# Patient Record
Sex: FEMALE | Race: WHITE | NOT HISPANIC OR LATINO | Employment: UNEMPLOYED | URBAN - METROPOLITAN AREA
[De-identification: names, ages, dates, MRNs, and addresses within clinical notes are randomized per-mention and may not be internally consistent; named-entity substitution may affect disease eponyms.]

---

## 2023-05-31 LAB
ALANINE AMINOTRANSFERASE (SGPT) (U/L) IN SER/PLAS: 24 U/L (ref 7–45)
ALBUMIN (G/DL) IN SER/PLAS: 3.9 G/DL (ref 3.4–5)
ALKALINE PHOSPHATASE (U/L) IN SER/PLAS: 99 U/L (ref 33–136)
ANION GAP IN SER/PLAS: 12 MMOL/L (ref 10–20)
ASPARTATE AMINOTRANSFERASE (SGOT) (U/L) IN SER/PLAS: 30 U/L (ref 9–39)
BILIRUBIN TOTAL (MG/DL) IN SER/PLAS: 0.8 MG/DL (ref 0–1.2)
CALCIUM (MG/DL) IN SER/PLAS: 9.5 MG/DL (ref 8.6–10.3)
CARBON DIOXIDE, TOTAL (MMOL/L) IN SER/PLAS: 30 MMOL/L (ref 21–32)
CHLORIDE (MMOL/L) IN SER/PLAS: 99 MMOL/L (ref 98–107)
CREATININE (MG/DL) IN SER/PLAS: 0.71 MG/DL (ref 0.5–1.05)
GFR FEMALE: >90 ML/MIN/1.73M2
GLUCOSE (MG/DL) IN SER/PLAS: 96 MG/DL (ref 74–99)
POTASSIUM (MMOL/L) IN SER/PLAS: 4.4 MMOL/L (ref 3.5–5.3)
PROTEIN TOTAL: 7.4 G/DL (ref 6.4–8.2)
SODIUM (MMOL/L) IN SER/PLAS: 137 MMOL/L (ref 136–145)
THYROTROPIN (MIU/L) IN SER/PLAS BY DETECTION LIMIT <= 0.05 MIU/L: 3.25 MIU/L (ref 0.44–3.98)
THYROXINE (T4) FREE (NG/DL) IN SER/PLAS: 1.28 NG/DL (ref 0.61–1.12)
TRIIODOTHYRONINE (T3) FREE (PG/ML) IN SER/PLAS: 2.8 PG/ML (ref 2.3–4.2)
UREA NITROGEN (MG/DL) IN SER/PLAS: 16 MG/DL (ref 6–23)

## 2023-06-24 DIAGNOSIS — G89.18 POSTOPERATIVE PAIN: Primary | ICD-10-CM

## 2023-06-24 RX ORDER — OXYCODONE HYDROCHLORIDE 15 MG/1
15 TABLET ORAL EVERY 6 HOURS PRN
Qty: 60 TABLET | Refills: 0 | Status: SHIPPED | OUTPATIENT
Start: 2023-06-24 | End: 2023-07-09

## 2023-07-02 ENCOUNTER — NURSING HOME VISIT (OUTPATIENT)
Dept: POST ACUTE CARE | Facility: EXTERNAL LOCATION | Age: 63
End: 2023-07-02
Payer: MEDICARE

## 2023-07-02 VITALS — DIASTOLIC BLOOD PRESSURE: 67 MMHG | SYSTOLIC BLOOD PRESSURE: 102 MMHG | HEART RATE: 77 BPM

## 2023-07-02 DIAGNOSIS — I73.9 PAD (PERIPHERAL ARTERY DISEASE) (CMS-HCC): Primary | ICD-10-CM

## 2023-07-02 DIAGNOSIS — I25.119 CHEST PAIN DUE TO CAD (CMS-HCC): ICD-10-CM

## 2023-07-02 DIAGNOSIS — Z95.828 S/P VASCULAR BYPASS: ICD-10-CM

## 2023-07-02 DIAGNOSIS — I87.1 SUBCLAVIAN VEIN STENOSIS: ICD-10-CM

## 2023-07-02 DIAGNOSIS — G90.512 COMPLEX REGIONAL PAIN SYNDROME TYPE 1 OF LEFT UPPER EXTREMITY: ICD-10-CM

## 2023-07-02 PROCEDURE — 99306 1ST NF CARE HIGH MDM 50: CPT | Performed by: INTERNAL MEDICINE

## 2023-07-02 ASSESSMENT — ENCOUNTER SYMPTOMS
WEAKNESS: 1
CHEST TIGHTNESS: 0
ACTIVITY CHANGE: 1
COUGH: 0
TREMORS: 0
DIFFICULTY URINATING: 0
GASTROINTESTINAL NEGATIVE: 1
ABDOMINAL DISTENTION: 0
ARTHRALGIAS: 1
WOUND: 1
DYSPHORIC MOOD: 0
FATIGUE: 1
SLEEP DISTURBANCE: 0
VOMITING: 0
SHORTNESS OF BREATH: 1
ABDOMINAL PAIN: 0
NERVOUS/ANXIOUS: 0
APPETITE CHANGE: 0
EYES NEGATIVE: 1
BACK PAIN: 0
WHEEZING: 0

## 2023-07-02 NOTE — LETTER
Patient: Daphney Chen  : 1960    Encounter Date: 2023    Subjective  Patient ID: Daphney Chen is a 63 y.o. female who is acute skilled care and presents for initial visit for skilled nursing.    63-year-old female patient has been admitted after being found to have a subclavian stenosis and aortic disease, patient was having vascular rest pain, after back-and-forth her evaluation they decided to do axillary femoral bypass surgery, surgery was done at [facility], it was axillary bifemoral bypass surgery, patient was admitted for the first time and overnight patient developed chest pain, patient was transferred back to the hospital, slightly hypotensive so everyone jumped into hypotension give vasopressors was admitted to ICU nothing was quite obvious hemoglobin was 10.8 CT scan of chest showed some vascular congestion, echocardiography was done, chest pain subsided, BP readings improved.  After having back-and-forth hospitalization patient is back here in the facility for acute skilled nursing and rehabilitation.  Before revealed occlusive disease in the distal abdominal aorta and bilateral iliac arteries.  Disease was substantial for the inflow of the blood into the lower extremity that axillary bifemoral bypass conduit was placed.  Patient is tired and fatigued, overnight has been uneventful.  Somehow patient has been given steroids, patient has history of compartment syndrome, complex regional pain syndrome, patient has atrial fibrillation, it is not very much clear to me but patient is on Eliquis and clopidogrel so there must be reason to give both.  Vascular disease also is one of the indication to give both.  So now patient will require skilled nursing and rehabilitation here at the facility for a duration of time.  All the hospitalization data and information were reviewed.         Review of Systems   Constitutional:  Positive for activity change and fatigue. Negative for appetite change.    HENT: Negative.  Negative for congestion and drooling.    Eyes: Negative.  Negative for visual disturbance.   Respiratory:  Positive for shortness of breath. Negative for cough, chest tightness and wheezing.    Cardiovascular:  Positive for leg swelling.   Gastrointestinal: Negative.  Negative for abdominal distention, abdominal pain and vomiting.   Genitourinary:  Negative for difficulty urinating.   Musculoskeletal:  Positive for arthralgias and gait problem. Negative for back pain.   Skin:  Positive for wound.   Neurological:  Positive for weakness. Negative for tremors.   Psychiatric/Behavioral:  Negative for dysphoric mood and sleep disturbance. The patient is not nervous/anxious.        Objective  /67   Pulse 77     Physical Exam  Constitutional:       Appearance: Normal appearance. She is obese.      Comments: Looks tired   HENT:      Head: Normocephalic.      Nose: Nose normal.   Eyes:      Conjunctiva/sclera: Conjunctivae normal.   Cardiovascular:      Rate and Rhythm: Normal rate and regular rhythm.      Heart sounds: Normal heart sounds.   Pulmonary:      Effort: Pulmonary effort is normal.      Breath sounds: Normal breath sounds.   Abdominal:      Palpations: Abdomen is soft.   Musculoskeletal:         General: Normal range of motion.      Cervical back: Normal range of motion and neck supple.   Skin:     General: Skin is warm and dry.      Findings: Wound present.   Neurological:      General: No focal deficit present.      Mental Status: She is oriented to person, place, and time. Mental status is at baseline.   Psychiatric:         Mood and Affect: Mood is depressed.         Judgment: Judgment normal.         Assessment/Plan  Problem List Items Addressed This Visit       Complex regional pain syndrome type 1 of left upper extremity     Other Visit Diagnoses       PAD (peripheral artery disease) (CMS/[facility])    -  Primary    Subclavian vein stenosis        S/P vascular bypass        Chest pain  due to CAD (CMS/[facility])            Patient is listed to be on albuterol, Eliquis, atorvastatin, clopidogrel, gabapentin 800 mg 3 times a day, Lasix, levothyroxine, Incruse, metoprolol 25 mg once a day, oxycodone, prednisone in a tapering regimen.  It is not very much clear to me why prednisone is given but we will continue and taper off.  Physical therapy, Occupational Therapy evaluation will be done sure and definitely tomorrow.  She is a heavyset female patient, she does not have any chest pains, the wound on the chest and the wound on the lower abdomen seems to be healing well.  It was a complicated vascular bypass surgery.  She is tired and fatigued, she remains quite dysfunctional.  Serial sessions of skilled nursing and rehabilitation are warranted.  Medications are reviewed, discussed with nursing staff, laboratories will be done as per our routine.  Patient is expected to be here in the facility for short duration of time.  No major concerns unless some new development happens or new complications arise.  Expect patient to be anemic, any new symptoms will be attended quickly by nursing staff and physician will be notified.  We hope that patient will do well here for duration of time for skilled nursing and rehabilitation, periodic follow-up and assessment will be done.     Goals    None           Electronically Signed By: Chito Patel MD   7/2/23  9:31 PM

## 2023-07-03 NOTE — PROGRESS NOTES
Subjective   Patient ID: Daphney Chen is a 63 y.o. female who is acute skilled care and presents for initial visit for skilled nursing.    63-year-old female patient has been admitted after being found to have a subclavian stenosis and aortic disease, patient was having vascular rest pain, after back-and-forth her evaluation they decided to do axillary femoral bypass surgery, surgery was done at [facility], it was axillary bifemoral bypass surgery, patient was admitted for the first time and overnight patient developed chest pain, patient was transferred back to the hospital, slightly hypotensive so everyone jumped into hypotension give vasopressors was admitted to ICU nothing was quite obvious hemoglobin was 10.8 CT scan of chest showed some vascular congestion, echocardiography was done, chest pain subsided, BP readings improved.  After having back-and-forth hospitalization patient is back here in the facility for acute skilled nursing and rehabilitation.  Before revealed occlusive disease in the distal abdominal aorta and bilateral iliac arteries.  Disease was substantial for the inflow of the blood into the lower extremity that axillary bifemoral bypass conduit was placed.  Patient is tired and fatigued, overnight has been uneventful.  Somehow patient has been given steroids, patient has history of compartment syndrome, complex regional pain syndrome, patient has atrial fibrillation, it is not very much clear to me but patient is on Eliquis and clopidogrel so there must be reason to give both.  Vascular disease also is one of the indication to give both.  So now patient will require skilled nursing and rehabilitation here at the facility for a duration of time.  All the hospitalization data and information were reviewed.         Review of Systems   Constitutional:  Positive for activity change and fatigue. Negative for appetite change.   HENT: Negative.  Negative for congestion and drooling.    Eyes:  Negative.  Negative for visual disturbance.   Respiratory:  Positive for shortness of breath. Negative for cough, chest tightness and wheezing.    Cardiovascular:  Positive for leg swelling.   Gastrointestinal: Negative.  Negative for abdominal distention, abdominal pain and vomiting.   Genitourinary:  Negative for difficulty urinating.   Musculoskeletal:  Positive for arthralgias and gait problem. Negative for back pain.   Skin:  Positive for wound.   Neurological:  Positive for weakness. Negative for tremors.   Psychiatric/Behavioral:  Negative for dysphoric mood and sleep disturbance. The patient is not nervous/anxious.        Objective   /67   Pulse 77     Physical Exam  Constitutional:       Appearance: Normal appearance. She is obese.      Comments: Looks tired   HENT:      Head: Normocephalic.      Nose: Nose normal.   Eyes:      Conjunctiva/sclera: Conjunctivae normal.   Cardiovascular:      Rate and Rhythm: Normal rate and regular rhythm.      Heart sounds: Normal heart sounds.   Pulmonary:      Effort: Pulmonary effort is normal.      Breath sounds: Normal breath sounds.   Abdominal:      Palpations: Abdomen is soft.   Musculoskeletal:         General: Normal range of motion.      Cervical back: Normal range of motion and neck supple.   Skin:     General: Skin is warm and dry.      Findings: Wound present.   Neurological:      General: No focal deficit present.      Mental Status: She is oriented to person, place, and time. Mental status is at baseline.   Psychiatric:         Mood and Affect: Mood is depressed.         Judgment: Judgment normal.         Assessment/Plan   Problem List Items Addressed This Visit       Complex regional pain syndrome type 1 of left upper extremity     Other Visit Diagnoses       PAD (peripheral artery disease) (CMS/[facility])    -  Primary    Subclavian vein stenosis        S/P vascular bypass        Chest pain due to CAD (CMS/[facility])            Patient is listed to be on  albuterol, Eliquis, atorvastatin, clopidogrel, gabapentin 800 mg 3 times a day, Lasix, levothyroxine, Incruse, metoprolol 25 mg once a day, oxycodone, prednisone in a tapering regimen.  It is not very much clear to me why prednisone is given but we will continue and taper off.  Physical therapy, Occupational Therapy evaluation will be done sure and definitely tomorrow.  She is a heavyset female patient, she does not have any chest pains, the wound on the chest and the wound on the lower abdomen seems to be healing well.  It was a complicated vascular bypass surgery.  She is tired and fatigued, she remains quite dysfunctional.  Serial sessions of skilled nursing and rehabilitation are warranted.  Medications are reviewed, discussed with nursing staff, laboratories will be done as per our routine.  Patient is expected to be here in the facility for short duration of time.  No major concerns unless some new development happens or new complications arise.  Expect patient to be anemic, any new symptoms will be attended quickly by nursing staff and physician will be notified.  We hope that patient will do well here for duration of time for skilled nursing and rehabilitation, periodic follow-up and assessment will be done.     Goals    None

## 2023-07-05 ENCOUNTER — NURSING HOME VISIT (OUTPATIENT)
Dept: POST ACUTE CARE | Facility: EXTERNAL LOCATION | Age: 63
End: 2023-07-05
Payer: MEDICARE

## 2023-07-05 DIAGNOSIS — I87.1 SUBCLAVIAN VEIN STENOSIS: ICD-10-CM

## 2023-07-05 DIAGNOSIS — G90.512 COMPLEX REGIONAL PAIN SYNDROME TYPE 1 OF LEFT UPPER EXTREMITY: ICD-10-CM

## 2023-07-05 DIAGNOSIS — N93.9 VAGINA BLEEDING: ICD-10-CM

## 2023-07-05 DIAGNOSIS — Z95.828 S/P VASCULAR BYPASS: ICD-10-CM

## 2023-07-05 DIAGNOSIS — I73.9 PAD (PERIPHERAL ARTERY DISEASE) (CMS-HCC): Primary | ICD-10-CM

## 2023-07-05 PROCEDURE — 99308 SBSQ NF CARE LOW MDM 20: CPT | Performed by: INTERNAL MEDICINE

## 2023-07-05 NOTE — LETTER
Patient: Daphney Chen  : 1960    Encounter Date: 2023    Subjective  Patient ID: Daphney Chen is a 63 y.o. female who is acute skilled care being seen and evaluated for multiple medical problems.    Patient's blood sugars has been rising, she is on prednisone although it has been tapered off.  Patient's metformin has been started and it will be 1000 mg twice a day.  Patient has bleeding from vagina, yesterday I have a message that patient has a bleeding per rectum but specifically it is coming from the introitus.  Then they said that she has been having some blood in the sputum so I reviewed the CT scan and there was interstitial edema was seen pleural effusions were seen and there was bulla with the fluid consistent with bullitis.  Patient has a axillary bifemoral bypass surgery, wound is healing well, patient remains tired and fatigued, patient remains on nasal oxygen, patient's laboratories has been showing consistent anemia.  Hemoglobin was 10.2.  Still quite weak and tired and required 1-2 person assist, appetite remains poor, did not have any further fever chills or any chest pain.         Review of Systems   Constitutional:  Positive for activity change, appetite change and fatigue.   HENT:  Negative for congestion and nosebleeds.    Eyes:  Negative for visual disturbance.   Respiratory:  Positive for cough and shortness of breath. Negative for wheezing.    Cardiovascular:  Negative for chest pain and palpitations.   Gastrointestinal:  Negative for abdominal distention, blood in stool, nausea and vomiting.   Genitourinary:  Positive for vaginal bleeding.   Musculoskeletal:  Positive for arthralgias and back pain. Negative for joint swelling.   Skin:  Positive for wound.   Neurological:  Negative for dizziness.   Hematological:  Does not bruise/bleed easily.   Psychiatric/Behavioral:  The patient is nervous/anxious.        Objective  /66   Pulse 82   Wt 105 kg (232 lb)   BMI 36.34  kg/m²     Physical Exam  Constitutional:       Appearance: Normal appearance. She is obese.      Comments: Looks tired   HENT:      Head: Normocephalic.      Nose: Nose normal.   Eyes:      Conjunctiva/sclera: Conjunctivae normal.   Cardiovascular:      Rate and Rhythm: Normal rate and regular rhythm.      Heart sounds: Normal heart sounds.   Pulmonary:      Effort: Pulmonary effort is normal.      Breath sounds: Normal breath sounds.   Abdominal:      Palpations: Abdomen is soft.   Musculoskeletal:         General: Normal range of motion.      Cervical back: Normal range of motion and neck supple.   Skin:     General: Skin is warm and dry.      Findings: Wound present.   Neurological:      General: No focal deficit present.      Mental Status: She is oriented to person, place, and time. Mental status is at baseline.   Assessment/Plan  Problem List Items Addressed This Visit       Complex regional pain syndrome type 1 of left upper extremity     Other Visit Diagnoses       PAD (peripheral artery disease) (CMS/[facility])    -  Primary    Subclavian vein stenosis        S/P vascular bypass        Vagina bleeding            Patient is heavyset, looks tired and fatigued, just has been here in the facility for 6 days, required a lot of care and assist, as the prednisone has been tapered off her blood sugar should come down, it is a glucocorticoid induced hyperglycemia with metformin therapy to be maintained.  Ultrasound of pelvis and transvaginal ultrasound can be done, patient is on clopidogrel and apixaban so reason for bleeding could be double anticoagulation therapy, hemoptysis also is concerning but CT did not show any other pathological findings which can explain hemoptysis, it will take some time and effort for this patient to recover, patient is to be motivated, wounds are healing well, physical therapy progress was reviewed, pain control is reasonable, will follow the ultrasound report.  Nursing staff did not have any  questions or concerns.     Goals    None           Electronically Signed By: Chito Patel MD   7/6/23  6:23 PM

## 2023-07-06 VITALS
HEART RATE: 82 BPM | BODY MASS INDEX: 36.34 KG/M2 | DIASTOLIC BLOOD PRESSURE: 66 MMHG | WEIGHT: 232 LBS | SYSTOLIC BLOOD PRESSURE: 105 MMHG

## 2023-07-06 ASSESSMENT — ENCOUNTER SYMPTOMS
APPETITE CHANGE: 1
PALPITATIONS: 0
ARTHRALGIAS: 1
BLOOD IN STOOL: 0
WHEEZING: 0
BRUISES/BLEEDS EASILY: 0
VOMITING: 0
NAUSEA: 0
COUGH: 1
ACTIVITY CHANGE: 1
SHORTNESS OF BREATH: 1
DIZZINESS: 0
WOUND: 1
BACK PAIN: 1
FATIGUE: 1
NERVOUS/ANXIOUS: 1
ABDOMINAL DISTENTION: 0
JOINT SWELLING: 0

## 2023-07-06 NOTE — PROGRESS NOTES
Subjective   Patient ID: Daphney Chen is a 63 y.o. female who is acute skilled care being seen and evaluated for multiple medical problems.    Patient's blood sugars has been rising, she is on prednisone although it has been tapered off.  Patient's metformin has been started and it will be 1000 mg twice a day.  Patient has bleeding from vagina, yesterday I have a message that patient has a bleeding per rectum but specifically it is coming from the introitus.  Then they said that she has been having some blood in the sputum so I reviewed the CT scan and there was interstitial edema was seen pleural effusions were seen and there was bulla with the fluid consistent with bullitis.  Patient has a axillary bifemoral bypass surgery, wound is healing well, patient remains tired and fatigued, patient remains on nasal oxygen, patient's laboratories has been showing consistent anemia.  Hemoglobin was 10.2.  Still quite weak and tired and required 1-2 person assist, appetite remains poor, did not have any further fever chills or any chest pain.         Review of Systems   Constitutional:  Positive for activity change, appetite change and fatigue.   HENT:  Negative for congestion and nosebleeds.    Eyes:  Negative for visual disturbance.   Respiratory:  Positive for cough and shortness of breath. Negative for wheezing.    Cardiovascular:  Negative for chest pain and palpitations.   Gastrointestinal:  Negative for abdominal distention, blood in stool, nausea and vomiting.   Genitourinary:  Positive for vaginal bleeding.   Musculoskeletal:  Positive for arthralgias and back pain. Negative for joint swelling.   Skin:  Positive for wound.   Neurological:  Negative for dizziness.   Hematological:  Does not bruise/bleed easily.   Psychiatric/Behavioral:  The patient is nervous/anxious.        Objective   /66   Pulse 82   Wt 105 kg (232 lb)   BMI 36.34 kg/m²     Physical Exam  Constitutional:       Appearance: Normal  appearance. She is obese.      Comments: Looks tired   HENT:      Head: Normocephalic.      Nose: Nose normal.   Eyes:      Conjunctiva/sclera: Conjunctivae normal.   Cardiovascular:      Rate and Rhythm: Normal rate and regular rhythm.      Heart sounds: Normal heart sounds.   Pulmonary:      Effort: Pulmonary effort is normal.      Breath sounds: Normal breath sounds.   Abdominal:      Palpations: Abdomen is soft.   Musculoskeletal:         General: Normal range of motion.      Cervical back: Normal range of motion and neck supple.   Skin:     General: Skin is warm and dry.      Findings: Wound present.   Neurological:      General: No focal deficit present.      Mental Status: She is oriented to person, place, and time. Mental status is at baseline.   Assessment/Plan   Problem List Items Addressed This Visit       Complex regional pain syndrome type 1 of left upper extremity     Other Visit Diagnoses       PAD (peripheral artery disease) (CMS/[facility])    -  Primary    Subclavian vein stenosis        S/P vascular bypass        Vagina bleeding            Patient is heavyset, looks tired and fatigued, just has been here in the facility for 6 days, required a lot of care and assist, as the prednisone has been tapered off her blood sugar should come down, it is a glucocorticoid induced hyperglycemia with metformin therapy to be maintained.  Ultrasound of pelvis and transvaginal ultrasound can be done, patient is on clopidogrel and apixaban so reason for bleeding could be double anticoagulation therapy, hemoptysis also is concerning but CT did not show any other pathological findings which can explain hemoptysis, it will take some time and effort for this patient to recover, patient is to be motivated, wounds are healing well, physical therapy progress was reviewed, pain control is reasonable, will follow the ultrasound report.  Nursing staff did not have any questions or concerns.     Goals    None

## 2023-07-07 ENCOUNTER — NURSING HOME VISIT (OUTPATIENT)
Dept: POST ACUTE CARE | Facility: EXTERNAL LOCATION | Age: 63
End: 2023-07-07
Payer: MEDICARE

## 2023-07-07 VITALS
HEIGHT: 68 IN | RESPIRATION RATE: 18 BRPM | WEIGHT: 232 LBS | DIASTOLIC BLOOD PRESSURE: 67 MMHG | SYSTOLIC BLOOD PRESSURE: 98 MMHG | HEART RATE: 86 BPM | TEMPERATURE: 97.5 F | BODY MASS INDEX: 35.16 KG/M2 | OXYGEN SATURATION: 95 %

## 2023-07-07 DIAGNOSIS — I10 PRIMARY HYPERTENSION: ICD-10-CM

## 2023-07-07 DIAGNOSIS — Z74.09 IMPAIRED FUNCTIONAL MOBILITY, BALANCE, GAIT, AND ENDURANCE: ICD-10-CM

## 2023-07-07 DIAGNOSIS — I51.89 COMBINED SYSTOLIC AND DIASTOLIC CARDIAC DYSFUNCTION: ICD-10-CM

## 2023-07-07 DIAGNOSIS — I73.9 PVD (PERIPHERAL VASCULAR DISEASE) (CMS-HCC): Primary | ICD-10-CM

## 2023-07-07 DIAGNOSIS — N93.9 VAGINAL BLEEDING: ICD-10-CM

## 2023-07-07 PROCEDURE — 99310 SBSQ NF CARE HIGH MDM 45: CPT | Performed by: PHYSICIAN ASSISTANT

## 2023-07-07 NOTE — LETTER
"Patient: Daphney Chen  : 1960    Encounter Date: 2023  Name: Daphney Chen  YOB: 1960    Chief complaint: Peripheral vascular disease with axillo-bifemoral bypass.    HPI: This is a 63 year old  female who has a medical history significant for COPD, hyperlipidemia, gerd, diabetes, PVD, thrombosed infrarenal aorta with axillo-bifemoral bypass, obesity, MDD, systolic and diastolic heart failure, anemia, and hypothyroidism. Patient was initially admitted to SNF on 2023 following axillo-bifemoral bypass for thrombosed infrarenal aorta and severe bilateral rest pain. Her post operative course was significant for hypotension requiring vasopressor support. She return to the hospital the next day with complaints of shortness of breath and chest pressure. Patient's BNP is 659 and she was hypotensive. Troponin was 108,126.  She was place on norepinephrine and admitted to ICU. Echocardiogram show ejection fraction of 60 -65%. Chest x-ray was negative for acute pathology. She was treated for fluid overload with Lasix and improved. Patient was returned to [facility] for rehab.     Seen and examined in her room. Denies fever, sob, chest pain or nausea. Has had complaint of intermittent vaginal bleeding. No bleeding reported today.    Review of systems:   ROS negative except were noted in HPI.    Code Status: full code    BP 98/67   Pulse 86   Temp 36.4 °C (97.5 °F)   Resp 18   Ht 1.727 m (5' 8\")   Wt 105 kg (232 lb)   SpO2 95%   BMI 35.28 kg/m²      Physical Exam  Constitutional:       General: She is not in acute distress.     Appearance: She is obese.   HENT:      Head: Normocephalic.      Nose: Nose normal. No congestion.      Mouth/Throat:      Mouth: Mucous membranes are moist.   Eyes:      Extraocular Movements: Extraocular movements intact.      Pupils: Pupils are equal, round, and reactive to light.   Cardiovascular:      Rate and Rhythm: Normal rate and " regular rhythm.      Pulses: Normal pulses.      Heart sounds: Normal heart sounds.   Pulmonary:      Effort: Pulmonary effort is normal.      Breath sounds: Normal breath sounds. No wheezing or rales.   Abdominal:      General: Bowel sounds are normal.      Tenderness: There is no abdominal tenderness. There is no guarding.   Genitourinary:     Comments: Voiding.  Musculoskeletal:         General: Normal range of motion.      Cervical back: Normal range of motion. No rigidity.   Lymphadenopathy:      Cervical: No cervical adenopathy.   Skin:     General: Skin is warm and dry.      Capillary Refill: Capillary refill takes less than 2 seconds.   Neurological:      General: No focal deficit present.      Mental Status: She is alert and oriented to person, place, and time.   Psychiatric:         Mood and Affect: Mood normal.         Behavior: Behavior normal.        Medications reviewed during visit at facility.  Tylenol 650 mg po q 4 hour prn  MOM 30 ml po daily prn constipation   Levothyroxine 100 mcg po daily  Naloxone 4 mg one spray both nostrils daily  Plavix 75 mg po daily  Apixaban 5 mg po bid  Oxycodone 5 mg po q 4 hours prn  Senna 8.6/ 50 mg po daily  Lasix 20 mg po daily  Albuterol 108 mcg two puffs q 4 hours prn  Atorvastatin 80 mg po daily   Gabapentin 800 mg po tid  Incruse 62.5 one puff daily  Nitroglycerin 0.4 mg sl q 5 minutes x 3 dose chest pain  Metoprolol succinate 25 mg 1/2 tablet po daily  Metformin 1000 mg po bid  Humalog sliding scale insulin coverage  Labs reviewed at facility:   Laboratory Service Report [id number]   Patient Name   IVETTE GUILLAUME  Patient ID   [id number]  Age   63 Y  Gender   F  Order #      Ordering HENRI Hatfield  Patient Telephone #   [phone]     1960  IVETTE ELLIS   Client Order #   [id number]   Collection Date and Time   2023 05:35   Print Date and Time   2023 16:27  Account Information   [facility] NR   [id number]  [place] [address]     Report Notes                  Test Results   Reference Perform  Unit  Value Site*             CBC and Differential  REPORTED 07/05/2023 10:17  White Blood Cell Count   7.29 k/uL 3.70-11.00    RBC L 3.36 m/uL 3.90-5.20    Hemoglobin L 10.5 g/dL 11.5-15.5    Hematocrit L 35.4 % 36.0-46.0    MCV H 105.4 fL 80.0-100.0    MCH   31.3 pg 26.0-34.0    MCHC L 29.7 g/dL 30.5-36.0    RDW-CV H 15.9 % 11.5-15.0    Platelet Count   259 k/uL 150-400    MPV   10.2 fL 9.0-12.7    Neut%   51.5 %    Abs Neut   3.75 k/uL 1.45-7.50    Lymph%   26.5 %    Abs Lymph   1.93 k/uL 1.00-4.00    Mono%   16.2 %    Abs Mono H 1.18 k/uL <0.87    Eosin%   3.8 %    Abs Eosin   0.28 k/uL <0.46    Baso%   1.5 %    Abs Baso H 0.11 k/uL <0.11    Immature Gran %%   0.5 %    Abs Immature Gran   0.04 k/uL <0.10    Absolute nRBC   0.0 /100 WBC    Absolute nRBC   <0.01 k/uL <0.01    Diff Type   Auto               Comp Metabolic Panel  REPORTED 07/05/2023 10:27  Protein, Total   7.0 g/dL 6.3-8.0    Albumin   4.0 g/dL 3.9-4.9    Calcium, Total   9.2 mg/dL 8.5-10.2    Bilirubin, Total   0.5 mg/dL 0.2-1.3    Alkaline Phosphatase H 145 U/L     AST H 49 U/L 13-35    ALT H 53 U/L 7-38    Glucose H 213 mg/dL 74-99  BUN   18 mg/dL 7-21    Creatinine   0.84 mg/dL 0.58-0.96    Sodium   138 mmol/L 136-144    Potassium   5.0 mmol/L 3.7-5.1    Chloride   99 mmol/L     CO2   27 mmol/L 22-30    Anion Gap   12 mmol/L 9-18    Estimated Glomerular Filtration Rate   77 mL/min/1.73 meters squared >=60    Assessment/Plan   Problem List Items Addressed This Visit       PVD (peripheral vascular disease) (CMS/HCC) - Primary     S/P Axillo-bifemoral bypass. Schedule follow up with LIANA Roland 7/24/23 at 3:40 pm [facility]. Continue with Apixaban 5 mg po bid. Plavix 75 mg po daily         Primary hypertension     Metoprolol succinate 25 mg 1/2 tablet po daily. Review BP readings.         Vaginal bleeding     Transabdominal  and endovaginal ultrasound study showing no discrete fibroids.  R ovary not visualized. L ovary reported as normal.  Further study with CT scan scheduled for 7/10/2023.         Combined systolic and diastolic cardiac dysfunction     Lasix 20 mg po daily. Echocardiogram showing ejection fraction 60 - 65%. Schedule follow up appointment with  Dr. Wellington 7/12/23. Due to diagnosis of COPD and CHF, patient will need oxygen for discharge due to pulse oximetry dropping with exertion.         Impaired functional mobility, balance, gait, and endurance     PT and OT to assess and treat.            Time:  I spent 45 minutes or greater with the patient. Greater than 50% of this time was spent in counseling and or coordination of care. The time includes prep time of reviewing vital signs, report from direct nursing staff and or therapists, hospital documentation, reviewing labs, radiographs, diagnostic tests and or consultations, time directly spent with the patient interviewing, examining, and education regarding diagnosis, treatments, and medications, as well as documentation in the electronic medical record, and reviewing the plan of care and any new orders with the patient, nursing staff and other staff directly related to the patients care.      Jose Portillo PA-C       Electronically Signed By: Jose Portillo PA-C   7/9/23  4:55 PM

## 2023-07-07 NOTE — PROGRESS NOTES
"7/7/2023  Name: Daphney Chen  YOB: 1960    Chief complaint: Peripheral vascular disease with axillo-bifemoral bypass.    HPI: This is a 63 year old  female who has a medical history significant for COPD, hyperlipidemia, gerd, diabetes, PVD, thrombosed infrarenal aorta with axillo-bifemoral bypass, obesity, MDD, systolic and diastolic heart failure, anemia, and hypothyroidism. Patient was initially admitted to SNF on 6/23/2023 following axillo-bifemoral bypass for thrombosed infrarenal aorta and severe bilateral rest pain. Her post operative course was significant for hypotension requiring vasopressor support. She return to the hospital the next day with complaints of shortness of breath and chest pressure. Patient's BNP is 659 and she was hypotensive. Troponin was 108,126.  She was place on norepinephrine and admitted to ICU. Echocardiogram show ejection fraction of 60 -65%. Chest x-ray was negative for acute pathology. She was treated for fluid overload with Lasix and improved. Patient was returned to [facility] for rehab.     Seen and examined in her room. Denies fever, sob, chest pain or nausea. Has had complaint of intermittent vaginal bleeding. No bleeding reported today.    Review of systems:   ROS negative except were noted in HPI.    Code Status: full code    BP 98/67   Pulse 86   Temp 36.4 °C (97.5 °F)   Resp 18   Ht 1.727 m (5' 8\")   Wt 105 kg (232 lb)   SpO2 95%   BMI 35.28 kg/m²      Physical Exam  Constitutional:       General: She is not in acute distress.     Appearance: She is obese.   HENT:      Head: Normocephalic.      Nose: Nose normal. No congestion.      Mouth/Throat:      Mouth: Mucous membranes are moist.   Eyes:      Extraocular Movements: Extraocular movements intact.      Pupils: Pupils are equal, round, and reactive to light.   Cardiovascular:      Rate and Rhythm: Normal rate and regular rhythm.      Pulses: Normal pulses.      Heart sounds: Normal " heart sounds.   Pulmonary:      Effort: Pulmonary effort is normal.      Breath sounds: Normal breath sounds. No wheezing or rales.   Abdominal:      General: Bowel sounds are normal.      Tenderness: There is no abdominal tenderness. There is no guarding.   Genitourinary:     Comments: Voiding.  Musculoskeletal:         General: Normal range of motion.      Cervical back: Normal range of motion. No rigidity.   Lymphadenopathy:      Cervical: No cervical adenopathy.   Skin:     General: Skin is warm and dry.      Capillary Refill: Capillary refill takes less than 2 seconds.   Neurological:      General: No focal deficit present.      Mental Status: She is alert and oriented to person, place, and time.   Psychiatric:         Mood and Affect: Mood normal.         Behavior: Behavior normal.        Medications reviewed during visit at facility.  Tylenol 650 mg po q 4 hour prn  MOM 30 ml po daily prn constipation   Levothyroxine 100 mcg po daily  Naloxone 4 mg one spray both nostrils daily  Plavix 75 mg po daily  Apixaban 5 mg po bid  Oxycodone 5 mg po q 4 hours prn  Senna 8.6/ 50 mg po daily  Lasix 20 mg po daily  Albuterol 108 mcg two puffs q 4 hours prn  Atorvastatin 80 mg po daily   Gabapentin 800 mg po tid  Incruse 62.5 one puff daily  Nitroglycerin 0.4 mg sl q 5 minutes x 3 dose chest pain  Metoprolol succinate 25 mg 1/2 tablet po daily  Metformin 1000 mg po bid  Humalog sliding scale insulin coverage  Labs reviewed at facility:   Laboratory Service Report [id number]   Patient Name   IVETTE GUILLAUME  Patient ID   [id number]  Age   63 Y  Gender   F  Order #      Ordering HENRI Hatfield  Patient Telephone #   [phone]     1960  AKTAYLOR   SHASHI GUILLAUMEIE   Client Order #   [id number]   Collection Date and Time   2023 05:35   Print Date and Time   2023 16:27  Account Information   [facility] NR   [id number] [place] [address]     Report Notes                   Test Results   Reference Perform  Unit  Value Site*             CBC and Differential  REPORTED 07/05/2023 10:17  White Blood Cell Count   7.29 k/uL 3.70-11.00    RBC L 3.36 m/uL 3.90-5.20    Hemoglobin L 10.5 g/dL 11.5-15.5    Hematocrit L 35.4 % 36.0-46.0    MCV H 105.4 fL 80.0-100.0    MCH   31.3 pg 26.0-34.0    MCHC L 29.7 g/dL 30.5-36.0    RDW-CV H 15.9 % 11.5-15.0    Platelet Count   259 k/uL 150-400    MPV   10.2 fL 9.0-12.7    Neut%   51.5 %    Abs Neut   3.75 k/uL 1.45-7.50    Lymph%   26.5 %    Abs Lymph   1.93 k/uL 1.00-4.00    Mono%   16.2 %    Abs Mono H 1.18 k/uL <0.87    Eosin%   3.8 %    Abs Eosin   0.28 k/uL <0.46    Baso%   1.5 %    Abs Baso H 0.11 k/uL <0.11    Immature Gran %%   0.5 %    Abs Immature Gran   0.04 k/uL <0.10    Absolute nRBC   0.0 /100 WBC    Absolute nRBC   <0.01 k/uL <0.01    Diff Type   Auto               Comp Metabolic Panel  REPORTED 07/05/2023 10:27  Protein, Total   7.0 g/dL 6.3-8.0    Albumin   4.0 g/dL 3.9-4.9    Calcium, Total   9.2 mg/dL 8.5-10.2    Bilirubin, Total   0.5 mg/dL 0.2-1.3    Alkaline Phosphatase H 145 U/L     AST H 49 U/L 13-35    ALT H 53 U/L 7-38    Glucose H 213 mg/dL 74-99  BUN   18 mg/dL 7-21    Creatinine   0.84 mg/dL 0.58-0.96    Sodium   138 mmol/L 136-144    Potassium   5.0 mmol/L 3.7-5.1    Chloride   99 mmol/L     CO2   27 mmol/L 22-30    Anion Gap   12 mmol/L 9-18    Estimated Glomerular Filtration Rate   77 mL/min/1.73 meters squared >=60    Assessment/Plan    Problem List Items Addressed This Visit       PVD (peripheral vascular disease) (CMS/HCC) - Primary     S/P Axillo-bifemoral bypass. Schedule follow up with LIANA Roland 7/24/23 at 3:40 pm [facility]. Continue with Apixaban 5 mg po bid. Plavix 75 mg po daily         Primary hypertension     Metoprolol succinate 25 mg 1/2 tablet po daily. Review BP readings.         Vaginal bleeding     Transabdominal and endovaginal ultrasound study showing no discrete fibroids.  R  ovary not visualized. L ovary reported as normal.  Further study with CT scan scheduled for 7/10/2023.         Combined systolic and diastolic cardiac dysfunction     Lasix 20 mg po daily. Echocardiogram showing ejection fraction 60 - 65%. Schedule follow up appointment with  Dr. Wellington 7/12/23. Due to diagnosis of COPD and CHF, patient will need oxygen for discharge due to pulse oximetry dropping with exertion.         Impaired functional mobility, balance, gait, and endurance     PT and OT to assess and treat.            Time:  I spent 45 minutes or greater with the patient. Greater than 50% of this time was spent in counseling and or coordination of care. The time includes prep time of reviewing vital signs, report from direct nursing staff and or therapists, hospital documentation, reviewing labs, radiographs, diagnostic tests and or consultations, time directly spent with the patient interviewing, examining, and education regarding diagnosis, treatments, and medications, as well as documentation in the electronic medical record, and reviewing the plan of care and any new orders with the patient, nursing staff and other staff directly related to the patients care.      Jose Portillo PA-C

## 2023-07-09 PROBLEM — I73.9 PVD (PERIPHERAL VASCULAR DISEASE) (CMS-HCC): Status: ACTIVE | Noted: 2023-07-09

## 2023-07-09 PROBLEM — E11.9 TYPE 2 DIABETES MELLITUS, WITH LONG-TERM CURRENT USE OF INSULIN (MULTI): Status: ACTIVE | Noted: 2023-07-09

## 2023-07-09 PROBLEM — Z79.4 TYPE 2 DIABETES MELLITUS, WITH LONG-TERM CURRENT USE OF INSULIN (MULTI): Status: ACTIVE | Noted: 2023-07-09

## 2023-07-09 PROBLEM — I10 PRIMARY HYPERTENSION: Status: ACTIVE | Noted: 2023-07-09

## 2023-07-09 PROBLEM — N93.9 VAGINAL BLEEDING: Status: ACTIVE | Noted: 2023-07-09

## 2023-07-09 PROBLEM — I51.89 COMBINED SYSTOLIC AND DIASTOLIC CARDIAC DYSFUNCTION: Status: ACTIVE | Noted: 2023-07-09

## 2023-07-09 PROBLEM — Z74.09 IMPAIRED FUNCTIONAL MOBILITY, BALANCE, GAIT, AND ENDURANCE: Status: ACTIVE | Noted: 2023-07-09

## 2023-07-09 NOTE — ASSESSMENT & PLAN NOTE
Transabdominal and endovaginal ultrasound study showing no discrete fibroids.  R ovary not visualized. L ovary reported as normal.  Further study with CT scan scheduled for 7/10/2023.

## 2023-07-09 NOTE — ASSESSMENT & PLAN NOTE
Lasix 20 mg po daily. Echocardiogram showing ejection fraction 60 - 65%. Schedule follow up appointment with  Dr. Wellington 7/12/23. Due to diagnosis of COPD and CHF, patient will need oxygen for discharge due to pulse oximetry dropping with exertion.

## 2023-07-09 NOTE — ASSESSMENT & PLAN NOTE
S/P Axillo-bifemoral bypass. Schedule follow up with LIANA Roland 7/24/23 at 3:40 pm [facility]. Continue with Apixaban 5 mg po bid. Plavix 75 mg po daily

## 2023-07-12 ENCOUNTER — NURSING HOME VISIT (OUTPATIENT)
Dept: POST ACUTE CARE | Facility: EXTERNAL LOCATION | Age: 63
End: 2023-07-12
Payer: MEDICARE

## 2023-07-12 DIAGNOSIS — R04.2 HEMOPTYSIS: ICD-10-CM

## 2023-07-12 DIAGNOSIS — I73.9 PVD (PERIPHERAL VASCULAR DISEASE) (CMS-HCC): Primary | ICD-10-CM

## 2023-07-12 DIAGNOSIS — I87.1 SUBCLAVIAN VEIN STENOSIS: ICD-10-CM

## 2023-07-12 DIAGNOSIS — I51.89 COMBINED SYSTOLIC AND DIASTOLIC CARDIAC DYSFUNCTION: ICD-10-CM

## 2023-07-12 DIAGNOSIS — Z95.828 S/P VASCULAR BYPASS: ICD-10-CM

## 2023-07-12 DIAGNOSIS — G90.512 COMPLEX REGIONAL PAIN SYNDROME TYPE 1 OF LEFT UPPER EXTREMITY: ICD-10-CM

## 2023-07-12 DIAGNOSIS — I10 PRIMARY HYPERTENSION: ICD-10-CM

## 2023-07-12 PROCEDURE — 99308 SBSQ NF CARE LOW MDM 20: CPT | Performed by: INTERNAL MEDICINE

## 2023-07-12 NOTE — LETTER
Patient: Daphney Chen  : 1960    Encounter Date: 2023    Subjective  Patient ID: Daphney Chen is a 63 y.o. female who is acute skilled care being seen and evaluated for multiple medical problems.    Patient was seen by me again because after I saw this patient last time I have a chance to talk with a vascular physician and it was decided that the patient has a axillary femoral bypass surgery so some sort of anticoagulation is required so we have changed to Xarelto 2.5 mg twice a day and patient has a coronary PCI done that means patient will require clopidogrel.  As I am seeing this patient patient is going to follow-up with cardiology today, patient still feels weak still weak and tired, still patient has some nosebleeds and also little bit of blood coming with the cough, the ultrasound revealed a slight prominence of endometrial stripe, they could not say exactly what to make out of it.  Patient is remaining on nasal oxygen, wounds are all appear to be healing well.  There is little bit of bleeding happening from multiple aspects or orifices are concerning CT did not show any malignancy or any abnormality otherwise.         Review of Systems   Constitutional:  Positive for fatigue. Negative for activity change and appetite change.   HENT:  Positive for nosebleeds. Negative for congestion.    Eyes: Negative.  Negative for visual disturbance.   Respiratory:  Positive for shortness of breath. Negative for apnea and cough.    Cardiovascular:  Positive for leg swelling. Negative for chest pain.   Gastrointestinal: Negative.  Negative for abdominal distention and blood in stool.   Musculoskeletal:  Positive for arthralgias and back pain.   Skin: Negative.    Neurological: Negative.  Negative for dizziness and numbness.   Psychiatric/Behavioral:  Positive for sleep disturbance. The patient is nervous/anxious.        Objective  /78   Pulse 77     Physical Exam  Constitutional:       Appearance:  Normal appearance. She is obese.      Comments: Looks tired   HENT:      Head: Normocephalic.      Nose: Nose normal.   Eyes:      Conjunctiva/sclera: Conjunctivae normal.   Cardiovascular:      Rate and Rhythm: Normal rate and regular rhythm.      Heart sounds: Normal heart sounds.   Pulmonary:      Effort: Pulmonary effort is normal.      Breath sounds: Normal breath sounds.   Abdominal:      Palpations: Abdomen is soft.   Musculoskeletal:         General: Normal range of motion.      Cervical back: Normal range of motion and neck supple.   Skin:     General: Skin is warm and dry.      Findings: Wound present.   Neurological:      General: No focal deficit present.      Mental Status: She is oriented to person, place, and time.  Assessment/Plan  Problem List Items Addressed This Visit       Complex regional pain syndrome type 1 of left upper extremity    PVD (peripheral vascular disease) (CMS/HCC) - Primary    Primary hypertension    Combined systolic and diastolic cardiac dysfunction     Other Visit Diagnoses       Subclavian vein stenosis        S/P vascular bypass        Hemoptysis            Discussed with the vascular, vascular follow-up next week, clopidogrel and Xarelto both are required, metoprolol and Lasix are required, gabapentin dose will be reduced, fatigue and tiredness remains, mild anemia persist, did not have any further chest pain, epistaxis and hemoptysis are intermittent and not severe or intractable.  Will monitor patient's condition, we will continue to monitor patient's hemoglobin and clinical state.  Nasal oxygen can be maintained, wounds are healing well as mentioned, discussed with nursing staff, there was no family member at bedside, periodic assessment and follow-up will be carried out.     Goals    None           Electronically Signed By: Chito Patel MD   7/16/23  2:19 PM

## 2023-07-13 ENCOUNTER — NURSING HOME VISIT (OUTPATIENT)
Dept: POST ACUTE CARE | Facility: EXTERNAL LOCATION | Age: 63
End: 2023-07-13
Payer: MEDICARE

## 2023-07-13 VITALS
BODY MASS INDEX: 35.31 KG/M2 | HEIGHT: 68 IN | RESPIRATION RATE: 18 BRPM | HEART RATE: 70 BPM | WEIGHT: 233 LBS | TEMPERATURE: 97.4 F | OXYGEN SATURATION: 93 % | DIASTOLIC BLOOD PRESSURE: 60 MMHG | SYSTOLIC BLOOD PRESSURE: 96 MMHG

## 2023-07-13 DIAGNOSIS — I73.9 PVD (PERIPHERAL VASCULAR DISEASE) (CMS-HCC): Primary | ICD-10-CM

## 2023-07-13 DIAGNOSIS — Z74.09 IMPAIRED FUNCTIONAL MOBILITY, BALANCE, GAIT, AND ENDURANCE: ICD-10-CM

## 2023-07-13 DIAGNOSIS — N18.2 TYPE 2 DIABETES MELLITUS WITH STAGE 2 CHRONIC KIDNEY DISEASE, WITH LONG-TERM CURRENT USE OF INSULIN (MULTI): ICD-10-CM

## 2023-07-13 DIAGNOSIS — Z79.4 TYPE 2 DIABETES MELLITUS WITH STAGE 2 CHRONIC KIDNEY DISEASE, WITH LONG-TERM CURRENT USE OF INSULIN (MULTI): ICD-10-CM

## 2023-07-13 DIAGNOSIS — E11.22 TYPE 2 DIABETES MELLITUS WITH STAGE 2 CHRONIC KIDNEY DISEASE, WITH LONG-TERM CURRENT USE OF INSULIN (MULTI): ICD-10-CM

## 2023-07-13 DIAGNOSIS — I51.89 COMBINED SYSTOLIC AND DIASTOLIC CARDIAC DYSFUNCTION: ICD-10-CM

## 2023-07-13 PROCEDURE — 99309 SBSQ NF CARE MODERATE MDM 30: CPT | Performed by: PHYSICIAN ASSISTANT

## 2023-07-13 NOTE — LETTER
"Patient: Daphney Chen  : 1960    Encounter Date: 2023  Name: Daphney Chen  YOB: 1960    Chief complaint: Impaired mobility. Recent  axillo-bifemoral bypass.    HPI: Patient seen and examined in her room. She is resting comfortably in bed. Patient is able to walk > 35 feet with walker and is making steady progress. She denies fever sob, chest pain, palpitation, dizziness, or pain in legs with exertion. Patient will have follow up with vascular service next week.   Extremity Weakness  The current episode started 1 to 4 weeks ago. The problem occurs daily. The problem has been gradually improving. Pertinent negatives include no anorexia, arthralgias, chest pain, chills, fever, joint swelling or urinary symptoms. The symptoms are aggravated by standing and walking. She has tried acetaminophen, walking and oral narcotics for the symptoms. The treatment provided moderate relief.     Review of systems:   ROS negative except were noted in HPI.    Code Status: full code    BP 96/60   Pulse 70   Temp 36.3 °C (97.4 °F)   Resp 18   Ht 1.727 m (5' 8\")   Wt 106 kg (233 lb)   SpO2 93%   BMI 35.43 kg/m²      Physical Exam  Constitutional:       General: She is not in acute distress.     Appearance: She is obese.   HENT:      Head: Normocephalic.      Nose: Nose normal. No congestion.      Mouth/Throat:      Mouth: Mucous membranes are moist.   Eyes:      Extraocular Movements: Extraocular movements intact.      Pupils: Pupils are equal, round, and reactive to light.   Cardiovascular:      Rate and Rhythm: Normal rate and regular rhythm.      Pulses: Normal pulses.      Heart sounds: Normal heart sounds.   Pulmonary:      Effort: Pulmonary effort is normal.      Breath sounds: Normal breath sounds. No wheezing or rales.   Abdominal:      General: Bowel sounds are normal.      Tenderness: There is no abdominal tenderness. There is no guarding.   Genitourinary:     Comments: " Voiding.  Musculoskeletal:         General: Normal range of motion.      Cervical back: Normal range of motion. No rigidity.   Lymphadenopathy:      Cervical: No cervical adenopathy.   Skin:     General: Skin is warm and dry.      Capillary Refill: Capillary refill takes less than 2 seconds.      R groin wound: dehiscence.  Neurological:      General: No focal deficit present.      Mental Status: She is alert and oriented to person, place, and time.   Psychiatric:         Mood and Affect: Mood normal.         Behavior: Behavior normal.     Medications reviewed during visit at facility.  Tylenol 650 mg po q 4 hour prn  MOM 30 ml po daily prn constipation   Levothyroxine 100 mcg po daily  Naloxone 4 mg one spray both nostrils daily  Plavix 75 mg po daily  Apixaban 5 mg po bid  Oxycodone 5 mg po q 4 hours prn  Senna 8.6/ 50 mg po daily  Lasix 20 mg po daily  Albuterol 108 mcg two puffs q 4 hours prn  Atorvastatin 80 mg po daily   Gabapentin 800 mg po tid  Incruse 62.5 one puff daily  Nitroglycerin 0.4 mg sl q 5 minutes x 3 dose chest pain  Metoprolol succinate 25 mg 1/2 tablet po daily  Metformin 1000 mg po bid  Humalog sliding scale insulin coverage  Labs reviewed at facility:     Laboratory Service Report [id number]   Patient Name   IVETTE GUILLAUME  Patient ID   [id number]  Age   63 Y  Gender   F  Order #      Ordering HENRI Hatfield  Patient Telephone #   [phone]     1960  AKTAYLOR CONTRERASPELLIVETTE   Client Order #   [id number]   Collection Date and Time   2023 06:45   Print Date and Time   07/15/2023 18:54  Account Information   [facility] NR   [id number] [place] [address]     Report Notes                        Test Results   Reference Perform  Unit  Value Site*             CBC and Differential  REPORTED 2023 09:29  White Blood Cell Count   6.22 k/uL 3.70-11.00    RBC L 3.38 m/uL 3.90-5.20    Hemoglobin L 10.3 g/dL 11.5-15.5    Hematocrit L 34.8 %  36.0-46.0    MCV H 103.0 fL 80.0-100.0    MCH   30.5 pg 26.0-34.0    MCHC L 29.6 g/dL 30.5-36.0    RDW-CV H 15.4 % 11.5-15.0    Platelet Count   177 k/uL 150-400    MPV   10.5 fL 9.0-12.7    Neut%   50.5 %    Abs Neut   3.14 k/uL 1.45-7.50    Lymph%   28.1 %    Abs Lymph   1.75 k/uL 1.00-4.00    Mono%   15.0 %    Abs Mono H 0.93 k/uL <0.87    Eosin%   5.3 %    Abs Eosin   0.33 k/uL <0.46    Baso%   0.8 %    Abs Baso   0.05 k/uL <0.11    Immature Gran %%   0.3 %    Abs Immature Gran   <0.03 k/uL <0.10    Absolute nRBC   0.0 /100 WBC    Absolute nRBC   <0.01 k/uL <0.01    Diff Type   Auto               Comp Metabolic Panel  REPORTED 07/12/2023 09:52  Protein, Total   6.6 g/dL 6.3-8.0    Albumin L 3.7 g/dL 3.9-4.9    Calcium, Total   9.4 mg/dL 8.5-10.2    Bilirubin, Total   0.6 mg/dL 0.2-1.3    Alkaline Phosphatase   121 U/L     AST   25 U/L 13-35    ALT   23 U/L 7-38    Glucose H 141 mg/dL 74-99  Comp Metabolic Panel  REPORTED 07/12/2023 09:52  Protein, Total   6.6 g/dL 6.3-8.0    Albumin L 3.7 g/dL 3.9-4.9    Calcium, Total   9.4 mg/dL 8.5-10.2    Bilirubin, Total   0.6 mg/dL 0.2-1.3    Alkaline Phosphatase   121 U/L     AST   25 U/L 13-35    ALT   23 U/L 7-38    Glucose H 141 mg/dL 74-99  BUN   12 mg/dL 7-21    Creatinine   0.88 mg/dL 0.58-0.96    Sodium   143 mmol/L 136-144    Potassium   4.1 mmol/L 3.7-5.1    Chloride   99 mmol/L     CO2 H 33 mmol/L 22-30    Anion Gap   11 mmol/L 9-18    Estimated Glomerular Filtration Rate   73 mL/min/1.73 meters squared >=60    Assessment/Plan   Problem List Items Addressed This Visit       PVD (peripheral vascular disease) (CMS/[facility]) - Primary     S/P Axillo-bifemoral bypass. Schedule follow up with LIANA Roland 7/20/23 at 3:40 pm [facility] Vascular Surgery. Continue with Apixaban 5 mg po bid. Plavix 75 mg po daily . ZOE/PVR testing 8/8/23 at 4:30pm [facility]          Type 2 diabetes mellitus, with long-term current use of insulin (CMS/[facility])     Review blood sugars. Blood  sugar today 311. Continue with Metformin 1000 mg po bid. Humalog sliding scale insulin           Combined systolic and diastolic cardiac dysfunction     Review labs. Continue with Lasix 20 mg po daily. Elevate legs when recumbent.         Impaired functional mobility, balance, gait, and endurance     Now walking > 35 feet with walker. Order home health care for discharge.            Time:    Jose Portillo PA-C       Electronically Signed By: Jose Portillo PA-C   7/15/23  7:01 PM

## 2023-07-14 NOTE — PROGRESS NOTES
"7/13/2023  Name: Daphney Chne  YOB: 1960    Chief complaint: Impaired mobility. Recent  axillo-bifemoral bypass.    HPI: Patient seen and examined in her room. She is resting comfortably in bed. Patient is able to walk > 35 feet with walker and is making steady progress. She denies fever sob, chest pain, palpitation, dizziness, or pain in legs with exertion. Patient will have follow up with vascular service next week.   Extremity Weakness  The current episode started 1 to 4 weeks ago. The problem occurs daily. The problem has been gradually improving. Pertinent negatives include no anorexia, arthralgias, chest pain, chills, fever, joint swelling or urinary symptoms. The symptoms are aggravated by standing and walking. She has tried acetaminophen, walking and oral narcotics for the symptoms. The treatment provided moderate relief.     Review of systems:   ROS negative except were noted in HPI.    Code Status: full code    BP 96/60   Pulse 70   Temp 36.3 °C (97.4 °F)   Resp 18   Ht 1.727 m (5' 8\")   Wt 106 kg (233 lb)   SpO2 93%   BMI 35.43 kg/m²      Physical Exam  Constitutional:       General: She is not in acute distress.     Appearance: She is obese.   HENT:      Head: Normocephalic.      Nose: Nose normal. No congestion.      Mouth/Throat:      Mouth: Mucous membranes are moist.   Eyes:      Extraocular Movements: Extraocular movements intact.      Pupils: Pupils are equal, round, and reactive to light.   Cardiovascular:      Rate and Rhythm: Normal rate and regular rhythm.      Pulses: Normal pulses.      Heart sounds: Normal heart sounds.   Pulmonary:      Effort: Pulmonary effort is normal.      Breath sounds: Normal breath sounds. No wheezing or rales.   Abdominal:      General: Bowel sounds are normal.      Tenderness: There is no abdominal tenderness. There is no guarding.   Genitourinary:     Comments: Voiding.  Musculoskeletal:         General: Normal range of motion.      " Cervical back: Normal range of motion. No rigidity.   Lymphadenopathy:      Cervical: No cervical adenopathy.   Skin:     General: Skin is warm and dry.      Capillary Refill: Capillary refill takes less than 2 seconds.      R groin wound: dehiscence.  Neurological:      General: No focal deficit present.      Mental Status: She is alert and oriented to person, place, and time.   Psychiatric:         Mood and Affect: Mood normal.         Behavior: Behavior normal.     Medications reviewed during visit at facility.  Tylenol 650 mg po q 4 hour prn  MOM 30 ml po daily prn constipation   Levothyroxine 100 mcg po daily  Naloxone 4 mg one spray both nostrils daily  Plavix 75 mg po daily  Apixaban 5 mg po bid  Oxycodone 5 mg po q 4 hours prn  Senna 8.6/ 50 mg po daily  Lasix 20 mg po daily  Albuterol 108 mcg two puffs q 4 hours prn  Atorvastatin 80 mg po daily   Gabapentin 800 mg po tid  Incruse 62.5 one puff daily  Nitroglycerin 0.4 mg sl q 5 minutes x 3 dose chest pain  Metoprolol succinate 25 mg 1/2 tablet po daily  Metformin 1000 mg po bid  Humalog sliding scale insulin coverage  Labs reviewed at facility:     Laboratory Service Report [id number]   Patient Name   IVETTE GUILLAUME  Patient ID   [id number]  Age   63 Y  Gender   F  Order #      Ordering HENRI Hatfield  Patient Telephone #   [phone]     1960  AKTAYLOR   IVETTE GUILLAUME   Client Order #   [id number]   Collection Date and Time   2023 06:45   Print Date and Time   07/15/2023 18:54  Account Information   [facility] [place]   [address] [place] [address]     Report Notes                        Test Results   Reference Perform  Unit  Value Site*             CBC and Differential  REPORTED 2023 09:29  White Blood Cell Count   6.22 k/uL 3.70-11.00    RBC L 3.38 m/uL 3.90-5.20    Hemoglobin L 10.3 g/dL 11.5-15.5    Hematocrit L 34.8 % 36.0-46.0    MCV H 103.0 fL 80.0-100.0    MCH   30.5 pg 26.0-34.0     MCHC L 29.6 g/dL 30.5-36.0    RDW-CV H 15.4 % 11.5-15.0    Platelet Count   177 k/uL 150-400    MPV   10.5 fL 9.0-12.7    Neut%   50.5 %    Abs Neut   3.14 k/uL 1.45-7.50    Lymph%   28.1 %    Abs Lymph   1.75 k/uL 1.00-4.00    Mono%   15.0 %    Abs Mono H 0.93 k/uL <0.87    Eosin%   5.3 %    Abs Eosin   0.33 k/uL <0.46    Baso%   0.8 %    Abs Baso   0.05 k/uL <0.11    Immature Gran %%   0.3 %    Abs Immature Gran   <0.03 k/uL <0.10    Absolute nRBC   0.0 /100 WBC    Absolute nRBC   <0.01 k/uL <0.01    Diff Type   Auto               Comp Metabolic Panel  REPORTED 07/12/2023 09:52  Protein, Total   6.6 g/dL 6.3-8.0    Albumin L 3.7 g/dL 3.9-4.9    Calcium, Total   9.4 mg/dL 8.5-10.2    Bilirubin, Total   0.6 mg/dL 0.2-1.3    Alkaline Phosphatase   121 U/L     AST   25 U/L 13-35    ALT   23 U/L 7-38    Glucose H 141 mg/dL 74-99  Comp Metabolic Panel  REPORTED 07/12/2023 09:52  Protein, Total   6.6 g/dL 6.3-8.0    Albumin L 3.7 g/dL 3.9-4.9    Calcium, Total   9.4 mg/dL 8.5-10.2    Bilirubin, Total   0.6 mg/dL 0.2-1.3    Alkaline Phosphatase   121 U/L     AST   25 U/L 13-35    ALT   23 U/L 7-38    Glucose H 141 mg/dL 74-99  BUN   12 mg/dL 7-21    Creatinine   0.88 mg/dL 0.58-0.96    Sodium   143 mmol/L 136-144    Potassium   4.1 mmol/L 3.7-5.1    Chloride   99 mmol/L     CO2 H 33 mmol/L 22-30    Anion Gap   11 mmol/L 9-18    Estimated Glomerular Filtration Rate   73 mL/min/1.73 meters squared >=60    Assessment/Plan    Problem List Items Addressed This Visit       PVD (peripheral vascular disease) (CMS/[facility]) - Primary     S/P Axillo-bifemoral bypass. Schedule follow up with LIANA Roland 7/20/23 at 3:40 pm [facility]. Continue with Apixaban 5 mg po bid. Plavix 75 mg po daily . ZOE/PVR testing 8/8/23 at 4:30pm [facility]          Type 2 diabetes mellitus, with long-term current use of insulin (CMS/[facility])     Review blood sugars. Blood sugar today 311. Continue with Metformin 1000 mg po bid. Humalog  sliding scale insulin           Combined systolic and diastolic cardiac dysfunction     Review labs. Continue with Lasix 20 mg po daily. Elevate legs when recumbent.         Impaired functional mobility, balance, gait, and endurance     Now walking > 35 feet with walker. Order home health care for discharge.            Time:    Jose Portillo PA-C

## 2023-07-15 ASSESSMENT — ENCOUNTER SYMPTOMS
ARTHRALGIAS: 0
EXTREMITY WEAKNESS: 1
ANOREXIA: 0
CHILLS: 0
JOINT SWELLING: 0
FEVER: 0

## 2023-07-15 NOTE — ASSESSMENT & PLAN NOTE
Review blood sugars. Blood sugar today 311. Continue with Metformin 1000 mg po bid. Humalog sliding scale insulin

## 2023-07-15 NOTE — ASSESSMENT & PLAN NOTE
S/P Axillo-bifemoral bypass. Schedule follow up with LIANA Roland 7/20/23 at 3:40 pm [facility]ascular Surgery. Continue with Apixaban 5 mg po bid. Plavix 75 mg po daily . ZOE/PVR testing 8/8/23 at 4:30pm [facility]

## 2023-07-16 VITALS — DIASTOLIC BLOOD PRESSURE: 78 MMHG | SYSTOLIC BLOOD PRESSURE: 110 MMHG | HEART RATE: 77 BPM

## 2023-07-16 ASSESSMENT — ENCOUNTER SYMPTOMS
ARTHRALGIAS: 1
ABDOMINAL DISTENTION: 0
EYES NEGATIVE: 1
APNEA: 0
ACTIVITY CHANGE: 0
BACK PAIN: 1
SHORTNESS OF BREATH: 1
COUGH: 0
APPETITE CHANGE: 0
SLEEP DISTURBANCE: 1
NUMBNESS: 0
DIZZINESS: 0
BLOOD IN STOOL: 0
NERVOUS/ANXIOUS: 1
GASTROINTESTINAL NEGATIVE: 1
FATIGUE: 1
NEUROLOGICAL NEGATIVE: 1

## 2023-07-16 NOTE — PROGRESS NOTES
Subjective   Patient ID: Daphney Chen is a 63 y.o. female who is acute skilled care being seen and evaluated for multiple medical problems.    Patient was seen by me again because after I saw this patient last time I have a chance to talk with a vascular physician and it was decided that the patient has a axillary femoral bypass surgery so some sort of anticoagulation is required so we have changed to Xarelto 2.5 mg twice a day and patient has a coronary PCI done that means patient will require clopidogrel.  As I am seeing this patient patient is going to follow-up with cardiology today, patient still feels weak still weak and tired, still patient has some nosebleeds and also little bit of blood coming with the cough, the ultrasound revealed a slight prominence of endometrial stripe, they could not say exactly what to make out of it.  Patient is remaining on nasal oxygen, wounds are all appear to be healing well.  There is little bit of bleeding happening from multiple aspects or orifices are concerning CT did not show any malignancy or any abnormality otherwise.         Review of Systems   Constitutional:  Positive for fatigue. Negative for activity change and appetite change.   HENT:  Positive for nosebleeds. Negative for congestion.    Eyes: Negative.  Negative for visual disturbance.   Respiratory:  Positive for shortness of breath. Negative for apnea and cough.    Cardiovascular:  Positive for leg swelling. Negative for chest pain.   Gastrointestinal: Negative.  Negative for abdominal distention and blood in stool.   Musculoskeletal:  Positive for arthralgias and back pain.   Skin: Negative.    Neurological: Negative.  Negative for dizziness and numbness.   Psychiatric/Behavioral:  Positive for sleep disturbance. The patient is nervous/anxious.        Objective   /78   Pulse 77     Physical Exam  Constitutional:       Appearance: Normal appearance. She is obese.      Comments: Looks tired   HENT:       Head: Normocephalic.      Nose: Nose normal.   Eyes:      Conjunctiva/sclera: Conjunctivae normal.   Cardiovascular:      Rate and Rhythm: Normal rate and regular rhythm.      Heart sounds: Normal heart sounds.   Pulmonary:      Effort: Pulmonary effort is normal.      Breath sounds: Normal breath sounds.   Abdominal:      Palpations: Abdomen is soft.   Musculoskeletal:         General: Normal range of motion.      Cervical back: Normal range of motion and neck supple.   Skin:     General: Skin is warm and dry.      Findings: Wound present.   Neurological:      General: No focal deficit present.      Mental Status: She is oriented to person, place, and time.  Assessment/Plan   Problem List Items Addressed This Visit       Complex regional pain syndrome type 1 of left upper extremity    PVD (peripheral vascular disease) (CMS/HCC) - Primary    Primary hypertension    Combined systolic and diastolic cardiac dysfunction     Other Visit Diagnoses       Subclavian vein stenosis        S/P vascular bypass        Hemoptysis            Discussed with the vascular, vascular follow-up next week, clopidogrel and Xarelto both are required, metoprolol and Lasix are required, gabapentin dose will be reduced, fatigue and tiredness remains, mild anemia persist, did not have any further chest pain, epistaxis and hemoptysis are intermittent and not severe or intractable.  Will monitor patient's condition, we will continue to monitor patient's hemoglobin and clinical state.  Nasal oxygen can be maintained, wounds are healing well as mentioned, discussed with nursing staff, there was no family member at bedside, periodic assessment and follow-up will be carried out.     Goals    None

## 2023-08-22 PROBLEM — M79.602 CHRONIC PAIN OF LEFT UPPER EXTREMITY: Status: ACTIVE | Noted: 2020-10-07

## 2023-08-22 PROBLEM — E66.811 OBESITY, CLASS I, BMI 30-34.9: Status: ACTIVE | Noted: 2022-05-19

## 2023-08-22 PROBLEM — E66.9 CLASS 2 OBESITY WITH BODY MASS INDEX (BMI) OF 36.0 TO 36.9 IN ADULT: Status: ACTIVE | Noted: 2023-08-22

## 2023-08-22 PROBLEM — M79.606 LEG PAIN: Status: ACTIVE | Noted: 2023-08-22

## 2023-08-22 PROBLEM — F17.200 NICOTINE USE DISORDER: Status: ACTIVE | Noted: 2020-10-08

## 2023-08-22 PROBLEM — D65 DISSEMINATED INTRAVASCULAR COAGULATION (MULTI): Status: ACTIVE | Noted: 2018-07-30

## 2023-08-22 PROBLEM — M54.50 CHRONIC LOW BACK PAIN: Status: ACTIVE | Noted: 2020-10-07

## 2023-08-22 PROBLEM — I25.10 CAD (CORONARY ARTERY DISEASE): Status: ACTIVE | Noted: 2023-08-22

## 2023-08-22 PROBLEM — M79.642 PAIN OF LEFT HAND: Status: ACTIVE | Noted: 2023-08-22

## 2023-08-22 PROBLEM — G89.18 POSTOPERATIVE PAIN AFTER SPINAL SURGERY: Status: ACTIVE | Noted: 2022-05-21

## 2023-08-22 PROBLEM — G89.29 CHRONIC PAIN OF LEFT UPPER EXTREMITY: Status: ACTIVE | Noted: 2020-10-07

## 2023-08-22 PROBLEM — T79.6XXA: Status: ACTIVE | Noted: 2018-07-30

## 2023-08-22 PROBLEM — E66.812 CLASS 2 OBESITY WITH BODY MASS INDEX (BMI) OF 36.0 TO 36.9 IN ADULT: Status: ACTIVE | Noted: 2023-08-22

## 2023-08-22 PROBLEM — E78.2 MIXED DIABETIC HYPERLIPIDEMIA ASSOCIATED WITH TYPE 2 DIABETES MELLITUS (MULTI): Status: ACTIVE | Noted: 2023-08-22

## 2023-08-22 PROBLEM — R63.5 WEIGHT GAIN: Status: ACTIVE | Noted: 2023-08-22

## 2023-08-22 PROBLEM — K21.9 GASTROESOPHAGEAL REFLUX DISEASE WITHOUT ESOPHAGITIS: Status: ACTIVE | Noted: 2022-05-09

## 2023-08-22 PROBLEM — M19.90 OSTEOARTHROSIS: Status: ACTIVE | Noted: 2019-11-27

## 2023-08-22 PROBLEM — I71.21 ASCENDING AORTIC ANEURYSM (CMS-HCC): Status: ACTIVE | Noted: 2023-08-22

## 2023-08-22 PROBLEM — R60.1: Status: ACTIVE | Noted: 2023-08-22

## 2023-08-22 PROBLEM — R93.89 ABNORMAL COMPUTED TOMOGRAPHY ANGIOGRAPHY (CTA): Status: ACTIVE | Noted: 2023-08-22

## 2023-08-22 PROBLEM — E66.9 OBESITY, CLASS I, BMI 30-34.9: Status: ACTIVE | Noted: 2022-05-19

## 2023-08-22 PROBLEM — N30.90 CYSTITIS: Status: ACTIVE | Noted: 2020-10-07

## 2023-08-22 PROBLEM — E11.59 HYPERTENSION ASSOCIATED WITH TYPE 2 DIABETES MELLITUS (MULTI): Status: ACTIVE | Noted: 2023-08-22

## 2023-08-22 PROBLEM — M50.30 DEGENERATION OF CERVICAL INTERVERTEBRAL DISC: Status: ACTIVE | Noted: 2019-11-27

## 2023-08-22 PROBLEM — I77.1 SUBCLAVIAN ARTERY STENOSIS, LEFT (CMS-HCC): Status: ACTIVE | Noted: 2023-08-22

## 2023-08-22 PROBLEM — G89.29 CHRONIC LOW BACK PAIN: Status: ACTIVE | Noted: 2020-10-07

## 2023-08-22 PROBLEM — I71.43 INFRARENAL ABDOMINAL AORTIC ANEURYSM (AAA) WITHOUT RUPTURE (CMS-HCC): Status: ACTIVE | Noted: 2023-08-22

## 2023-08-22 PROBLEM — I73.9 PAD (PERIPHERAL ARTERY DISEASE) (CMS-HCC): Status: ACTIVE | Noted: 2023-08-22

## 2023-08-22 PROBLEM — E11.69 MIXED DIABETIC HYPERLIPIDEMIA ASSOCIATED WITH TYPE 2 DIABETES MELLITUS (MULTI): Status: ACTIVE | Noted: 2023-08-22

## 2023-08-22 PROBLEM — R60.0 BILATERAL LEG EDEMA: Status: ACTIVE | Noted: 2023-08-22

## 2023-08-22 PROBLEM — M48.061 SPINAL STENOSIS OF LUMBAR REGION: Status: ACTIVE | Noted: 2018-07-30

## 2023-08-22 PROBLEM — I15.2 HYPERTENSION ASSOCIATED WITH TYPE 2 DIABETES MELLITUS (MULTI): Status: ACTIVE | Noted: 2023-08-22

## 2023-08-22 PROBLEM — M96.0 PSEUDARTHROSIS FOLLOWING SPINAL FUSION: Status: ACTIVE | Noted: 2022-06-03

## 2023-08-22 PROBLEM — T79.A12A: Status: ACTIVE | Noted: 2018-03-15

## 2023-08-22 RX ORDER — ACETAMINOPHEN 325 MG/1
650 TABLET ORAL
COMMUNITY
Start: 2022-06-03 | End: 2023-12-05 | Stop reason: WASHOUT

## 2023-08-22 RX ORDER — MIRTAZAPINE 15 MG/1
15 TABLET, FILM COATED ORAL NIGHTLY
COMMUNITY
Start: 2020-05-12 | End: 2023-10-03 | Stop reason: ALTCHOICE

## 2023-08-22 RX ORDER — SERTRALINE HYDROCHLORIDE 50 MG/1
TABLET, FILM COATED ORAL
COMMUNITY
Start: 2019-01-09 | End: 2023-10-03 | Stop reason: ALTCHOICE

## 2023-08-22 RX ORDER — METHOCARBAMOL 750 MG/1
750 TABLET, FILM COATED ORAL 4 TIMES DAILY
COMMUNITY
Start: 2017-07-17 | End: 2023-10-03 | Stop reason: ALTCHOICE

## 2023-08-22 RX ORDER — TRAZODONE HYDROCHLORIDE 150 MG/1
TABLET ORAL
COMMUNITY
Start: 2019-01-09 | End: 2023-10-03 | Stop reason: ALTCHOICE

## 2023-08-22 RX ORDER — BUPROPION HYDROCHLORIDE 300 MG/1
300 TABLET ORAL 3 TIMES DAILY
COMMUNITY
Start: 2020-12-30 | End: 2023-10-03 | Stop reason: ALTCHOICE

## 2023-08-22 RX ORDER — OXYCODONE HYDROCHLORIDE 15 MG/1
15 TABLET ORAL EVERY 6 HOURS PRN
COMMUNITY
Start: 2017-08-09

## 2023-08-22 RX ORDER — GABAPENTIN 600 MG/1
600 TABLET ORAL EVERY 8 HOURS PRN
COMMUNITY
Start: 2017-10-05 | End: 2023-10-03 | Stop reason: ALTCHOICE

## 2023-08-22 RX ORDER — GABAPENTIN 300 MG/1
CAPSULE ORAL
COMMUNITY
Start: 2016-02-04 | End: 2023-10-03 | Stop reason: ALTCHOICE

## 2023-08-22 RX ORDER — SENNOSIDES 8.6 MG/1
8.6 TABLET ORAL
COMMUNITY
Start: 2022-06-02 | End: 2023-11-07 | Stop reason: ALTCHOICE

## 2023-08-22 RX ORDER — ATORVASTATIN CALCIUM 80 MG/1
80 TABLET, FILM COATED ORAL NIGHTLY
COMMUNITY
End: 2024-05-07 | Stop reason: SDUPTHER

## 2023-08-22 RX ORDER — OXYCODONE HYDROCHLORIDE 10 MG/1
TABLET ORAL
COMMUNITY
Start: 2016-02-22 | End: 2023-10-03 | Stop reason: ALTCHOICE

## 2023-08-22 RX ORDER — HYDROXYZINE PAMOATE 50 MG/1
50 CAPSULE ORAL EVERY 4 HOURS PRN
COMMUNITY
Start: 2017-07-17 | End: 2023-11-07 | Stop reason: ALTCHOICE

## 2023-08-22 RX ORDER — GABAPENTIN 400 MG/1
400 CAPSULE ORAL 3 TIMES DAILY
COMMUNITY

## 2023-08-22 RX ORDER — OMEPRAZOLE 40 MG/1
CAPSULE, DELAYED RELEASE ORAL
COMMUNITY
Start: 2021-02-02 | End: 2023-10-03 | Stop reason: ALTCHOICE

## 2023-08-22 RX ORDER — NITROGLYCERIN 0.4 MG/1
0.4 TABLET SUBLINGUAL EVERY 5 MIN PRN
COMMUNITY
Start: 2023-02-03

## 2023-08-22 RX ORDER — NAPROXEN SODIUM 220 MG/1
1 TABLET, FILM COATED ORAL DAILY
COMMUNITY
Start: 2023-02-03

## 2023-08-22 RX ORDER — LANCETS 33 GAUGE
EACH MISCELLANEOUS DAILY
COMMUNITY
Start: 2023-02-07

## 2023-08-22 RX ORDER — HYDROXYZINE HYDROCHLORIDE 50 MG/1
50 TABLET, FILM COATED ORAL NIGHTLY
COMMUNITY
Start: 2021-06-15 | End: 2023-10-03 | Stop reason: ALTCHOICE

## 2023-08-22 RX ORDER — LEVOTHYROXINE SODIUM 100 UG/1
100 TABLET ORAL DAILY
COMMUNITY
Start: 2023-07-16 | End: 2023-12-05 | Stop reason: SDUPTHER

## 2023-08-22 RX ORDER — BLOOD-GLUCOSE METER
EACH MISCELLANEOUS DAILY
COMMUNITY
Start: 2023-08-05

## 2023-08-22 RX ORDER — DOCUSATE SODIUM 100 MG/1
1 CAPSULE, LIQUID FILLED ORAL 2 TIMES DAILY
COMMUNITY
Start: 2022-06-02 | End: 2023-11-07 | Stop reason: ALTCHOICE

## 2023-08-22 RX ORDER — TORSEMIDE 20 MG/1
40 TABLET ORAL DAILY
COMMUNITY
Start: 2023-08-11 | End: 2023-11-17

## 2023-08-22 RX ORDER — GABAPENTIN 800 MG/1
800 TABLET ORAL 3 TIMES DAILY
COMMUNITY
End: 2023-10-03 | Stop reason: ALTCHOICE

## 2023-08-22 RX ORDER — IBUPROFEN 800 MG/1
.5-1 TABLET ORAL EVERY 8 HOURS PRN
COMMUNITY
Start: 2022-11-18 | End: 2023-10-03 | Stop reason: ALTCHOICE

## 2023-08-22 RX ORDER — DULOXETIN HYDROCHLORIDE 20 MG/1
20 CAPSULE, DELAYED RELEASE ORAL DAILY
COMMUNITY
Start: 2017-07-17 | End: 2023-10-03 | Stop reason: ALTCHOICE

## 2023-08-22 RX ORDER — OMEPRAZOLE 20 MG/1
20 CAPSULE, DELAYED RELEASE ORAL DAILY
COMMUNITY
End: 2023-11-07 | Stop reason: ALTCHOICE

## 2023-08-22 RX ORDER — NALOXONE HYDROCHLORIDE 4 MG/.1ML
4 SPRAY NASAL AS NEEDED
COMMUNITY
Start: 2020-03-17

## 2023-08-22 RX ORDER — VENLAFAXINE HYDROCHLORIDE 150 MG/1
TABLET, EXTENDED RELEASE ORAL
COMMUNITY
Start: 2022-01-17 | End: 2023-10-03 | Stop reason: ALTCHOICE

## 2023-08-22 RX ORDER — PETROLATUM,WHITE 41 %
1 OINTMENT (GRAM) TOPICAL AS NEEDED
COMMUNITY
Start: 2018-11-20 | End: 2023-10-03 | Stop reason: ALTCHOICE

## 2023-08-22 RX ORDER — DULOXETIN HYDROCHLORIDE 30 MG/1
CAPSULE, DELAYED RELEASE ORAL
COMMUNITY
Start: 2016-02-04 | End: 2023-11-07 | Stop reason: ALTCHOICE

## 2023-08-22 RX ORDER — LITHIUM CARBONATE 300 MG
TABLET ORAL
COMMUNITY
Start: 2020-11-30 | End: 2023-10-03 | Stop reason: ALTCHOICE

## 2023-08-22 RX ORDER — METHYLPREDNISOLONE 4 MG/1
TABLET ORAL
COMMUNITY
Start: 2022-07-08 | End: 2023-10-03 | Stop reason: ALTCHOICE

## 2023-08-22 RX ORDER — PREGABALIN 75 MG/1
1 CAPSULE ORAL 2 TIMES DAILY
COMMUNITY
Start: 2018-03-01 | End: 2023-10-03 | Stop reason: ALTCHOICE

## 2023-08-22 RX ORDER — ACETAMINOPHEN, DIPHENHYDRAMINE HCL, PHENYLEPHRINE HCL 325; 25; 5 MG/1; MG/1; MG/1
1 TABLET ORAL NIGHTLY
COMMUNITY
Start: 2019-11-25

## 2023-08-22 RX ORDER — FLUTICASONE FUROATE AND VILANTEROL 100; 25 UG/1; UG/1
1 POWDER RESPIRATORY (INHALATION) DAILY
COMMUNITY
End: 2023-10-03 | Stop reason: ALTCHOICE

## 2023-08-22 RX ORDER — OXYCODONE HYDROCHLORIDE 5 MG/1
5-10 TABLET ORAL
COMMUNITY
Start: 2022-06-02 | End: 2023-10-03 | Stop reason: ALTCHOICE

## 2023-08-22 RX ORDER — METHOCARBAMOL 500 MG/1
1 TABLET, FILM COATED ORAL 3 TIMES DAILY
COMMUNITY
Start: 2022-06-02 | End: 2023-10-03 | Stop reason: ALTCHOICE

## 2023-08-22 RX ORDER — LOSARTAN POTASSIUM 50 MG/1
50 TABLET ORAL DAILY
COMMUNITY
Start: 2023-08-11 | End: 2024-02-20 | Stop reason: ALTCHOICE

## 2023-08-22 RX ORDER — BACLOFEN 10 MG/1
1 TABLET ORAL 3 TIMES DAILY
COMMUNITY
Start: 2017-12-07 | End: 2023-10-03 | Stop reason: ALTCHOICE

## 2023-08-22 RX ORDER — METFORMIN HYDROCHLORIDE 1000 MG/1
1000 TABLET ORAL
COMMUNITY
Start: 2023-08-04 | End: 2023-12-05 | Stop reason: SDUPTHER

## 2023-08-22 RX ORDER — ALBUTEROL SULFATE 90 UG/1
2 AEROSOL, METERED RESPIRATORY (INHALATION) EVERY 4 HOURS
COMMUNITY
Start: 2022-06-02

## 2023-08-22 RX ORDER — TOPIRAMATE 25 MG/1
TABLET ORAL
COMMUNITY
Start: 2020-04-09 | End: 2024-02-20 | Stop reason: WASHOUT

## 2023-08-22 RX ORDER — METOPROLOL SUCCINATE 25 MG/1
25 TABLET, EXTENDED RELEASE ORAL DAILY
COMMUNITY

## 2023-08-22 RX ORDER — BUPROPION HYDROCHLORIDE 100 MG/1
TABLET, EXTENDED RELEASE ORAL
COMMUNITY
Start: 2019-01-09 | End: 2023-10-03 | Stop reason: ALTCHOICE

## 2023-08-22 RX ORDER — IBUPROFEN 600 MG/1
600 TABLET ORAL EVERY 8 HOURS PRN
COMMUNITY
Start: 2017-12-07 | End: 2023-10-03 | Stop reason: ALTCHOICE

## 2023-08-22 RX ORDER — TIOTROPIUM BROMIDE INHALATION SPRAY 3.12 UG/1
2 SPRAY, METERED RESPIRATORY (INHALATION) DAILY
COMMUNITY
Start: 2023-02-06

## 2023-08-22 RX ORDER — INSULIN LISPRO 100 [IU]/ML
INJECTION, SOLUTION INTRAVENOUS; SUBCUTANEOUS 3 TIMES DAILY
COMMUNITY
Start: 2023-07-21 | End: 2023-11-07 | Stop reason: ALTCHOICE

## 2023-08-22 RX ORDER — SERTRALINE HYDROCHLORIDE 100 MG/1
100 TABLET, FILM COATED ORAL
COMMUNITY
Start: 2019-11-22 | End: 2023-10-03 | Stop reason: ALTCHOICE

## 2023-08-22 RX ORDER — CHLORHEXIDINE GLUCONATE ORAL RINSE 1.2 MG/ML
SOLUTION DENTAL
COMMUNITY
Start: 2023-06-09 | End: 2023-10-03 | Stop reason: ALTCHOICE

## 2023-08-22 RX ORDER — OXYCODONE HYDROCHLORIDE 15 MG/1
15 TABLET ORAL EVERY 4 HOURS PRN
COMMUNITY
Start: 2023-08-14 | End: 2023-09-13

## 2023-08-22 RX ORDER — FUROSEMIDE 40 MG/1
20 TABLET ORAL DAILY
COMMUNITY
Start: 2023-03-02 | End: 2023-10-03 | Stop reason: ALTCHOICE

## 2023-08-22 RX ORDER — CLOPIDOGREL BISULFATE 75 MG/1
75 TABLET ORAL DAILY
COMMUNITY
End: 2023-10-03 | Stop reason: ALTCHOICE

## 2023-08-22 RX ORDER — IBUPROFEN 400 MG/1
400 TABLET ORAL EVERY 8 HOURS
COMMUNITY
Start: 2017-10-03 | End: 2023-10-03 | Stop reason: ALTCHOICE

## 2023-10-03 ENCOUNTER — OFFICE VISIT (OUTPATIENT)
Dept: CARDIOLOGY | Facility: CLINIC | Age: 63
End: 2023-10-03
Payer: MEDICARE

## 2023-10-03 VITALS
HEIGHT: 67 IN | DIASTOLIC BLOOD PRESSURE: 78 MMHG | WEIGHT: 231 LBS | SYSTOLIC BLOOD PRESSURE: 108 MMHG | BODY MASS INDEX: 36.26 KG/M2 | HEART RATE: 76 BPM

## 2023-10-03 DIAGNOSIS — I10 PRIMARY HYPERTENSION: ICD-10-CM

## 2023-10-03 DIAGNOSIS — E11.69 MIXED DIABETIC HYPERLIPIDEMIA ASSOCIATED WITH TYPE 2 DIABETES MELLITUS (MULTI): ICD-10-CM

## 2023-10-03 DIAGNOSIS — I73.9 PVD (PERIPHERAL VASCULAR DISEASE) (CMS-HCC): ICD-10-CM

## 2023-10-03 DIAGNOSIS — E11.59 HYPERTENSION ASSOCIATED WITH TYPE 2 DIABETES MELLITUS (MULTI): ICD-10-CM

## 2023-10-03 DIAGNOSIS — I25.10 CORONARY ARTERY DISEASE INVOLVING NATIVE CORONARY ARTERY OF NATIVE HEART WITHOUT ANGINA PECTORIS: ICD-10-CM

## 2023-10-03 DIAGNOSIS — I15.2 HYPERTENSION ASSOCIATED WITH TYPE 2 DIABETES MELLITUS (MULTI): ICD-10-CM

## 2023-10-03 DIAGNOSIS — I51.89 COMBINED SYSTOLIC AND DIASTOLIC CARDIAC DYSFUNCTION: ICD-10-CM

## 2023-10-03 DIAGNOSIS — R60.0 LOCALIZED EDEMA: ICD-10-CM

## 2023-10-03 DIAGNOSIS — E66.9 OBESITY, CLASS I, BMI 30-34.9: ICD-10-CM

## 2023-10-03 DIAGNOSIS — E78.2 MIXED DIABETIC HYPERLIPIDEMIA ASSOCIATED WITH TYPE 2 DIABETES MELLITUS (MULTI): ICD-10-CM

## 2023-10-03 PROCEDURE — 3066F NEPHROPATHY DOC TX: CPT | Performed by: INTERNAL MEDICINE

## 2023-10-03 PROCEDURE — 3008F BODY MASS INDEX DOCD: CPT | Performed by: INTERNAL MEDICINE

## 2023-10-03 PROCEDURE — 1036F TOBACCO NON-USER: CPT | Performed by: INTERNAL MEDICINE

## 2023-10-03 PROCEDURE — 3078F DIAST BP <80 MM HG: CPT | Performed by: INTERNAL MEDICINE

## 2023-10-03 PROCEDURE — 99214 OFFICE O/P EST MOD 30 MIN: CPT | Performed by: INTERNAL MEDICINE

## 2023-10-03 PROCEDURE — 1159F MED LIST DOCD IN RCRD: CPT | Performed by: INTERNAL MEDICINE

## 2023-10-03 PROCEDURE — 3074F SYST BP LT 130 MM HG: CPT | Performed by: INTERNAL MEDICINE

## 2023-10-03 PROCEDURE — 3044F HG A1C LEVEL LT 7.0%: CPT | Performed by: INTERNAL MEDICINE

## 2023-10-03 PROCEDURE — 4010F ACE/ARB THERAPY RXD/TAKEN: CPT | Performed by: INTERNAL MEDICINE

## 2023-10-03 RX ORDER — POTASSIUM CHLORIDE 20 MEQ/1
20 TABLET, EXTENDED RELEASE ORAL DAILY
Qty: 30 TABLET | Refills: 11 | Status: SHIPPED | OUTPATIENT
Start: 2023-10-03 | End: 2023-10-06 | Stop reason: SDUPTHER

## 2023-10-03 RX ORDER — METOLAZONE 2.5 MG/1
2.5 TABLET ORAL DAILY
Qty: 12 TABLET | Refills: 11 | Status: SHIPPED | OUTPATIENT
Start: 2023-10-03 | End: 2023-10-06 | Stop reason: SDUPTHER

## 2023-10-03 NOTE — PROGRESS NOTES
CARDIOLOGY OFFICE VISIT      CHIEF COMPLAINT  Chief Complaint   Patient presents with    Follow-up     3 month       HISTORY OF PRESENT ILLNESS  63-year-old lady with severe peripheral vascular disease including stenosis of both the left carotid arteries and subclavian vein for which she had by past surgery with a right axillary to bifemoral bypass with a ringed PTFE in June 2023, secondary to infrarenal aortic occlusion causing critical limb ischemia.  She did well postoperatively but developed recurrent lower extremity swelling.  Her Xarelto was reduced to low-dose secondary to hemoptysis and GI bleed.  She also has a history of CAD with cardiac catheterization showed occluded left circumflex filling with collaterals, mid RCA with 100% subtotal occlusion treated with drug-eluting stent.  She has not had any recurrent chest pain since then.  She denies orthopnea proximal dyspnea.  Unfortunately she continues to smoke.  Lipids from January 2023 show significantly elevated cholesterol of 250 with an LDL of 147 triglycerides 178    Past Medical History  Patient Active Problem List   Diagnosis    Complex regional pain syndrome type 1 of left upper extremity    PVD (peripheral vascular disease) (CMS/HCC)    Primary hypertension    Type 2 diabetes mellitus, with long-term current use of insulin (CMS/HCC)    Vaginal bleeding    Combined systolic and diastolic cardiac dysfunction    Impaired functional mobility, balance, gait, and endurance    Cystitis    Gastroesophageal reflux disease without esophagitis    Hypothyroidism    Major depressive disorder, single episode, moderate (CMS/HCC)    Degeneration of lumbar intervertebral disc    Disseminated intravascular coagulation (CMS/HCC)    Nicotine use disorder    Obesity, Class I, BMI 30-34.9    Osteoarthrosis    Other voice and resonance disorders    Postoperative pain after spinal surgery    Posttraumatic muscle contracture following injury (CMS/HCC)    Pseudarthrosis  following spinal fusion    Contusion of lower back    Lumbar radiculopathy    Sprain of sacroiliac ligament    Spinal stenosis of lumbar region    Sprain, low back    Tobacco use disorder    Traumatic compartment syndrome of left upper extremity (CMS/HCC)    Chronic low back pain    Chronic pain of left upper extremity    Pain of left hand    Degeneration of cervical intervertebral disc    Abnormal computed tomography angiography (CTA)    Ascending aortic aneurysm (CMS/HCC)    Asymptomatic stenosis of left carotid artery    Bilateral leg edema    CAD (coronary artery disease)    Class 2 obesity with body mass index (BMI) of 36.0 to 36.9 in adult    Hypertension associated with type 2 diabetes mellitus (CMS/HCC)    Infrarenal abdominal aortic aneurysm (AAA) without rupture (CMS/HCC)    Leg pain    Mixed diabetic hyperlipidemia associated with type 2 diabetes mellitus (CMS/HCC)    Moderate generalized edema    PAD (peripheral artery disease) (CMS/HCC)    Subclavian artery stenosis, left (CMS/HCC)    Weight gain        Social History  Social History     Tobacco Use    Smoking status: Former     Types: Cigarettes     Quit date: 2020     Years since quitting: 3.7    Smokeless tobacco: Never   Substance Use Topics    Alcohol use: Not Currently    Drug use: Not on file       Family History     Family History   Problem Relation Name Age of Onset    Diabetes Mother      Hypertension Mother      Heart attack Mother      Heart attack Daughter          Allergies:  Allergies   Allergen Reactions    Morphine Itching and Rash     Feels like bugs are crawling on her        Outpatient Medications:  Current Outpatient Medications   Medication Instructions    acetaminophen (TYLENOL) 650 mg    albuterol 90 mcg/actuation inhaler 2 puffs, inhalation, Every 4 hours    aspirin 81 mg chewable tablet 1 tablet, oral, Daily    atorvastatin (LIPITOR) 80 mg, oral, Nightly    docusate sodium (Colace) 100 mg capsule 1 capsule, oral, 2 times daily     DULoxetine (Cymbalta) 30 mg DR capsule     furosemide (LASIX) 20 mg, oral, Daily    gabapentin (NEURONTIN) 400 mg, oral, 3 times daily    HumaLOG KwikPen Insulin 100 unit/mL injection Inject under the skin 3 times a day. Per sliding scale- blood sugar 150 or less 0 units, 151-200 2 units, 201-250 4 units, 251-300 6 units, 301-350 8 units, 351-400 10 units.    hydrOXYzine pamoate (VISTARIL) 50 mg, oral, Every 4 hours PRN, TAKE WHILE MEDICATING WITH OXYCODONE 15 MG    levothyroxine (SYNTHROID, LEVOXYL) 100 mcg, oral, Daily    losartan (COZAAR) 50 mg, oral, Daily    melatonin 10 mg tablet 1 tablet, oral, Nightly    metFORMIN (GLUCOPHAGE) 1,000 mg, oral, 2 times daily with meals    metoprolol succinate XL (TOPROL-XL) 25 mg, oral, Daily    naloxone (NARCAN) 4 mg, nasal, As needed    nitroglycerin (NITROSTAT) 0.4 mg, sublingual, Every 5 min PRN    omeprazole (PRILOSEC) 20 mg, oral, Daily    OneTouch Delica Plus Lancet 33 gauge misc Daily, AS DIRECTED    OneTouch Verio test strips strip Daily    oxyCODONE (ROXICODONE) 15 mg, oral, Every 6 hours PRN    rivaroxaban (XARELTO) 2.5 mg, oral, 2 times daily    sennosides (SENOKOT) 8.6 mg    Spiriva Respimat 2.5 mcg/actuation inhaler 2 puffs, inhalation, Daily    topiramate (Topamax) 25 mg tablet     torsemide (DEMADEX) 40 mg, oral, Daily          REVIEW OF SYSTEMS  Review of Systems   All other systems reviewed and are negative.         VITALS  Vitals:    10/03/23 1520   BP: 108/78   Pulse: 76       PHYSICAL EXAM  Constitutional:       Appearance: Healthy appearance. Not in distress.   Neck:      Vascular: No JVR. JVD normal.   Pulmonary:      Effort: Pulmonary effort is normal.      Breath sounds: Normal breath sounds. No wheezing. No rhonchi. No rales.   Chest:      Chest wall: Not tender to palpatation.   Cardiovascular:      PMI at left midclavicular line. Normal rate. Regular rhythm. Normal S1. Normal S2.       Murmurs: There is a grade 2/6 high frequency systolic murmur  "at the URSB.      No gallop.  No click. No rub.      Comments: Right Carotid bruit  Pulses:     Intact distal pulses.   Edema:     Peripheral edema present.     Ankle: bilateral 2+ edema of the ankle.  Abdominal:      General: Bowel sounds are normal.      Palpations: Abdomen is soft.      Tenderness: There is no abdominal tenderness.   Musculoskeletal: Normal range of motion.         General: No tenderness. Skin:     General: Skin is warm and dry.   Neurological:      General: No focal deficit present.      Mental Status: Alert and oriented to person, place and time.            Cardiac Studies    No echocardiogram results found for the past 12 months     No results found for this or any previous visit (from the past 4464 hour(s)).     Cath    Lab Results   Component Value Date    CHOL 250 (H) 01/26/2023     Lab Results   Component Value Date    HDL 67.7 01/26/2023     No results found for: \"LDLCALC\"  Lab Results   Component Value Date    TRIG 178 (H) 01/26/2023     No components found for: \"CHOLHDL\"        ASSESSMENT AND PLAN  Severe peripheral vascular disease: S/p recent axillary-bifemoral bypass as noted above in the history of AAA with infrarenal aortic occlusion.  She is on low-dose Xarelto.  As noted above secondary to GI bleed and hemoptysis.  She has noticed recurrent lower extremity swelling and she has repeat ultrasound pending of the lower extremities.  In the meantime she is a little confused about her medications, she is instructed to continue with torsemide 20 mg twice a day and we will add metolazone 2.5 mg to be taking on Mondays Wednesdays and Fridays.  We will Tamra Gallardo 20 mEq for potassium supplementation.  We will repeat her metabolic profile prior to her next visit as scheduled.  2.  CAD with recent drug-eluting stent as noted above in February 2023 with no recurrent chest pain, continue lifelong aspirin.  3.  Hypertension: Blood pressures well controlled current medications.  4.  Dyslipidemia: " Lipid profile still suboptimal suboptimal, consider Repatha when she returns for follow-up.    [unfilled]

## 2023-10-04 ENCOUNTER — TELEPHONE (OUTPATIENT)
Dept: CARDIOLOGY | Facility: CLINIC | Age: 63
End: 2023-10-04
Payer: MEDICARE

## 2023-10-04 ENCOUNTER — DOCUMENTATION (OUTPATIENT)
Dept: CARDIOLOGY | Facility: CLINIC | Age: 63
End: 2023-10-04

## 2023-10-04 ENCOUNTER — HOSPITAL ENCOUNTER (OUTPATIENT)
Dept: RADIOLOGY | Facility: HOSPITAL | Age: 63
Discharge: HOME | End: 2023-10-04
Payer: MEDICARE

## 2023-10-04 DIAGNOSIS — E11.59 HYPERTENSION ASSOCIATED WITH TYPE 2 DIABETES MELLITUS (MULTI): ICD-10-CM

## 2023-10-04 DIAGNOSIS — I73.9 PERIPHERAL VASCULAR DISEASE, UNSPECIFIED (CMS-HCC): ICD-10-CM

## 2023-10-04 DIAGNOSIS — R60.0 LOCALIZED EDEMA: ICD-10-CM

## 2023-10-04 DIAGNOSIS — I71.21 ANEURYSM OF THE ASCENDING AORTA, WITHOUT RUPTURE (CMS-HCC): ICD-10-CM

## 2023-10-04 DIAGNOSIS — I65.22 OCCLUSION AND STENOSIS OF LEFT CAROTID ARTERY: ICD-10-CM

## 2023-10-04 DIAGNOSIS — I15.2 HYPERTENSION ASSOCIATED WITH TYPE 2 DIABETES MELLITUS (MULTI): ICD-10-CM

## 2023-10-04 PROCEDURE — 93922 UPR/L XTREMITY ART 2 LEVELS: CPT

## 2023-10-04 RX ORDER — POTASSIUM CHLORIDE 20 MEQ/1
20 TABLET, EXTENDED RELEASE ORAL DAILY
Qty: 30 TABLET | Refills: 11 | Status: CANCELLED | OUTPATIENT
Start: 2023-10-04 | End: 2024-10-03

## 2023-10-04 NOTE — TELEPHONE ENCOUNTER
Incoming Call  Outgoing Call  Other []Show Permanent Comments  My Quick Buttons                Date/Time Type Contact Phone/Fax           10/04/2023 02:12 PM EDT by Bruna Fuller LPN  Incoming Daphney Chen (Self) [phone] (Mobile)     Remove   pt. left vm to notify Dr. Wellington from [facility] yesterday, that she IS taking Torsemide 20mg BID currently

## 2023-10-05 NOTE — TELEPHONE ENCOUNTER
Patient stopped into office asking for clarification of Torsemide. Went over medication with patient .  She is to take two a day. She was also given script for potassium 20 meq one a day.

## 2023-10-06 ENCOUNTER — APPOINTMENT (OUTPATIENT)
Dept: CARDIOLOGY | Facility: CLINIC | Age: 63
End: 2023-10-06
Payer: MEDICARE

## 2023-10-06 DIAGNOSIS — R60.0 LOCALIZED EDEMA: ICD-10-CM

## 2023-10-08 DIAGNOSIS — R60.0 LOCALIZED EDEMA: ICD-10-CM

## 2023-10-08 RX ORDER — METOLAZONE 2.5 MG/1
2.5 TABLET ORAL DAILY
Qty: 12 TABLET | Refills: 11 | Status: SHIPPED | OUTPATIENT
Start: 2023-10-08 | End: 2023-10-09 | Stop reason: SDUPTHER

## 2023-10-08 RX ORDER — POTASSIUM CHLORIDE 20 MEQ/1
20 TABLET, EXTENDED RELEASE ORAL DAILY
Qty: 30 TABLET | Refills: 11 | Status: SHIPPED | OUTPATIENT
Start: 2023-10-08 | End: 2023-10-12

## 2023-10-09 RX ORDER — METOLAZONE 2.5 MG/1
2.5 TABLET ORAL 3 TIMES WEEKLY
Qty: 12 TABLET | Refills: 3 | Status: SHIPPED | OUTPATIENT
Start: 2023-10-09 | End: 2023-10-12 | Stop reason: SDUPTHER

## 2023-10-12 ENCOUNTER — TRANSCRIBE ORDERS (OUTPATIENT)
Dept: VASCULAR SURGERY | Facility: CLINIC | Age: 63
End: 2023-10-12

## 2023-10-12 ENCOUNTER — OFFICE VISIT (OUTPATIENT)
Dept: VASCULAR SURGERY | Facility: CLINIC | Age: 63
End: 2023-10-12
Payer: MEDICARE

## 2023-10-12 VITALS
OXYGEN SATURATION: 93 % | DIASTOLIC BLOOD PRESSURE: 74 MMHG | TEMPERATURE: 97.1 F | HEART RATE: 73 BPM | RESPIRATION RATE: 16 BRPM | SYSTOLIC BLOOD PRESSURE: 95 MMHG

## 2023-10-12 DIAGNOSIS — I73.9 PAD (PERIPHERAL ARTERY DISEASE) (CMS-HCC): ICD-10-CM

## 2023-10-12 DIAGNOSIS — I73.9 PAD (PERIPHERAL ARTERY DISEASE) (CMS-HCC): Primary | ICD-10-CM

## 2023-10-12 DIAGNOSIS — Z09 FOLLOW-UP EXAM: ICD-10-CM

## 2023-10-12 PROCEDURE — 3074F SYST BP LT 130 MM HG: CPT | Performed by: SURGERY

## 2023-10-12 PROCEDURE — 1159F MED LIST DOCD IN RCRD: CPT | Performed by: SURGERY

## 2023-10-12 PROCEDURE — 3008F BODY MASS INDEX DOCD: CPT | Performed by: SURGERY

## 2023-10-12 PROCEDURE — 4010F ACE/ARB THERAPY RXD/TAKEN: CPT | Performed by: SURGERY

## 2023-10-12 PROCEDURE — 99214 OFFICE O/P EST MOD 30 MIN: CPT | Performed by: SURGERY

## 2023-10-12 PROCEDURE — 3044F HG A1C LEVEL LT 7.0%: CPT | Performed by: SURGERY

## 2023-10-12 PROCEDURE — 3078F DIAST BP <80 MM HG: CPT | Performed by: SURGERY

## 2023-10-12 PROCEDURE — 1036F TOBACCO NON-USER: CPT | Performed by: SURGERY

## 2023-10-12 PROCEDURE — 3066F NEPHROPATHY DOC TX: CPT | Performed by: SURGERY

## 2023-10-12 RX ORDER — POTASSIUM CHLORIDE 20 MEQ/1
TABLET, EXTENDED RELEASE ORAL
Qty: 90 TABLET | Refills: 3 | Status: SHIPPED | OUTPATIENT
Start: 2023-10-12

## 2023-10-12 RX ORDER — METOLAZONE 2.5 MG/1
TABLET ORAL
Qty: 38 TABLET | Refills: 3 | Status: SHIPPED | OUTPATIENT
Start: 2023-10-12 | End: 2023-11-07 | Stop reason: SDUPTHER

## 2023-10-12 NOTE — PROGRESS NOTES
Vascular Surgery Clinic Note    CC: PAD    HPI:  Daphney Chen is 63 y.o. female with history of aortic occlusion sp R axillary to bifemoral bypass in 6/2023. She continues to have neuropathic pain and swelling in the feet. ZOE are stable, near normal. She has severe COPD and CAD and is unable to lie flat and elevate her legs.     Medical History:   has no past medical history on file.    Meds:   Current Outpatient Medications on File Prior to Visit   Medication Sig Dispense Refill    acetaminophen (Tylenol) 325 mg tablet 2 tablets (650 mg).      albuterol 90 mcg/actuation inhaler Inhale 2 puffs every 4 hours.      aspirin 81 mg chewable tablet Chew 1 tablet (81 mg) once daily.      atorvastatin (Lipitor) 80 mg tablet Take 1 tablet (80 mg) by mouth once daily at bedtime.      docusate sodium (Colace) 100 mg capsule Take 1 capsule (100 mg) by mouth twice a day.      gabapentin (Neurontin) 400 mg capsule Take 1 capsule (400 mg) by mouth 3 times a day.      levothyroxine (Synthroid, Levoxyl) 100 mcg tablet Take 1 tablet (100 mcg) by mouth once daily.      losartan (Cozaar) 50 mg tablet Take 1 tablet (50 mg) by mouth once daily.      melatonin 10 mg tablet Take 1 tablet (10 mg) by mouth once daily at bedtime.      metFORMIN (Glucophage) 1,000 mg tablet Take 1 tablet (1,000 mg) by mouth 2 times a day with meals.      metOLazone (Zaroxolyn) 2.5 mg tablet TAKE 1 TABLET BY MOUTH EVERY DAY 1/2 HOUR BEFORE TORSEMIDE ON MONDAY, WEDNESDAY AND FRIDAY 38 tablet 3    metoprolol succinate XL (Toprol-XL) 25 mg 24 hr tablet Take 1 tablet (25 mg) by mouth once daily.      nitroglycerin (Nitrostat) 0.4 mg SL tablet Place 1 tablet (0.4 mg) under the tongue every 5 minutes if needed for chest pain.      omeprazole (PriLOSEC) 20 mg DR capsule Take 1 capsule (20 mg) by mouth once daily.      OneTouch Delica Plus Lancet 33 gauge misc once daily. AS DIRECTED      OneTouch Verio test strips strip once daily.      oxyCODONE (Roxicodone) 15  mg immediate release tablet Take 1 tablet (15 mg) by mouth every 6 hours if needed.      potassium chloride CR 20 mEq ER tablet TAKE 1 TABLET(20 MEQ) BY MOUTH EVERY DAY. DO NOT CRUSH OR CHEW 90 tablet 3    rivaroxaban (Xarelto) 2.5 mg tablet Take 1 tablet (2.5 mg) by mouth 2 times a day.      Spiriva Respimat 2.5 mcg/actuation inhaler Inhale 2 puffs once daily.      topiramate (Topamax) 25 mg tablet       torsemide (Demadex) 20 mg tablet Take 2 tablets (40 mg) by mouth once daily.      DULoxetine (Cymbalta) 30 mg DR capsule       HumaLOG KwikPen Insulin 100 unit/mL injection Inject under the skin 3 times a day. Per sliding scale- blood sugar 150 or less 0 units, 151-200 2 units, 201-250 4 units, 251-300 6 units, 301-350 8 units, 351-400 10 units.      hydrOXYzine pamoate (Vistaril) 50 mg capsule Take 1 capsule (50 mg) by mouth every 4 hours if needed. TAKE WHILE MEDICATING WITH OXYCODONE 15 MG      naloxone (Narcan) 4 mg/0.1 mL nasal spray Administer 1 spray (4 mg) into affected nostril(s) if needed for opioid reversal.      sennosides (Senokot) 8.6 mg tablet 1 tablet (8.6 mg).      [DISCONTINUED] metOLazone (Zaroxolyn) 2.5 mg tablet Take 1 tablet (2.5 mg) by mouth once daily. Take one tablet 1/2 hour before  torsemide on Monday Wednesday and Friday 12 tablet 11    [DISCONTINUED] metOLazone (Zaroxolyn) 2.5 mg tablet Take 1 tablet (2.5 mg) by mouth 3 (three) times a week. Monday, Wednesday and Friday only 12 tablet 3    [DISCONTINUED] metOLazone (Zaroxolyn) 2.5 mg tablet Take 1 tablet (2.5 mg) by mouth once daily. Take one tablet 1/2 hour before  torsemide on Monday Wednesday and Friday 12 tablet 11    [DISCONTINUED] potassium chloride CR (Klor-Con M20) 20 mEq ER tablet Take 1 tablet (20 mEq) by mouth once daily. Do not crush or chew. 30 tablet 11    [DISCONTINUED] potassium chloride CR (Klor-Con M20) 20 mEq ER tablet Take 1 tablet (20 mEq) by mouth once daily. Do not crush or chew. 30 tablet 11     No current  facility-administered medications on file prior to visit.        Allergies:   Allergies   Allergen Reactions    Morphine Itching and Rash     Feels like bugs are crawling on her       SH:    Social Determinants of Health     Tobacco Use: Medium Risk (10/12/2023)    Patient History     Smoking Tobacco Use: Former     Smokeless Tobacco Use: Never     Passive Exposure: Not on file   Alcohol Use: Not on file   Financial Resource Strain: Not on file   Food Insecurity: Not on file   Transportation Needs: Not on file   Physical Activity: Not on file   Stress: Not on file   Social Connections: Not on file   Intimate Partner Violence: Not on file   Depression: Not at risk (2/13/2023)    PHQ-2     PHQ-2 Score: 1   Housing Stability: Not on file   Utilities: Not on file   Digital Equity: Not on file        FH:  Family History   Problem Relation Name Age of Onset    Diabetes Mother      Hypertension Mother      Heart attack Mother      Heart attack Daughter          ROS:  All systems were reviewed and are negative except as per HPI.    Objective:  Vitals:  Vitals:    10/12/23 1346   BP: 95/74   Pulse: 73   Resp: 16   Temp: 36.2 °C (97.1 °F)   SpO2: 93%        Exam:  In NAD, well appearing  Abd Soft, ND/NT  Vascular examination:  Aubile loud, doppler signals PT/DP bilaterally  Femoral pulses difficult to palpate 2/2 obesity    Assessment & Plan:  Daphney Chen is 63 y.o. female s/p R axillary-bifemoral bypass 6/2023. Doing well. Chronic lower leg pains. RTC 6 mo with ZOE and graft surveillance duplex.      No LOS data to display         Tom Rosas M.D.

## 2023-10-13 ENCOUNTER — TRANSCRIBE ORDERS (OUTPATIENT)
Dept: VASCULAR SURGERY | Facility: CLINIC | Age: 63
End: 2023-10-13
Payer: MEDICARE

## 2023-10-13 DIAGNOSIS — I73.9 PAD (PERIPHERAL ARTERY DISEASE) (CMS-HCC): ICD-10-CM

## 2023-10-13 DIAGNOSIS — Z09 FOLLOW-UP EXAM: ICD-10-CM

## 2023-10-30 PROCEDURE — 93922 UPR/L XTREMITY ART 2 LEVELS: CPT | Performed by: SURGERY

## 2023-11-01 ENCOUNTER — LAB (OUTPATIENT)
Dept: LAB | Facility: LAB | Age: 63
End: 2023-11-01
Payer: MEDICARE

## 2023-11-01 DIAGNOSIS — R60.0 LOCALIZED EDEMA: ICD-10-CM

## 2023-11-01 LAB
ANION GAP SERPL CALC-SCNC: 11 MMOL/L (ref 10–20)
BUN SERPL-MCNC: 20 MG/DL (ref 6–23)
CALCIUM SERPL-MCNC: 8.9 MG/DL (ref 8.6–10.3)
CHLORIDE SERPL-SCNC: 94 MMOL/L (ref 98–107)
CO2 SERPL-SCNC: 31 MMOL/L (ref 21–32)
CREAT SERPL-MCNC: 0.95 MG/DL (ref 0.5–1.05)
GFR SERPL CREATININE-BSD FRML MDRD: 67 ML/MIN/1.73M*2
GLUCOSE SERPL-MCNC: 277 MG/DL (ref 74–99)
POTASSIUM SERPL-SCNC: 4.3 MMOL/L (ref 3.5–5.3)
SODIUM SERPL-SCNC: 132 MMOL/L (ref 136–145)

## 2023-11-01 PROCEDURE — 36415 COLL VENOUS BLD VENIPUNCTURE: CPT

## 2023-11-01 PROCEDURE — 80048 BASIC METABOLIC PNL TOTAL CA: CPT

## 2023-11-07 ENCOUNTER — OFFICE VISIT (OUTPATIENT)
Dept: CARDIOLOGY | Facility: CLINIC | Age: 63
End: 2023-11-07
Payer: MEDICARE

## 2023-11-07 VITALS
HEART RATE: 64 BPM | BODY MASS INDEX: 35.28 KG/M2 | WEIGHT: 232.8 LBS | SYSTOLIC BLOOD PRESSURE: 132 MMHG | DIASTOLIC BLOOD PRESSURE: 84 MMHG | HEIGHT: 68 IN

## 2023-11-07 DIAGNOSIS — I25.10 CORONARY ARTERY DISEASE INVOLVING NATIVE CORONARY ARTERY OF NATIVE HEART WITHOUT ANGINA PECTORIS: Primary | ICD-10-CM

## 2023-11-07 DIAGNOSIS — R60.0 LOCALIZED EDEMA: ICD-10-CM

## 2023-11-07 PROCEDURE — 3079F DIAST BP 80-89 MM HG: CPT | Performed by: INTERNAL MEDICINE

## 2023-11-07 PROCEDURE — 3008F BODY MASS INDEX DOCD: CPT | Performed by: INTERNAL MEDICINE

## 2023-11-07 PROCEDURE — 1159F MED LIST DOCD IN RCRD: CPT | Performed by: INTERNAL MEDICINE

## 2023-11-07 PROCEDURE — 3066F NEPHROPATHY DOC TX: CPT | Performed by: INTERNAL MEDICINE

## 2023-11-07 PROCEDURE — 3075F SYST BP GE 130 - 139MM HG: CPT | Performed by: INTERNAL MEDICINE

## 2023-11-07 PROCEDURE — 4010F ACE/ARB THERAPY RXD/TAKEN: CPT | Performed by: INTERNAL MEDICINE

## 2023-11-07 PROCEDURE — 1036F TOBACCO NON-USER: CPT | Performed by: INTERNAL MEDICINE

## 2023-11-07 PROCEDURE — 3044F HG A1C LEVEL LT 7.0%: CPT | Performed by: INTERNAL MEDICINE

## 2023-11-07 PROCEDURE — 99214 OFFICE O/P EST MOD 30 MIN: CPT | Performed by: INTERNAL MEDICINE

## 2023-11-07 RX ORDER — METOLAZONE 2.5 MG/1
2.5 TABLET ORAL 2 TIMES WEEKLY
Qty: 24 TABLET | Refills: 1 | Status: SHIPPED | OUTPATIENT
Start: 2023-11-09 | End: 2023-12-05 | Stop reason: WASHOUT

## 2023-11-07 ASSESSMENT — ENCOUNTER SYMPTOMS
RESPIRATORY NEGATIVE: 1
NEUROLOGICAL NEGATIVE: 1
CONSTITUTIONAL NEGATIVE: 1

## 2023-11-07 NOTE — PROGRESS NOTES
CARDIOLOGY OFFICE VISIT      CHIEF COMPLAINT  Chief Complaint   Patient presents with    Follow-up     6 month        HISTORY OF PRESENT ILLNESS  Daphney Chen is a 63 y.o. year old female patient with severe peripheral vascular disease s/p bilateral femoropopliteal bypass.  She complains of persistent lower extremity swelling for which she was recently started on metolazone 2.5 mg 3 times a day and she now returns for follow-up.  She also has CAD with cardiac catheterization that showed occluded left circumflex filling with collaterals, 100% mid RCA occlusion treated with a drug-eluting stent in February 2023.  She is s/p right axillary to bifemoral bypass with a ringed PTFE in June 2023.  LV function is normal on recent echocardiogram  Recent metabolic profile so sodium is down to 132 from 140 with normal potassium of 4.3    ASSESSMENT AND PLAN  1.  CAD with multivessel CAD CAD s/p RCA stent as noted above, will continue current dual antiplatelet therapy.  2.  PAD s/p right axillary to bifemoral bypass as noted above.  She is on low-dose Xarelto which she has been continue.  3.  Lower extremity swelling: She still has some residual lower extremity swelling, but in view of relative hyponatremia, we will reduce her metolazone to 2.5 mg 2 days a week on Mondays and Thursdays.  We will continue with current dose of torsemide.  Problem List Items Addressed This Visit          Cardiac and Vasculature    CAD (coronary artery disease) - Primary    Relevant Orders    Basic metabolic panel     Other Visit Diagnoses       Localized edema        Relevant Medications    metOLazone (Zaroxolyn) 2.5 mg tablet (Start on 11/9/2023)            Past Medical History  History reviewed. No pertinent past medical history.    Social History  Social History     Tobacco Use    Smoking status: Former     Types: Cigarettes     Quit date: 2020     Years since quitting: 3.8    Smokeless tobacco: Never   Substance Use Topics    Alcohol use:  Not Currently    Drug use: Never       Family History     Family History   Problem Relation Name Age of Onset    Diabetes Mother      Hypertension Mother      Heart attack Mother      Heart attack Daughter          Allergies:  Allergies   Allergen Reactions    Morphine Itching and Rash     Feels like bugs are crawling on her        Outpatient Medications:  Current Outpatient Medications   Medication Instructions    acetaminophen (TYLENOL) 650 mg    albuterol 90 mcg/actuation inhaler 2 puffs, inhalation, Every 4 hours    aspirin 81 mg chewable tablet 1 tablet, oral, Daily    atorvastatin (LIPITOR) 80 mg, oral, Nightly    gabapentin (NEURONTIN) 400 mg, oral, 3 times daily    levothyroxine (SYNTHROID, LEVOXYL) 100 mcg, oral, Daily    losartan (COZAAR) 50 mg, oral, Daily    melatonin 10 mg tablet 1 tablet, oral, Nightly    metFORMIN (GLUCOPHAGE) 1,000 mg, oral, 2 times daily with meals    metOLazone (Zaroxolyn) 2.5 mg tablet TAKE 1 TABLET BY MOUTH EVERY DAY 1/2 HOUR BEFORE TORSEMIDE ON MONDAY, WEDNESDAY AND FRIDAY    metoprolol succinate XL (TOPROL-XL) 25 mg, oral, Daily    naloxone (NARCAN) 4 mg, nasal, As needed    nitroglycerin (NITROSTAT) 0.4 mg, sublingual, Every 5 min PRN    OneTouch Delica Plus Lancet 33 gauge misc Daily, AS DIRECTED    OneTouch Verio test strips strip Daily    oxyCODONE (ROXICODONE) 15 mg, oral, Every 6 hours PRN    potassium chloride CR 20 mEq ER tablet TAKE 1 TABLET(20 MEQ) BY MOUTH EVERY DAY. DO NOT CRUSH OR CHEW    rivaroxaban (XARELTO) 2.5 mg, oral, 2 times daily    Spiriva Respimat 2.5 mcg/actuation inhaler 2 puffs, inhalation, Daily    topiramate (Topamax) 25 mg tablet     torsemide (DEMADEX) 40 mg, oral, Daily        Recent Lab Results:    CBC:   Lab Results   Component Value Date    WBC 9.6 06/30/2023    RBC 3.34 (L) 06/30/2023    HGB 10.4 (L) 06/30/2023    HCT 34.5 (L) 06/30/2023     06/30/2023        CMP:    Lab Results   Component Value Date     (L) 11/01/2023    K 4.3  11/01/2023    CL 94 (L) 11/01/2023    CO2 31 11/01/2023    BUN 20 11/01/2023    CREATININE 0.95 11/01/2023    GLUCOSE 277 (H) 11/01/2023    CALCIUM 8.9 11/01/2023       Magnesium:    Lab Results   Component Value Date    MG 2.27 06/29/2023       Lipid Profile:    Lab Results   Component Value Date    TRIG 178 (H) 01/26/2023    HDL 67.7 01/26/2023       TSH:    Lab Results   Component Value Date    TSH 8.89 (H) 06/25/2023       BNP:   Lab Results   Component Value Date     (H) 06/24/2023        PT/INR:    Lab Results   Component Value Date    PROTIME 13.1 (H) 06/24/2023    INR 1.2 (H) 06/24/2023       HgBA1c:    Lab Results   Component Value Date    HGBA1C 6.7 (A) 06/15/2023       BMP:  Lab Results   Component Value Date     (L) 11/01/2023    NA CANCELED 07/01/2023     06/30/2023     06/29/2023    K 4.3 11/01/2023    K CANCELED 07/01/2023    K 4.3 06/30/2023    K 4.3 06/29/2023    CL 94 (L) 11/01/2023    CL CANCELED 07/01/2023    CL 98 06/30/2023    CL 97 (L) 06/29/2023    CO2 31 11/01/2023    CO2 CANCELED 07/01/2023    CO2 35 (H) 06/30/2023    CO2 36 (H) 06/29/2023    BUN 20 11/01/2023    BUN CANCELED 07/01/2023    BUN 24 (H) 06/30/2023    BUN 22 06/29/2023    CREATININE 0.95 11/01/2023    CREATININE CANCELED 07/01/2023    CREATININE 1.05 06/30/2023    CREATININE 0.92 06/29/2023       CBC:  Lab Results   Component Value Date    WBC 9.6 06/30/2023    WBC 9.8 06/29/2023    WBC 9.5 06/28/2023    RBC 3.34 (L) 06/30/2023    RBC 3.37 (L) 06/29/2023    RBC 3.11 (L) 06/28/2023    HGB 10.4 (L) 06/30/2023    HGB 10.4 (L) 06/29/2023    HGB 9.6 (L) 06/28/2023    HCT 34.5 (L) 06/30/2023    HCT 34.7 (L) 06/29/2023    HCT 32.4 (L) 06/28/2023     (H) 06/30/2023     (H) 06/29/2023     (H) 06/28/2023    MCHC 30.1 (L) 06/30/2023    MCHC 30.0 (L) 06/29/2023    MCHC 29.6 (L) 06/28/2023    RDW 16.1 (H) 06/30/2023    RDW 16.4 (H) 06/29/2023    RDW 16.5 (H) 06/28/2023     06/30/2023      06/29/2023     06/28/2023       Cardiac Enzymes:    Lab Results   Component Value Date    TROPHS 245 (H) 06/25/2023    TROPHS 126 (H) 06/24/2023    TROPHS 108 (HH) 06/24/2023       Hepatic Function Panel:    Lab Results   Component Value Date    ALKPHOS 96 06/28/2023    ALT 77 (H) 06/28/2023    AST 58 (H) 06/28/2023    PROT 6.2 (L) 06/28/2023    BILITOT 0.6 06/28/2023    BILIDIR 0.9 (H) 06/15/2023       Recent Cardiovascular Testing:    Echo-  Stress-  Cath-    REVIEW OF SYSTEMS  Review of Systems   Constitutional: Negative.   Cardiovascular:  Positive for leg swelling.   Respiratory: Negative.     Neurological: Negative.    All other systems reviewed and are negative.      VITALS  Vitals:    11/07/23 1513   BP: 132/84   Pulse: 64     Wt Readings from Last 4 Encounters:   11/07/23 106 kg (232 lb 12.8 oz)   10/03/23 105 kg (231 lb)   07/20/23 105 kg (232 lb)   07/13/23 105 kg (232 lb)       PHYSICAL EXAM  Constitutional:       Appearance: Healthy appearance. Not in distress.   Eyes:      Conjunctiva/sclera: Conjunctivae normal.      Pupils: Pupils are equal, round, and reactive to light.   Neck:      Vascular: No JVR. JVD normal.   Pulmonary:      Effort: Pulmonary effort is normal.      Breath sounds: Normal breath sounds. No wheezing. No rhonchi. No rales.   Chest:      Chest wall: Not tender to palpatation.   Cardiovascular:      PMI at left midclavicular line. Normal rate. Regular rhythm. Normal S1. Normal S2.       Murmurs:  2/6 S E Murmur LSB      No gallop.  No click. No rub.   Pulses:     Intact distal pulses.   Edema:     Peripheral edema absent.      Comments: 2+ bilateral lower extremity edema  Right greater than left  Abdominal:      Tenderness: There is no abdominal tenderness.   Musculoskeletal: Normal range of motion.         General: No tenderness.      Cervical back: Normal range of motion. Skin:     General: Skin is warm and dry.   Neurological:      General: No focal deficit present.       Mental Status: Alert and oriented to person, place and time.             [unfilled]

## 2023-11-08 DIAGNOSIS — E11.59 HYPERTENSION ASSOCIATED WITH TYPE 2 DIABETES MELLITUS (MULTI): ICD-10-CM

## 2023-11-08 DIAGNOSIS — I15.2 HYPERTENSION ASSOCIATED WITH TYPE 2 DIABETES MELLITUS (MULTI): ICD-10-CM

## 2023-11-17 RX ORDER — TORSEMIDE 20 MG/1
40 TABLET ORAL DAILY
Qty: 180 TABLET | Refills: 3 | Status: SHIPPED | OUTPATIENT
Start: 2023-11-17 | End: 2024-02-20 | Stop reason: ALTCHOICE

## 2023-11-30 ENCOUNTER — LAB (OUTPATIENT)
Dept: LAB | Facility: LAB | Age: 63
End: 2023-11-30
Payer: MEDICARE

## 2023-11-30 DIAGNOSIS — E11.9 TYPE 2 DIABETES MELLITUS WITHOUT COMPLICATIONS (MULTI): ICD-10-CM

## 2023-11-30 DIAGNOSIS — E11.59 TYPE 2 DIABETES MELLITUS WITH OTHER CIRCULATORY COMPLICATIONS (MULTI): ICD-10-CM

## 2023-11-30 DIAGNOSIS — E55.0 RICKETS, ACTIVE: ICD-10-CM

## 2023-11-30 DIAGNOSIS — I71.43 INFRARENAL ABDOMINAL AORTIC ANEURYSM, WITHOUT RUPTURE (CMS-HCC): ICD-10-CM

## 2023-11-30 DIAGNOSIS — R60.0 LOCALIZED EDEMA: Primary | ICD-10-CM

## 2023-11-30 DIAGNOSIS — I25.10 CORONARY ARTERY DISEASE INVOLVING NATIVE CORONARY ARTERY OF NATIVE HEART WITHOUT ANGINA PECTORIS: ICD-10-CM

## 2023-11-30 LAB
25(OH)D3 SERPL-MCNC: 8 NG/ML (ref 30–100)
ALBUMIN SERPL BCP-MCNC: 3.8 G/DL (ref 3.4–5)
ALP SERPL-CCNC: 136 U/L (ref 33–136)
ALT SERPL W P-5'-P-CCNC: 22 U/L (ref 7–45)
ANION GAP SERPL CALC-SCNC: 12 MMOL/L (ref 10–20)
APPEARANCE UR: ABNORMAL
AST SERPL W P-5'-P-CCNC: 29 U/L (ref 9–39)
BACTERIA #/AREA URNS AUTO: ABNORMAL /HPF
BASOPHILS # BLD AUTO: 0.12 X10*3/UL (ref 0–0.1)
BASOPHILS NFR BLD AUTO: 1.7 %
BILIRUB SERPL-MCNC: 0.7 MG/DL (ref 0–1.2)
BILIRUB UR STRIP.AUTO-MCNC: NEGATIVE MG/DL
BUN SERPL-MCNC: 14 MG/DL (ref 6–23)
CALCIUM SERPL-MCNC: 9 MG/DL (ref 8.6–10.3)
CHLORIDE SERPL-SCNC: 100 MMOL/L (ref 98–107)
CHOLEST SERPL-MCNC: 116 MG/DL (ref 0–199)
CHOLESTEROL/HDL RATIO: 2
CO2 SERPL-SCNC: 30 MMOL/L (ref 21–32)
COLOR UR: YELLOW
CREAT SERPL-MCNC: 0.91 MG/DL (ref 0.5–1.05)
EOSINOPHIL # BLD AUTO: 0.27 X10*3/UL (ref 0–0.7)
EOSINOPHIL NFR BLD AUTO: 3.7 %
ERYTHROCYTE [DISTWIDTH] IN BLOOD BY AUTOMATED COUNT: 18.7 % (ref 11.5–14.5)
EST. AVERAGE GLUCOSE BLD GHB EST-MCNC: 177 MG/DL
GFR SERPL CREATININE-BSD FRML MDRD: 70 ML/MIN/1.73M*2
GLUCOSE SERPL-MCNC: 129 MG/DL (ref 74–99)
GLUCOSE UR STRIP.AUTO-MCNC: NEGATIVE MG/DL
HBA1C MFR BLD: 7.8 %
HCT VFR BLD AUTO: 41.9 % (ref 36–46)
HDLC SERPL-MCNC: 58.6 MG/DL
HGB BLD-MCNC: 12.6 G/DL (ref 12–16)
IMM GRANULOCYTES # BLD AUTO: 0.01 X10*3/UL (ref 0–0.7)
IMM GRANULOCYTES NFR BLD AUTO: 0.1 % (ref 0–0.9)
KETONES UR STRIP.AUTO-MCNC: NEGATIVE MG/DL
LDLC SERPL CALC-MCNC: 40 MG/DL
LEUKOCYTE ESTERASE UR QL STRIP.AUTO: ABNORMAL
LYMPHOCYTES # BLD AUTO: 2.17 X10*3/UL (ref 1.2–4.8)
LYMPHOCYTES NFR BLD AUTO: 30 %
MCH RBC QN AUTO: 27.2 PG (ref 26–34)
MCHC RBC AUTO-ENTMCNC: 30.1 G/DL (ref 32–36)
MCV RBC AUTO: 91 FL (ref 80–100)
MONOCYTES # BLD AUTO: 0.99 X10*3/UL (ref 0.1–1)
MONOCYTES NFR BLD AUTO: 13.7 %
NEUTROPHILS # BLD AUTO: 3.68 X10*3/UL (ref 1.2–7.7)
NEUTROPHILS NFR BLD AUTO: 50.8 %
NITRITE UR QL STRIP.AUTO: POSITIVE
NON HDL CHOLESTEROL: 57 MG/DL (ref 0–149)
NRBC BLD-RTO: 0 /100 WBCS (ref 0–0)
PH UR STRIP.AUTO: 5 [PH]
PLATELET # BLD AUTO: 263 X10*3/UL (ref 150–450)
POTASSIUM SERPL-SCNC: 4.5 MMOL/L (ref 3.5–5.3)
PROT SERPL-MCNC: 7.2 G/DL (ref 6.4–8.2)
PROT UR STRIP.AUTO-MCNC: ABNORMAL MG/DL
RBC # BLD AUTO: 4.63 X10*6/UL (ref 4–5.2)
RBC # UR STRIP.AUTO: NEGATIVE /UL
RBC #/AREA URNS AUTO: ABNORMAL /HPF
SODIUM SERPL-SCNC: 137 MMOL/L (ref 136–145)
SP GR UR STRIP.AUTO: 1.02
SQUAMOUS #/AREA URNS AUTO: ABNORMAL /HPF
T4 FREE SERPL-MCNC: 0.86 NG/DL (ref 0.61–1.12)
TRIGL SERPL-MCNC: 85 MG/DL (ref 0–149)
TSH SERPL-ACNC: 19.24 MIU/L (ref 0.44–3.98)
UROBILINOGEN UR STRIP.AUTO-MCNC: 2 MG/DL
VLDL: 17 MG/DL (ref 0–40)
WBC # BLD AUTO: 7.2 X10*3/UL (ref 4.4–11.3)
WBC #/AREA URNS AUTO: >50 /HPF
WBC CLUMPS #/AREA URNS AUTO: ABNORMAL /HPF

## 2023-11-30 PROCEDURE — 84443 ASSAY THYROID STIM HORMONE: CPT

## 2023-11-30 PROCEDURE — 83036 HEMOGLOBIN GLYCOSYLATED A1C: CPT

## 2023-11-30 PROCEDURE — 85025 COMPLETE CBC W/AUTO DIFF WBC: CPT

## 2023-11-30 PROCEDURE — 81001 URINALYSIS AUTO W/SCOPE: CPT

## 2023-11-30 PROCEDURE — 82306 VITAMIN D 25 HYDROXY: CPT

## 2023-11-30 PROCEDURE — 84439 ASSAY OF FREE THYROXINE: CPT

## 2023-11-30 PROCEDURE — 80061 LIPID PANEL: CPT

## 2023-11-30 PROCEDURE — 80053 COMPREHEN METABOLIC PANEL: CPT

## 2023-11-30 PROCEDURE — 36415 COLL VENOUS BLD VENIPUNCTURE: CPT

## 2023-12-05 ENCOUNTER — OFFICE VISIT (OUTPATIENT)
Dept: PRIMARY CARE | Facility: CLINIC | Age: 63
End: 2023-12-05
Payer: MEDICARE

## 2023-12-05 VITALS
OXYGEN SATURATION: 94 % | HEART RATE: 72 BPM | WEIGHT: 234 LBS | SYSTOLIC BLOOD PRESSURE: 116 MMHG | TEMPERATURE: 97.8 F | DIASTOLIC BLOOD PRESSURE: 84 MMHG | HEIGHT: 67 IN | BODY MASS INDEX: 36.73 KG/M2

## 2023-12-05 DIAGNOSIS — E03.9 HYPOTHYROIDISM, UNSPECIFIED TYPE: ICD-10-CM

## 2023-12-05 DIAGNOSIS — E11.65 TYPE 2 DIABETES MELLITUS WITH HYPERGLYCEMIA, WITH LONG-TERM CURRENT USE OF INSULIN (MULTI): Primary | ICD-10-CM

## 2023-12-05 DIAGNOSIS — Z12.31 SCREENING MAMMOGRAM FOR BREAST CANCER: ICD-10-CM

## 2023-12-05 DIAGNOSIS — Z79.4 TYPE 2 DIABETES MELLITUS WITH HYPERGLYCEMIA, WITH LONG-TERM CURRENT USE OF INSULIN (MULTI): Primary | ICD-10-CM

## 2023-12-05 DIAGNOSIS — E55.9 VITAMIN D DEFICIENCY: ICD-10-CM

## 2023-12-05 PROCEDURE — 1036F TOBACCO NON-USER: CPT | Performed by: INTERNAL MEDICINE

## 2023-12-05 PROCEDURE — 3051F HG A1C>EQUAL 7.0%<8.0%: CPT | Performed by: INTERNAL MEDICINE

## 2023-12-05 PROCEDURE — 3074F SYST BP LT 130 MM HG: CPT | Performed by: INTERNAL MEDICINE

## 2023-12-05 PROCEDURE — 1159F MED LIST DOCD IN RCRD: CPT | Performed by: INTERNAL MEDICINE

## 2023-12-05 PROCEDURE — 3008F BODY MASS INDEX DOCD: CPT | Performed by: INTERNAL MEDICINE

## 2023-12-05 PROCEDURE — 99213 OFFICE O/P EST LOW 20 MIN: CPT | Performed by: INTERNAL MEDICINE

## 2023-12-05 PROCEDURE — 4010F ACE/ARB THERAPY RXD/TAKEN: CPT | Performed by: INTERNAL MEDICINE

## 2023-12-05 PROCEDURE — 3079F DIAST BP 80-89 MM HG: CPT | Performed by: INTERNAL MEDICINE

## 2023-12-05 PROCEDURE — 3048F LDL-C <100 MG/DL: CPT | Performed by: INTERNAL MEDICINE

## 2023-12-05 PROCEDURE — 3066F NEPHROPATHY DOC TX: CPT | Performed by: INTERNAL MEDICINE

## 2023-12-05 RX ORDER — LEVOTHYROXINE SODIUM 137 UG/1
137 TABLET ORAL DAILY
Qty: 90 TABLET | Refills: 1 | Status: SHIPPED | OUTPATIENT
Start: 2023-12-05 | End: 2024-12-04

## 2023-12-05 RX ORDER — ERGOCALCIFEROL 1.25 MG/1
50000 CAPSULE ORAL
Qty: 12 CAPSULE | Refills: 3 | Status: SHIPPED | OUTPATIENT
Start: 2023-12-05 | End: 2024-12-04

## 2023-12-05 RX ORDER — METFORMIN HYDROCHLORIDE 1000 MG/1
1000 TABLET ORAL
Qty: 180 TABLET | Refills: 3 | Status: SHIPPED | OUTPATIENT
Start: 2023-12-05 | End: 2024-12-04

## 2023-12-05 ASSESSMENT — PATIENT HEALTH QUESTIONNAIRE - PHQ9
1. LITTLE INTEREST OR PLEASURE IN DOING THINGS: NOT AT ALL
2. FEELING DOWN, DEPRESSED OR HOPELESS: NOT AT ALL
SUM OF ALL RESPONSES TO PHQ9 QUESTIONS 1 AND 2: 0

## 2023-12-05 NOTE — PROGRESS NOTES
"CHIEF COMPLAINT:    Daphney Chen is a 63 y.o. female who presents for Med Management (Patient in office today for med management. ).    HISTORY OF PRESENT ILLNESS:  Daphney Chen  is a pleasant 63-year-old female comes here for routine checkup.  She suffers from diabetes and hypothyroidism.  She has multiple other medical comorbidities.  Her medical complaints are stable at this time.  She needs her medications refilled.  Patient is also due for annual mammogram.  Today she does not have any acute medical complaint.        Review of Systems   Constitutional:  Negative for activity change and fever.   HENT:  Negative for hearing loss, nosebleeds and tinnitus.    Eyes:  Negative for redness.   Respiratory:  Negative for chest tightness and stridor.    Cardiovascular:  Negative for chest pain, palpitations and leg swelling.   Gastrointestinal:  Negative for blood in stool.   Endocrine: Negative for cold intolerance.   Genitourinary:  Negative for hematuria.   Musculoskeletal:  Negative for joint swelling.   Skin:  Negative for rash.   Neurological:  Negative for speech difficulty and light-headedness.   Psychiatric/Behavioral:  Negative for behavioral problems.      Visit Vitals  /84 (BP Location: Left arm, Patient Position: Sitting)   Pulse 72   Temp 36.6 °C (97.8 °F) (Temporal)   Ht 1.702 m (5' 7\")   Wt 106 kg (234 lb)   SpO2 94%   BMI 36.65 kg/m²   Smoking Status Former   BSA 2.24 m²         Wt Readings from Last 10 Encounters:   12/05/23 106 kg (234 lb)   11/07/23 106 kg (232 lb 12.8 oz)   10/03/23 105 kg (231 lb)   09/05/23 103 kg (227 lb)   08/15/23 102 kg (225 lb)   07/20/23 105 kg (232 lb)   07/13/23 105 kg (232 lb)   07/13/23 106 kg (233 lb)   07/12/23 106 kg (233 lb 3 oz)   07/07/23 54 kg (119 lb)       Physical Exam  Constitutional:       General: She is not in acute distress.     Appearance: Normal appearance.   HENT:      Head: Normocephalic.      Right Ear: Tympanic membrane normal.      Left " Ear: Tympanic membrane normal.      Mouth/Throat:      Mouth: Mucous membranes are moist.   Cardiovascular:      Rate and Rhythm: Normal rate and regular rhythm.      Heart sounds: No murmur heard.  Pulmonary:      Effort: No respiratory distress.   Abdominal:      Palpations: Abdomen is soft.   Musculoskeletal:      Cervical back: Neck supple.      Right lower leg: No edema.      Left lower leg: No edema.   Skin:     Findings: No rash.   Neurological:      General: No focal deficit present.      Mental Status: She is alert and oriented to person, place, and time.   Psychiatric:         Mood and Affect: Mood normal.        RECENT LABS:  Lab Results   Component Value Date    WBC 7.2 11/30/2023    HGB 12.6 11/30/2023    HCT 41.9 11/30/2023     11/30/2023    CHOL 116 11/30/2023    TRIG 85 11/30/2023    HDL 58.6 11/30/2023    ALT 22 11/30/2023    AST 29 11/30/2023     11/30/2023    K 4.5 11/30/2023     11/30/2023    CREATININE 0.91 11/30/2023    BUN 14 11/30/2023    CO2 30 11/30/2023    TSH 19.24 (H) 11/30/2023    INR 1.2 (H) 06/24/2023    HGBA1C 7.8 (H) 11/30/2023     IMAGING:  Reviewed:   MEDICATIONS:   Current Outpatient Medications   Medication Instructions    albuterol 90 mcg/actuation inhaler 2 puffs, inhalation, Every 4 hours    aspirin 81 mg chewable tablet 1 tablet, oral, Daily    atorvastatin (LIPITOR) 80 mg, oral, Nightly    ergocalciferol (VITAMIN D-2) 50,000 Units, oral, Weekly    gabapentin (NEURONTIN) 400 mg, oral, 3 times daily    levothyroxine (SYNTHROID, LEVOXYL) 137 mcg, oral, Daily    losartan (COZAAR) 50 mg, oral, Daily    melatonin 10 mg tablet 1 tablet, oral, Nightly    metFORMIN (GLUCOPHAGE) 1,000 mg, oral, 2 times daily with meals    metoprolol succinate XL (TOPROL-XL) 25 mg, oral, Daily    naloxone (NARCAN) 4 mg, nasal, As needed    nitroglycerin (NITROSTAT) 0.4 mg, sublingual, Every 5 min PRN    OneTouch Delica Plus Lancet 33 gauge misc Daily, AS DIRECTED    OneTouch Verio  test strips strip Daily    oxyCODONE (ROXICODONE) 15 mg, oral, Every 6 hours PRN    potassium chloride CR 20 mEq ER tablet TAKE 1 TABLET(20 MEQ) BY MOUTH EVERY DAY. DO NOT CRUSH OR CHEW    rivaroxaban (XARELTO) 2.5 mg, oral, 2 times daily    Spiriva Respimat 2.5 mcg/actuation inhaler 2 puffs, inhalation, Daily    topiramate (Topamax) 25 mg tablet     torsemide (DEMADEX) 40 mg, oral, Daily      TODAY'S VISIT  DX:   1. Type 2 diabetes mellitus with hyperglycemia, with long-term current use of insulin (CMS/[facility])  metFORMIN (Glucophage) 1,000 mg tablet      2. Vitamin D deficiency  ergocalciferol (Vitamin D-2) 1.25 MG (07692 UT) capsule      3. Screening mammogram for breast cancer  BI mammo bilateral screening tomosynthesis      4. Hypothyroidism, unspecified type  levothyroxine (Synthroid, Levoxyl) 137 mcg tablet         MEDICAL DECISION MAKING:  - Recent lab work and relevant imaging studies have been reviewed.    - The current active medical co morbidities have been considered.   - Relevant correspondence/notes from specialty consultants were reviewed and discussed with patient.    - Patient was given treatment as per above plan.   - Patient will continue with current medical therapy and plan.   - Medication have been sent for refill.    - Next Follow up in 3 months..

## 2023-12-11 ENCOUNTER — ANCILLARY PROCEDURE (OUTPATIENT)
Dept: RADIOLOGY | Facility: CLINIC | Age: 63
End: 2023-12-11
Payer: MEDICARE

## 2023-12-11 DIAGNOSIS — Z12.31 SCREENING MAMMOGRAM FOR BREAST CANCER: ICD-10-CM

## 2023-12-11 PROCEDURE — 77067 SCR MAMMO BI INCL CAD: CPT | Performed by: RADIOLOGY

## 2023-12-11 PROCEDURE — 77063 BREAST TOMOSYNTHESIS BI: CPT | Performed by: RADIOLOGY

## 2023-12-11 PROCEDURE — 77067 SCR MAMMO BI INCL CAD: CPT

## 2023-12-20 DIAGNOSIS — R92.8 ABNORMAL MAMMOGRAM: Primary | ICD-10-CM

## 2023-12-28 DIAGNOSIS — R92.8 ABNORMAL MAMMOGRAM: ICD-10-CM

## 2023-12-31 ASSESSMENT — ENCOUNTER SYMPTOMS
CHEST TIGHTNESS: 0
ACTIVITY CHANGE: 0
SPEECH DIFFICULTY: 0
JOINT SWELLING: 0
PALPITATIONS: 0
LIGHT-HEADEDNESS: 0
STRIDOR: 0
BLOOD IN STOOL: 0
HEMATURIA: 0
FEVER: 0
EYE REDNESS: 0

## 2024-01-29 ENCOUNTER — HOSPITAL ENCOUNTER (OUTPATIENT)
Dept: RADIOLOGY | Facility: HOSPITAL | Age: 64
Discharge: HOME | End: 2024-01-29
Payer: MEDICARE

## 2024-01-29 DIAGNOSIS — R92.8 ABNORMAL MAMMOGRAM: ICD-10-CM

## 2024-01-29 PROCEDURE — 76642 ULTRASOUND BREAST LIMITED: CPT | Mod: RIGHT SIDE | Performed by: RADIOLOGY

## 2024-01-29 PROCEDURE — 76642 ULTRASOUND BREAST LIMITED: CPT | Mod: RT

## 2024-01-30 ENCOUNTER — DOCUMENTATION (OUTPATIENT)
Dept: SURGERY | Facility: HOSPITAL | Age: 64
End: 2024-01-30

## 2024-01-30 ENCOUNTER — OFFICE VISIT (OUTPATIENT)
Dept: PRIMARY CARE | Facility: CLINIC | Age: 64
End: 2024-01-30
Payer: MEDICARE

## 2024-01-30 VITALS
WEIGHT: 232 LBS | DIASTOLIC BLOOD PRESSURE: 68 MMHG | TEMPERATURE: 97.8 F | SYSTOLIC BLOOD PRESSURE: 118 MMHG | HEART RATE: 75 BPM | OXYGEN SATURATION: 93 % | HEIGHT: 67 IN | BODY MASS INDEX: 36.41 KG/M2

## 2024-01-30 DIAGNOSIS — I74.09 OTHER ARTERIAL EMBOLISM AND THROMBOSIS OF ABDOMINAL AORTA (MULTI): ICD-10-CM

## 2024-01-30 DIAGNOSIS — R93.89 ABNORMAL FINDING OF DIAGNOSTIC IMAGING: Primary | ICD-10-CM

## 2024-01-30 DIAGNOSIS — E66.01 OBESITY, MORBID (MULTI): ICD-10-CM

## 2024-01-30 DIAGNOSIS — I25.119 CHEST PAIN DUE TO CAD (CMS-HCC): ICD-10-CM

## 2024-01-30 DIAGNOSIS — J96.01 ACUTE RESPIRATORY FAILURE WITH HYPOXIA (MULTI): ICD-10-CM

## 2024-01-30 DIAGNOSIS — D65 DISSEMINATED INTRAVASCULAR COAGULATION (MULTI): ICD-10-CM

## 2024-01-30 DIAGNOSIS — I50.33 ACUTE ON CHRONIC DIASTOLIC (CONGESTIVE) HEART FAILURE (MULTI): ICD-10-CM

## 2024-01-30 PROCEDURE — 4010F ACE/ARB THERAPY RXD/TAKEN: CPT | Performed by: INTERNAL MEDICINE

## 2024-01-30 PROCEDURE — 99214 OFFICE O/P EST MOD 30 MIN: CPT | Performed by: INTERNAL MEDICINE

## 2024-01-30 PROCEDURE — 3008F BODY MASS INDEX DOCD: CPT | Performed by: INTERNAL MEDICINE

## 2024-01-30 PROCEDURE — 1159F MED LIST DOCD IN RCRD: CPT | Performed by: INTERNAL MEDICINE

## 2024-01-30 PROCEDURE — 3074F SYST BP LT 130 MM HG: CPT | Performed by: INTERNAL MEDICINE

## 2024-01-30 PROCEDURE — 3078F DIAST BP <80 MM HG: CPT | Performed by: INTERNAL MEDICINE

## 2024-01-30 PROCEDURE — 1036F TOBACCO NON-USER: CPT | Performed by: INTERNAL MEDICINE

## 2024-01-30 PROCEDURE — 1125F AMNT PAIN NOTED PAIN PRSNT: CPT | Performed by: INTERNAL MEDICINE

## 2024-01-30 ASSESSMENT — PATIENT HEALTH QUESTIONNAIRE - PHQ9
SUM OF ALL RESPONSES TO PHQ9 QUESTIONS 1 AND 2: 0
1. LITTLE INTEREST OR PLEASURE IN DOING THINGS: NOT AT ALL
2. FEELING DOWN, DEPRESSED OR HOPELESS: NOT AT ALL

## 2024-01-30 NOTE — PROGRESS NOTES
Breast navigator:    I met with the patient regarding her abnormal mammogram and radiologist recommendations for breast biopsy. Patient was given handouts and questions addressed. She has elected Dr. Pereira as her surgeon. Appointment and biopsy scheduled. Dr. Baxter notified. Verified with Dr. Baxter that he wants patient to hold her Aspirin and Xarelto for 2 days prior to procedure. Patient aware.

## 2024-01-31 ENCOUNTER — OFFICE VISIT (OUTPATIENT)
Dept: SURGERY | Facility: HOSPITAL | Age: 64
End: 2024-01-31
Payer: MEDICARE

## 2024-01-31 VITALS
BODY MASS INDEX: 36.41 KG/M2 | SYSTOLIC BLOOD PRESSURE: 85 MMHG | HEART RATE: 79 BPM | DIASTOLIC BLOOD PRESSURE: 49 MMHG | WEIGHT: 232 LBS | HEIGHT: 67 IN

## 2024-01-31 DIAGNOSIS — N63.41 SUBAREOLAR MASS OF RIGHT BREAST: Primary | ICD-10-CM

## 2024-01-31 PROCEDURE — 3074F SYST BP LT 130 MM HG: CPT | Performed by: SURGERY

## 2024-01-31 PROCEDURE — 3078F DIAST BP <80 MM HG: CPT | Performed by: SURGERY

## 2024-01-31 PROCEDURE — 1159F MED LIST DOCD IN RCRD: CPT | Performed by: SURGERY

## 2024-01-31 PROCEDURE — 1036F TOBACCO NON-USER: CPT | Performed by: SURGERY

## 2024-01-31 PROCEDURE — 3008F BODY MASS INDEX DOCD: CPT | Performed by: SURGERY

## 2024-01-31 PROCEDURE — 4010F ACE/ARB THERAPY RXD/TAKEN: CPT | Performed by: SURGERY

## 2024-01-31 PROCEDURE — 99214 OFFICE O/P EST MOD 30 MIN: CPT | Performed by: SURGERY

## 2024-01-31 PROCEDURE — 99204 OFFICE O/P NEW MOD 45 MIN: CPT | Performed by: SURGERY

## 2024-01-31 PROCEDURE — 3066F NEPHROPATHY DOC TX: CPT | Performed by: SURGERY

## 2024-01-31 ASSESSMENT — ENCOUNTER SYMPTOMS
EYES NEGATIVE: 1
MUSCULOSKELETAL NEGATIVE: 1
RESPIRATORY NEGATIVE: 1
NEUROLOGICAL NEGATIVE: 1
CONSTITUTIONAL NEGATIVE: 1
ALLERGIC/IMMUNOLOGIC NEGATIVE: 1
PSYCHIATRIC NEGATIVE: 1
CARDIOVASCULAR NEGATIVE: 1
HEMATOLOGIC/LYMPHATIC NEGATIVE: 1
GASTROINTESTINAL NEGATIVE: 1
ENDOCRINE NEGATIVE: 1

## 2024-01-31 NOTE — PROGRESS NOTES
Subjective   Patient ID: Daphney Chen is a 64 y.o. female who presents for abnormal mammogram.   HPI  Pt presents for right breast abnormal imaging and mass.  Patient has no breast complaints.  Has not noticed a mass.  No tenderness.  No skin changes or nipple discharge.    Imagin23 screening mammogram followed by RIGHT breast US 24: 2oclock 2CFN ovoid mass 1.5x0.6x1.1cm no shadowing, nonspecific ?fibroadenoma. Normal axilla. Category 4.     Xarelto for PVD (right ax-bifem bypass 2023) and CAD (ALAN ). Cleared to come off for biopsy.     Breast history:  Open left breast biopsy remotely for benign indications  No family history  G4, P4, no breast-feeding.  First pregnancy age 16  Menarche age 11.  Menopause age 48.  No hormone supplementation    Review of Systems   Constitutional: Negative.    HENT: Negative.     Eyes: Negative.    Respiratory: Negative.     Cardiovascular: Negative.    Gastrointestinal: Negative.    Endocrine: Negative.    Genitourinary: Negative.    Musculoskeletal: Negative.    Skin: Negative.    Allergic/Immunologic: Negative.    Neurological: Negative.    Hematological: Negative.    Psychiatric/Behavioral: Negative.         No past medical history on file.  Past Surgical History:   Procedure Laterality Date    CT AORTA AND BILATERAL ILIOFEMORAL RUNOFF ANGIOGRAM W AND/OR WO IV CONTRAST  2023    CT AORTA AND BILATERAL ILIOFEMORAL RUNOFF ANGIOGRAM W AND/OR WO IV CONTRAST 2023 [facility] CT     Family History   Problem Relation Name Age of Onset    Diabetes Mother      Hypertension Mother      Heart attack Mother      Heart attack Daughter        Social History     Socioeconomic History    Marital status:      Spouse name: Not on file    Number of children: Not on file    Years of education: Not on file    Highest education level: Not on file   Occupational History    Not on file   Tobacco Use    Smoking status: Former     Types: Cigarettes     Quit date:       Years since quittin.0    Smokeless tobacco: Never   Vaping Use    Vaping Use: Never used   Substance and Sexual Activity    Alcohol use: Not Currently    Drug use: Never    Sexual activity: Not on file   Other Topics Concern    Not on file   Social History Narrative    Not on file     Social Determinants of Health     Financial Resource Strain: Not on file   Food Insecurity: Not on file   Transportation Needs: Not on file   Physical Activity: Not on file   Stress: Not on file   Social Connections: Not on file   Intimate Partner Violence: Not on file   Housing Stability: Not on file          Current Outpatient Medications:     albuterol 90 mcg/actuation inhaler, Inhale 2 puffs every 4 hours., Disp: , Rfl:     aspirin 81 mg chewable tablet, Chew 1 tablet (81 mg) once daily., Disp: , Rfl:     atorvastatin (Lipitor) 80 mg tablet, Take 1 tablet (80 mg) by mouth once daily at bedtime., Disp: , Rfl:     ergocalciferol (Vitamin D-2) 1.25 MG (34050 UT) capsule, Take 1 capsule (50,000 Units) by mouth 1 (one) time per week., Disp: 12 capsule, Rfl: 3    gabapentin (Neurontin) 400 mg capsule, Take 1 capsule (400 mg) by mouth 3 times a day., Disp: , Rfl:     levothyroxine (Synthroid, Levoxyl) 137 mcg tablet, Take 1 tablet (137 mcg) by mouth once daily., Disp: 90 tablet, Rfl: 1    losartan (Cozaar) 50 mg tablet, Take 1 tablet (50 mg) by mouth once daily., Disp: , Rfl:     melatonin 10 mg tablet, Take 1 tablet (10 mg) by mouth once daily at bedtime., Disp: , Rfl:     metFORMIN (Glucophage) 1,000 mg tablet, Take 1 tablet (1,000 mg) by mouth 2 times a day with meals., Disp: 180 tablet, Rfl: 3    metoprolol succinate XL (Toprol-XL) 25 mg 24 hr tablet, Take 1 tablet (25 mg) by mouth once daily., Disp: , Rfl:     naloxone (Narcan) 4 mg/0.1 mL nasal spray, Administer 1 spray (4 mg) into affected nostril(s) if needed for opioid reversal., Disp: , Rfl:     nitroglycerin (Nitrostat) 0.4 mg SL tablet, Place 1 tablet (0.4 mg) under the  tongue every 5 minutes if needed for chest pain., Disp: , Rfl:     OneTouch Delica Plus Lancet 33 gauge misc, once daily. AS DIRECTED, Disp: , Rfl:     OneTouch Verio test strips strip, once daily., Disp: , Rfl:     oxyCODONE (Roxicodone) 15 mg immediate release tablet, Take 1 tablet (15 mg) by mouth every 6 hours if needed., Disp: , Rfl:     potassium chloride CR 20 mEq ER tablet, TAKE 1 TABLET(20 MEQ) BY MOUTH EVERY DAY. DO NOT CRUSH OR CHEW, Disp: 90 tablet, Rfl: 3    rivaroxaban (Xarelto) 2.5 mg tablet, Take 1 tablet (2.5 mg) by mouth 2 times a day., Disp: , Rfl:     Spiriva Respimat 2.5 mcg/actuation inhaler, Inhale 2 puffs once daily., Disp: , Rfl:     topiramate (Topamax) 25 mg tablet, , Disp: , Rfl:     torsemide (Demadex) 20 mg tablet, TAKE 2 TABLETS BY MOUTH EVERY DAY, Disp: 180 tablet, Rfl: 3     Objective   There were no vitals filed for this visit.   Physical Exam  Constitutional:       General: She is not in acute distress.     Appearance: Normal appearance.   HENT:      Head: Normocephalic and atraumatic.      Mouth/Throat:      Mouth: Mucous membranes are moist.   Eyes:      Pupils: Pupils are equal, round, and reactive to light.   Cardiovascular:      Rate and Rhythm: Normal rate and regular rhythm.   Pulmonary:      Effort: Pulmonary effort is normal.   Chest:      Chest wall: No mass, swelling or tenderness.   Breasts:     Right: Normal. No mass or tenderness.      Left: Normal. No mass or tenderness.      Comments: Mild bilateral symmetric nipple inversion  Right deltopectoral groove incision for axillary bypass procedure  Abdominal:      General: Abdomen is flat.      Palpations: Abdomen is soft.   Musculoskeletal:         General: Normal range of motion.      Cervical back: Normal range of motion.   Lymphadenopathy:      Cervical: No cervical adenopathy.      Upper Body:      Right upper body: No axillary adenopathy.      Left upper body: No axillary adenopathy.   Skin:     General: Skin is  warm and dry.   Neurological:      Mental Status: She is alert. Mental status is at baseline.   Psychiatric:         Mood and Affect: Mood normal.         Assessment/Plan   Problem List Items Addressed This Visit    None  Visit Diagnoses         Codes    Subareolar mass of right breast    -  Primary N63.41          US guided biopsy with clearance to hold blood thinners, with return to clinic for discussion re pathology and further recommendations.    Juana Pereira MD

## 2024-02-06 ENCOUNTER — HOSPITAL ENCOUNTER (OUTPATIENT)
Dept: RADIOLOGY | Facility: HOSPITAL | Age: 64
Discharge: HOME | DRG: 314 | End: 2024-02-06
Payer: MEDICARE

## 2024-02-06 VITALS
DIASTOLIC BLOOD PRESSURE: 65 MMHG | RESPIRATION RATE: 18 BRPM | OXYGEN SATURATION: 93 % | HEART RATE: 68 BPM | SYSTOLIC BLOOD PRESSURE: 95 MMHG

## 2024-02-06 DIAGNOSIS — R92.8 ABNORMAL MAMMOGRAM: ICD-10-CM

## 2024-02-06 PROCEDURE — 77065 DX MAMMO INCL CAD UNI: CPT | Mod: RIGHT SIDE | Performed by: RADIOLOGY

## 2024-02-06 PROCEDURE — A4648 IMPLANTABLE TISSUE MARKER: HCPCS

## 2024-02-06 PROCEDURE — 19083 BX BREAST 1ST LESION US IMAG: CPT | Mod: RIGHT SIDE | Performed by: RADIOLOGY

## 2024-02-06 PROCEDURE — 77065 DX MAMMO INCL CAD UNI: CPT

## 2024-02-06 PROCEDURE — 88305 TISSUE EXAM BY PATHOLOGIST: CPT | Mod: TC | Performed by: SURGERY

## 2024-02-06 PROCEDURE — 2500000005 HC RX 250 GENERAL PHARMACY W/O HCPCS: Performed by: RADIOLOGY

## 2024-02-06 PROCEDURE — 19083 BX BREAST 1ST LESION US IMAG: CPT | Mod: RT

## 2024-02-06 PROCEDURE — 2720000007 HC OR 272 NO HCPCS

## 2024-02-06 PROCEDURE — 88305 TISSUE EXAM BY PATHOLOGIST: CPT | Performed by: SPECIALIST

## 2024-02-06 RX ORDER — LIDOCAINE HYDROCHLORIDE 20 MG/ML
INJECTION, SOLUTION EPIDURAL; INFILTRATION; INTRACAUDAL; PERINEURAL AS NEEDED
Status: COMPLETED | OUTPATIENT
Start: 2024-02-06 | End: 2024-02-06

## 2024-02-06 RX ADMIN — LIDOCAINE HYDROCHLORIDE 5 ML: 20 INJECTION, SOLUTION EPIDURAL; INFILTRATION; INTRACAUDAL; PERINEURAL at 13:34

## 2024-02-06 ASSESSMENT — PAIN SCALES - GENERAL
PAINLEVEL_OUTOF10: 0 - NO PAIN
PAINLEVEL_OUTOF10: 0 - NO PAIN

## 2024-02-06 ASSESSMENT — PAIN - FUNCTIONAL ASSESSMENT
PAIN_FUNCTIONAL_ASSESSMENT: 0-10
PAIN_FUNCTIONAL_ASSESSMENT: 0-10

## 2024-02-06 NOTE — DISCHARGE INSTRUCTIONS
ACTIVITY  You may return to work or other activities the day of your biopsy, providing that these activities do not include heavy lifting or strenuous athletic sports.  Do not shower or bathe for the next 24 hours.  You may take a sponge bath, providing you take care to keep the biopsy area dry.  You may return to all of your normal activities after 48 hours as tolerated.  MEDICATIONS  Avoid aspirin for the next 24 hours. Use Tylenol if needed for discomfort over the next 48 hours.   Continue taking your normal medications as usual, except as noted below.  DRESSINGS  Keep the gauze dressing and your bra on day and night for the next 24 hours.  Remove the dressing 24 hours after the biopsy. You may bathe or shower at that time. The steri strips may come off when wet. Use a band aid if needed. Steri strips should be on no longer than 5 days.  Use ice pack if desired: 1st 24 hours- on 15 minutes, off 15 minutes.  OTHER INSTRUCTIONS  Watch for excessive bleeding, swelling, redness, pain or fever. If any of these occur, contact [facility] at [phone] between 8am and 5pm, or the Radiologists answering service at [phone] after 5pm. The Mammography department may be reached at [phone] Monday - Friday 7:30 am - 3:30 pm.  **For several days or even a couple of weeks, you may feel mild tenderness, “twinges”, or a small bump at the biopsy puncture site. This is normal. Applying moist heat to the area after 2 days may bring relief. This will disappear with time.

## 2024-02-07 ENCOUNTER — APPOINTMENT (OUTPATIENT)
Dept: CARDIOLOGY | Facility: HOSPITAL | Age: 64
DRG: 314 | End: 2024-02-07
Payer: MEDICARE

## 2024-02-07 ENCOUNTER — APPOINTMENT (OUTPATIENT)
Dept: RADIOLOGY | Facility: HOSPITAL | Age: 64
DRG: 314 | End: 2024-02-07
Payer: MEDICARE

## 2024-02-07 ENCOUNTER — HOSPITAL ENCOUNTER (INPATIENT)
Facility: HOSPITAL | Age: 64
LOS: 6 days | Discharge: HOME | DRG: 314 | End: 2024-02-13
Attending: EMERGENCY MEDICINE | Admitting: FAMILY MEDICINE
Payer: MEDICARE

## 2024-02-07 DIAGNOSIS — J44.1 COPD EXACERBATION (MULTI): ICD-10-CM

## 2024-02-07 DIAGNOSIS — I95.9 HYPOTENSION, UNSPECIFIED HYPOTENSION TYPE: Primary | ICD-10-CM

## 2024-02-07 DIAGNOSIS — E11.9 TYPE 2 DIABETES MELLITUS WITHOUT COMPLICATION, WITH LONG-TERM CURRENT USE OF INSULIN (MULTI): ICD-10-CM

## 2024-02-07 DIAGNOSIS — Z79.4 TYPE 2 DIABETES MELLITUS WITHOUT COMPLICATION, WITH LONG-TERM CURRENT USE OF INSULIN (MULTI): ICD-10-CM

## 2024-02-07 DIAGNOSIS — R09.02 HYPOXIA: ICD-10-CM

## 2024-02-07 DIAGNOSIS — N39.0 URINARY TRACT INFECTION WITHOUT HEMATURIA, SITE UNSPECIFIED: ICD-10-CM

## 2024-02-07 LAB
ALBUMIN SERPL BCP-MCNC: 3.3 G/DL (ref 3.4–5)
ALP SERPL-CCNC: 127 U/L (ref 33–136)
ALT SERPL W P-5'-P-CCNC: 35 U/L (ref 7–45)
ANION GAP SERPL CALC-SCNC: 13 MMOL/L (ref 10–20)
APPEARANCE UR: ABNORMAL
APTT PPP: 31 SECONDS (ref 27–38)
AST SERPL W P-5'-P-CCNC: 46 U/L (ref 9–39)
ATRIAL RATE: 86 BPM
BACTERIA #/AREA URNS AUTO: ABNORMAL /HPF
BASOPHILS # BLD AUTO: 0.07 X10*3/UL (ref 0–0.1)
BASOPHILS NFR BLD AUTO: 1.2 %
BILIRUB SERPL-MCNC: 0.9 MG/DL (ref 0–1.2)
BILIRUB UR STRIP.AUTO-MCNC: NEGATIVE MG/DL
BNP SERPL-MCNC: 43 PG/ML (ref 0–99)
BUN SERPL-MCNC: 9 MG/DL (ref 6–23)
CALCIUM SERPL-MCNC: 8.8 MG/DL (ref 8.6–10.3)
CARDIAC TROPONIN I PNL SERPL HS: 17 NG/L (ref 0–13)
CARDIAC TROPONIN I PNL SERPL HS: 17 NG/L (ref 0–13)
CHLORIDE SERPL-SCNC: 97 MMOL/L (ref 98–107)
CO2 SERPL-SCNC: 27 MMOL/L (ref 21–32)
COLOR UR: YELLOW
CREAT SERPL-MCNC: 0.94 MG/DL (ref 0.5–1.05)
D DIMER PPP FEU-MCNC: 1578 NG/ML FEU
EGFRCR SERPLBLD CKD-EPI 2021: 67 ML/MIN/1.73M*2
EOSINOPHIL # BLD AUTO: 0.08 X10*3/UL (ref 0–0.7)
EOSINOPHIL NFR BLD AUTO: 1.3 %
ERYTHROCYTE [DISTWIDTH] IN BLOOD BY AUTOMATED COUNT: 15.4 % (ref 11.5–14.5)
FLUAV RNA RESP QL NAA+PROBE: NOT DETECTED
FLUBV RNA RESP QL NAA+PROBE: NOT DETECTED
GLUCOSE BLD MANUAL STRIP-MCNC: 309 MG/DL (ref 74–99)
GLUCOSE BLD MANUAL STRIP-MCNC: 310 MG/DL (ref 74–99)
GLUCOSE SERPL-MCNC: 485 MG/DL (ref 74–99)
GLUCOSE UR STRIP.AUTO-MCNC: ABNORMAL MG/DL
HCT VFR BLD AUTO: 39.1 % (ref 36–46)
HGB BLD-MCNC: 12.3 G/DL (ref 12–16)
HOLD SPECIMEN: NORMAL
IMM GRANULOCYTES # BLD AUTO: 0.02 X10*3/UL (ref 0–0.7)
IMM GRANULOCYTES NFR BLD AUTO: 0.3 % (ref 0–0.9)
INR PPP: 1.1 (ref 0.9–1.1)
KETONES UR STRIP.AUTO-MCNC: NEGATIVE MG/DL
LACTATE SERPL-SCNC: 2.3 MMOL/L (ref 0.4–2)
LACTATE SERPL-SCNC: 3.6 MMOL/L (ref 0.4–2)
LEUKOCYTE ESTERASE UR QL STRIP.AUTO: ABNORMAL
LYMPHOCYTES # BLD AUTO: 1.09 X10*3/UL (ref 1.2–4.8)
LYMPHOCYTES NFR BLD AUTO: 18.3 %
MAGNESIUM SERPL-MCNC: 1.67 MG/DL (ref 1.6–2.4)
MCH RBC QN AUTO: 28.3 PG (ref 26–34)
MCHC RBC AUTO-ENTMCNC: 31.5 G/DL (ref 32–36)
MCV RBC AUTO: 90 FL (ref 80–100)
MONOCYTES # BLD AUTO: 0.64 X10*3/UL (ref 0.1–1)
MONOCYTES NFR BLD AUTO: 10.7 %
NEUTROPHILS # BLD AUTO: 4.06 X10*3/UL (ref 1.2–7.7)
NEUTROPHILS NFR BLD AUTO: 68.2 %
NITRITE UR QL STRIP.AUTO: POSITIVE
NRBC BLD-RTO: 0 /100 WBCS (ref 0–0)
P AXIS: 62 DEGREES
P OFFSET: 204 MS
P ONSET: 141 MS
PH UR STRIP.AUTO: 5 [PH]
PLATELET # BLD AUTO: 220 X10*3/UL (ref 150–450)
POTASSIUM SERPL-SCNC: 3.7 MMOL/L (ref 3.5–5.3)
PR INTERVAL: 144 MS
PROT SERPL-MCNC: 6.8 G/DL (ref 6.4–8.2)
PROT UR STRIP.AUTO-MCNC: NEGATIVE MG/DL
PROTHROMBIN TIME: 11.9 SECONDS (ref 9.8–12.8)
Q ONSET: 213 MS
QRS COUNT: 14 BEATS
QRS DURATION: 82 MS
QT INTERVAL: 370 MS
QTC CALCULATION(BAZETT): 442 MS
QTC FREDERICIA: 417 MS
R AXIS: -19 DEGREES
RBC # BLD AUTO: 4.34 X10*6/UL (ref 4–5.2)
RBC # UR STRIP.AUTO: NEGATIVE /UL
RBC #/AREA URNS AUTO: ABNORMAL /HPF
SARS-COV-2 RNA RESP QL NAA+PROBE: NOT DETECTED
SODIUM SERPL-SCNC: 133 MMOL/L (ref 136–145)
SP GR UR STRIP.AUTO: 1.03
SQUAMOUS #/AREA URNS AUTO: ABNORMAL /HPF
T AXIS: 95 DEGREES
T OFFSET: 398 MS
UROBILINOGEN UR STRIP.AUTO-MCNC: 2 MG/DL
VENTRICULAR RATE: 86 BPM
WBC # BLD AUTO: 6 X10*3/UL (ref 4.4–11.3)
WBC #/AREA URNS AUTO: ABNORMAL /HPF

## 2024-02-07 PROCEDURE — 84484 ASSAY OF TROPONIN QUANT: CPT | Performed by: EMERGENCY MEDICINE

## 2024-02-07 PROCEDURE — 2500000004 HC RX 250 GENERAL PHARMACY W/ HCPCS (ALT 636 FOR OP/ED): Performed by: EMERGENCY MEDICINE

## 2024-02-07 PROCEDURE — 2550000001 HC RX 255 CONTRASTS: Performed by: EMERGENCY MEDICINE

## 2024-02-07 PROCEDURE — 85610 PROTHROMBIN TIME: CPT | Performed by: EMERGENCY MEDICINE

## 2024-02-07 PROCEDURE — 82947 ASSAY GLUCOSE BLOOD QUANT: CPT

## 2024-02-07 PROCEDURE — 87040 BLOOD CULTURE FOR BACTERIA: CPT | Mod: ELYLAB | Performed by: EMERGENCY MEDICINE

## 2024-02-07 PROCEDURE — 71045 X-RAY EXAM CHEST 1 VIEW: CPT | Mod: FR

## 2024-02-07 PROCEDURE — 83880 ASSAY OF NATRIURETIC PEPTIDE: CPT | Performed by: EMERGENCY MEDICINE

## 2024-02-07 PROCEDURE — 96365 THER/PROPH/DIAG IV INF INIT: CPT

## 2024-02-07 PROCEDURE — 83605 ASSAY OF LACTIC ACID: CPT | Performed by: EMERGENCY MEDICINE

## 2024-02-07 PROCEDURE — 36415 COLL VENOUS BLD VENIPUNCTURE: CPT | Performed by: EMERGENCY MEDICINE

## 2024-02-07 PROCEDURE — 93005 ELECTROCARDIOGRAM TRACING: CPT

## 2024-02-07 PROCEDURE — 99291 CRITICAL CARE FIRST HOUR: CPT | Mod: 27,25 | Performed by: EMERGENCY MEDICINE

## 2024-02-07 PROCEDURE — 94640 AIRWAY INHALATION TREATMENT: CPT

## 2024-02-07 PROCEDURE — 80053 COMPREHEN METABOLIC PANEL: CPT | Performed by: EMERGENCY MEDICINE

## 2024-02-07 PROCEDURE — 85025 COMPLETE CBC W/AUTO DIFF WBC: CPT | Performed by: EMERGENCY MEDICINE

## 2024-02-07 PROCEDURE — 71045 X-RAY EXAM CHEST 1 VIEW: CPT | Mod: FOREIGN READ | Performed by: RADIOLOGY

## 2024-02-07 PROCEDURE — 81001 URINALYSIS AUTO W/SCOPE: CPT | Performed by: EMERGENCY MEDICINE

## 2024-02-07 PROCEDURE — 2500000005 HC RX 250 GENERAL PHARMACY W/O HCPCS: Performed by: EMERGENCY MEDICINE

## 2024-02-07 PROCEDURE — 71275 CT ANGIOGRAPHY CHEST: CPT | Mod: FR

## 2024-02-07 PROCEDURE — 85730 THROMBOPLASTIN TIME PARTIAL: CPT | Performed by: EMERGENCY MEDICINE

## 2024-02-07 PROCEDURE — 87086 URINE CULTURE/COLONY COUNT: CPT | Mod: ELYLAB | Performed by: EMERGENCY MEDICINE

## 2024-02-07 PROCEDURE — 83735 ASSAY OF MAGNESIUM: CPT | Performed by: EMERGENCY MEDICINE

## 2024-02-07 PROCEDURE — 2500000002 HC RX 250 W HCPCS SELF ADMINISTERED DRUGS (ALT 637 FOR MEDICARE OP, ALT 636 FOR OP/ED): Performed by: EMERGENCY MEDICINE

## 2024-02-07 PROCEDURE — 87636 SARSCOV2 & INF A&B AMP PRB: CPT | Performed by: EMERGENCY MEDICINE

## 2024-02-07 PROCEDURE — 85379 FIBRIN DEGRADATION QUANT: CPT | Performed by: EMERGENCY MEDICINE

## 2024-02-07 PROCEDURE — 96361 HYDRATE IV INFUSION ADD-ON: CPT

## 2024-02-07 PROCEDURE — 1210000001 HC SEMI-PRIVATE ROOM DAILY

## 2024-02-07 PROCEDURE — 71275 CT ANGIOGRAPHY CHEST: CPT | Mod: FOREIGN READ | Performed by: RADIOLOGY

## 2024-02-07 RX ORDER — FAMOTIDINE 20 MG/1
20 TABLET, FILM COATED ORAL 2 TIMES DAILY PRN
Status: DISCONTINUED | OUTPATIENT
Start: 2024-02-07 | End: 2024-02-13 | Stop reason: HOSPADM

## 2024-02-07 RX ORDER — TOPIRAMATE 25 MG/1
25 TABLET ORAL NIGHTLY
Status: DISCONTINUED | OUTPATIENT
Start: 2024-02-07 | End: 2024-02-13 | Stop reason: HOSPADM

## 2024-02-07 RX ORDER — OXYCODONE HYDROCHLORIDE 5 MG/1
15 TABLET ORAL EVERY 6 HOURS PRN
Status: DISCONTINUED | OUTPATIENT
Start: 2024-02-07 | End: 2024-02-13 | Stop reason: HOSPADM

## 2024-02-07 RX ORDER — ACETAMINOPHEN 650 MG/1
650 SUPPOSITORY RECTAL EVERY 4 HOURS PRN
Status: DISCONTINUED | OUTPATIENT
Start: 2024-02-07 | End: 2024-02-13 | Stop reason: HOSPADM

## 2024-02-07 RX ORDER — LOSARTAN POTASSIUM 50 MG/1
50 TABLET ORAL DAILY
Status: DISCONTINUED | OUTPATIENT
Start: 2024-02-08 | End: 2024-02-12

## 2024-02-07 RX ORDER — NAPROXEN SODIUM 220 MG/1
81 TABLET, FILM COATED ORAL DAILY
Status: DISCONTINUED | OUTPATIENT
Start: 2024-02-08 | End: 2024-02-13 | Stop reason: HOSPADM

## 2024-02-07 RX ORDER — ERGOCALCIFEROL 1.25 MG/1
50000 CAPSULE ORAL
Status: DISCONTINUED | OUTPATIENT
Start: 2024-02-08 | End: 2024-02-13 | Stop reason: HOSPADM

## 2024-02-07 RX ORDER — NITROGLYCERIN 0.4 MG/1
0.4 TABLET SUBLINGUAL EVERY 5 MIN PRN
Status: DISCONTINUED | OUTPATIENT
Start: 2024-02-07 | End: 2024-02-13 | Stop reason: HOSPADM

## 2024-02-07 RX ORDER — DEXTROSE 50 % IN WATER (D50W) INTRAVENOUS SYRINGE
25
Status: DISCONTINUED | OUTPATIENT
Start: 2024-02-07 | End: 2024-02-13 | Stop reason: HOSPADM

## 2024-02-07 RX ORDER — TALC
3 POWDER (GRAM) TOPICAL DAILY
Status: DISCONTINUED | OUTPATIENT
Start: 2024-02-07 | End: 2024-02-08

## 2024-02-07 RX ORDER — CEFTRIAXONE 1 G/50ML
1 INJECTION, SOLUTION INTRAVENOUS EVERY 24 HOURS
Status: DISCONTINUED | OUTPATIENT
Start: 2024-02-08 | End: 2024-02-10

## 2024-02-07 RX ORDER — ACETAMINOPHEN 325 MG/1
650 TABLET ORAL EVERY 4 HOURS PRN
Status: DISCONTINUED | OUTPATIENT
Start: 2024-02-07 | End: 2024-02-13 | Stop reason: HOSPADM

## 2024-02-07 RX ORDER — CEFTRIAXONE 1 G/50ML
1 INJECTION, SOLUTION INTRAVENOUS ONCE
Status: COMPLETED | OUTPATIENT
Start: 2024-02-07 | End: 2024-02-07

## 2024-02-07 RX ORDER — INSULIN LISPRO 100 [IU]/ML
0-5 INJECTION, SOLUTION INTRAVENOUS; SUBCUTANEOUS
Status: DISCONTINUED | OUTPATIENT
Start: 2024-02-08 | End: 2024-02-08

## 2024-02-07 RX ORDER — ACETAMINOPHEN 160 MG/5ML
650 SOLUTION ORAL EVERY 4 HOURS PRN
Status: DISCONTINUED | OUTPATIENT
Start: 2024-02-07 | End: 2024-02-13 | Stop reason: HOSPADM

## 2024-02-07 RX ORDER — IPRATROPIUM BROMIDE AND ALBUTEROL SULFATE 2.5; .5 MG/3ML; MG/3ML
3 SOLUTION RESPIRATORY (INHALATION)
Status: DISCONTINUED | OUTPATIENT
Start: 2024-02-08 | End: 2024-02-09

## 2024-02-07 RX ORDER — FAMOTIDINE 10 MG/ML
20 INJECTION INTRAVENOUS 2 TIMES DAILY PRN
Status: DISCONTINUED | OUTPATIENT
Start: 2024-02-07 | End: 2024-02-13 | Stop reason: HOSPADM

## 2024-02-07 RX ORDER — GABAPENTIN 400 MG/1
400 CAPSULE ORAL 3 TIMES DAILY
Status: DISCONTINUED | OUTPATIENT
Start: 2024-02-07 | End: 2024-02-13 | Stop reason: HOSPADM

## 2024-02-07 RX ORDER — ACETAMINOPHEN 500 MG
10 TABLET ORAL NIGHTLY
Status: DISCONTINUED | OUTPATIENT
Start: 2024-02-07 | End: 2024-02-13 | Stop reason: HOSPADM

## 2024-02-07 RX ORDER — POLYETHYLENE GLYCOL 3350 17 G/17G
17 POWDER, FOR SOLUTION ORAL DAILY
Status: DISCONTINUED | OUTPATIENT
Start: 2024-02-08 | End: 2024-02-13 | Stop reason: HOSPADM

## 2024-02-07 RX ORDER — SODIUM CHLORIDE 9 MG/ML
75 INJECTION, SOLUTION INTRAVENOUS CONTINUOUS
Status: ACTIVE | OUTPATIENT
Start: 2024-02-07 | End: 2024-02-09

## 2024-02-07 RX ORDER — NALOXONE HYDROCHLORIDE 4 MG/.1ML
4 SPRAY NASAL AS NEEDED
Status: DISCONTINUED | OUTPATIENT
Start: 2024-02-07 | End: 2024-02-08

## 2024-02-07 RX ORDER — ONDANSETRON 4 MG/1
4 TABLET, FILM COATED ORAL EVERY 8 HOURS PRN
Status: DISCONTINUED | OUTPATIENT
Start: 2024-02-07 | End: 2024-02-13 | Stop reason: HOSPADM

## 2024-02-07 RX ORDER — ALBUTEROL SULFATE 0.83 MG/ML
2.5 SOLUTION RESPIRATORY (INHALATION) ONCE
Status: COMPLETED | OUTPATIENT
Start: 2024-02-07 | End: 2024-02-07

## 2024-02-07 RX ORDER — IPRATROPIUM BROMIDE AND ALBUTEROL SULFATE 2.5; .5 MG/3ML; MG/3ML
3 SOLUTION RESPIRATORY (INHALATION) ONCE
Status: COMPLETED | OUTPATIENT
Start: 2024-02-07 | End: 2024-02-07

## 2024-02-07 RX ORDER — ALBUTEROL SULFATE 90 UG/1
2 AEROSOL, METERED RESPIRATORY (INHALATION) EVERY 4 HOURS
Status: DISCONTINUED | OUTPATIENT
Start: 2024-02-07 | End: 2024-02-13 | Stop reason: HOSPADM

## 2024-02-07 RX ORDER — ONDANSETRON HYDROCHLORIDE 2 MG/ML
4 INJECTION, SOLUTION INTRAVENOUS EVERY 8 HOURS PRN
Status: DISCONTINUED | OUTPATIENT
Start: 2024-02-07 | End: 2024-02-13 | Stop reason: HOSPADM

## 2024-02-07 RX ORDER — TORSEMIDE 20 MG/1
40 TABLET ORAL DAILY
Status: DISCONTINUED | OUTPATIENT
Start: 2024-02-08 | End: 2024-02-12

## 2024-02-07 RX ORDER — METFORMIN HYDROCHLORIDE 500 MG/1
1000 TABLET ORAL
Status: DISCONTINUED | OUTPATIENT
Start: 2024-02-08 | End: 2024-02-13 | Stop reason: HOSPADM

## 2024-02-07 RX ORDER — POTASSIUM CHLORIDE 20 MEQ/1
20 TABLET, EXTENDED RELEASE ORAL DAILY
Status: DISCONTINUED | OUTPATIENT
Start: 2024-02-08 | End: 2024-02-13 | Stop reason: HOSPADM

## 2024-02-07 RX ORDER — DEXTROSE MONOHYDRATE 100 MG/ML
0.3 INJECTION, SOLUTION INTRAVENOUS ONCE AS NEEDED
Status: DISCONTINUED | OUTPATIENT
Start: 2024-02-07 | End: 2024-02-13 | Stop reason: HOSPADM

## 2024-02-07 RX ORDER — ATORVASTATIN CALCIUM 80 MG/1
80 TABLET, FILM COATED ORAL NIGHTLY
Status: DISCONTINUED | OUTPATIENT
Start: 2024-02-07 | End: 2024-02-13 | Stop reason: HOSPADM

## 2024-02-07 RX ORDER — METOPROLOL SUCCINATE 25 MG/1
25 TABLET, EXTENDED RELEASE ORAL DAILY
Status: DISCONTINUED | OUTPATIENT
Start: 2024-02-08 | End: 2024-02-12

## 2024-02-07 RX ADMIN — SODIUM CHLORIDE 500 ML: 9 INJECTION, SOLUTION INTRAVENOUS at 16:01

## 2024-02-07 RX ADMIN — ALBUTEROL SULFATE 2.5 MG: 2.5 SOLUTION RESPIRATORY (INHALATION) at 20:58

## 2024-02-07 RX ADMIN — SODIUM CHLORIDE 1000 ML: 9 INJECTION, SOLUTION INTRAVENOUS at 16:50

## 2024-02-07 RX ADMIN — SODIUM CHLORIDE 500 ML: 9 INJECTION, SOLUTION INTRAVENOUS at 16:20

## 2024-02-07 RX ADMIN — METHYLPREDNISOLONE SODIUM SUCCINATE 125 MG: 125 INJECTION, POWDER, FOR SOLUTION INTRAMUSCULAR; INTRAVENOUS at 21:55

## 2024-02-07 RX ADMIN — SODIUM CHLORIDE 500 ML: 9 INJECTION, SOLUTION INTRAVENOUS at 20:03

## 2024-02-07 RX ADMIN — Medication: at 15:45

## 2024-02-07 RX ADMIN — IOHEXOL 75 ML: 350 INJECTION, SOLUTION INTRAVENOUS at 19:15

## 2024-02-07 RX ADMIN — CEFTRIAXONE SODIUM 1 G: 1 INJECTION, SOLUTION INTRAVENOUS at 17:29

## 2024-02-07 RX ADMIN — Medication 2 L/MIN: at 20:58

## 2024-02-07 RX ADMIN — IPRATROPIUM BROMIDE AND ALBUTEROL SULFATE 3 ML: 2.5; .5 SOLUTION RESPIRATORY (INHALATION) at 20:58

## 2024-02-07 SDOH — SOCIAL STABILITY: SOCIAL INSECURITY: DO YOU FEEL ANYONE HAS EXPLOITED OR TAKEN ADVANTAGE OF YOU FINANCIALLY OR OF YOUR PERSONAL PROPERTY?: NO

## 2024-02-07 SDOH — SOCIAL STABILITY: SOCIAL INSECURITY: DO YOU FEEL UNSAFE GOING BACK TO THE PLACE WHERE YOU ARE LIVING?: NO

## 2024-02-07 SDOH — SOCIAL STABILITY: SOCIAL INSECURITY: ARE YOU OR HAVE YOU BEEN THREATENED OR ABUSED PHYSICALLY, EMOTIONALLY, OR SEXUALLY BY ANYONE?: NO

## 2024-02-07 SDOH — SOCIAL STABILITY: SOCIAL INSECURITY: HAS ANYONE EVER THREATENED TO HURT YOUR FAMILY OR YOUR PETS?: NO

## 2024-02-07 SDOH — SOCIAL STABILITY: SOCIAL INSECURITY: DOES ANYONE TRY TO KEEP YOU FROM HAVING/CONTACTING OTHER FRIENDS OR DOING THINGS OUTSIDE YOUR HOME?: NO

## 2024-02-07 SDOH — SOCIAL STABILITY: SOCIAL INSECURITY: HAVE YOU HAD THOUGHTS OF HARMING ANYONE ELSE?: NO

## 2024-02-07 SDOH — SOCIAL STABILITY: SOCIAL INSECURITY: ABUSE: ADULT

## 2024-02-07 SDOH — SOCIAL STABILITY: SOCIAL INSECURITY: WERE YOU ABLE TO COMPLETE ALL THE BEHAVIORAL HEALTH SCREENINGS?: YES

## 2024-02-07 SDOH — SOCIAL STABILITY: SOCIAL INSECURITY: ARE THERE ANY APPARENT SIGNS OF INJURIES/BEHAVIORS THAT COULD BE RELATED TO ABUSE/NEGLECT?: NO

## 2024-02-07 ASSESSMENT — COLUMBIA-SUICIDE SEVERITY RATING SCALE - C-SSRS
6. HAVE YOU EVER DONE ANYTHING, STARTED TO DO ANYTHING, OR PREPARED TO DO ANYTHING TO END YOUR LIFE?: NO
6. HAVE YOU EVER DONE ANYTHING, STARTED TO DO ANYTHING, OR PREPARED TO DO ANYTHING TO END YOUR LIFE?: NO
1. IN THE PAST MONTH, HAVE YOU WISHED YOU WERE DEAD OR WISHED YOU COULD GO TO SLEEP AND NOT WAKE UP?: NO
2. HAVE YOU ACTUALLY HAD ANY THOUGHTS OF KILLING YOURSELF?: NO
2. HAVE YOU ACTUALLY HAD ANY THOUGHTS OF KILLING YOURSELF?: NO
1. IN THE PAST MONTH, HAVE YOU WISHED YOU WERE DEAD OR WISHED YOU COULD GO TO SLEEP AND NOT WAKE UP?: NO

## 2024-02-07 ASSESSMENT — ACTIVITIES OF DAILY LIVING (ADL)
ADEQUATE_TO_COMPLETE_ADL: YES
HEARING - RIGHT EAR: FUNCTIONAL
HEARING - LEFT EAR: FUNCTIONAL
JUDGMENT_ADEQUATE_SAFELY_COMPLETE_DAILY_ACTIVITIES: YES
PATIENT'S MEMORY ADEQUATE TO SAFELY COMPLETE DAILY ACTIVITIES?: YES
FEEDING YOURSELF: INDEPENDENT
ASSISTIVE_DEVICE: CANE;WALKER
GROOMING: INDEPENDENT
BATHING: NEEDS ASSISTANCE
DRESSING YOURSELF: INDEPENDENT
TOILETING: NEEDS ASSISTANCE

## 2024-02-07 ASSESSMENT — LIFESTYLE VARIABLES
AUDIT-C TOTAL SCORE: 0
EVER HAD A DRINK FIRST THING IN THE MORNING TO STEADY YOUR NERVES TO GET RID OF A HANGOVER: NO
HOW MANY STANDARD DRINKS CONTAINING ALCOHOL DO YOU HAVE ON A TYPICAL DAY: PATIENT DOES NOT DRINK
HAVE PEOPLE ANNOYED YOU BY CRITICIZING YOUR DRINKING: NO
HOW OFTEN DO YOU HAVE 6 OR MORE DRINKS ON ONE OCCASION: NEVER
HOW OFTEN DO YOU HAVE A DRINK CONTAINING ALCOHOL: NEVER
SKIP TO QUESTIONS 9-10: 1
EVER FELT BAD OR GUILTY ABOUT YOUR DRINKING: NO
HAVE YOU EVER FELT YOU SHOULD CUT DOWN ON YOUR DRINKING: NO
AUDIT-C TOTAL SCORE: 0

## 2024-02-07 ASSESSMENT — COGNITIVE AND FUNCTIONAL STATUS - GENERAL
PERSONAL GROOMING: A LITTLE
CLIMB 3 TO 5 STEPS WITH RAILING: A LITTLE
MOVING FROM LYING ON BACK TO SITTING ON SIDE OF FLAT BED WITH BEDRAILS: A LITTLE
DAILY ACTIVITIY SCORE: 19
WALKING IN HOSPITAL ROOM: A LITTLE
STANDING UP FROM CHAIR USING ARMS: A LITTLE
PATIENT BASELINE BEDBOUND: NO
TURNING FROM BACK TO SIDE WHILE IN FLAT BAD: A LITTLE
MOVING TO AND FROM BED TO CHAIR: A LITTLE
TOILETING: A LITTLE
DRESSING REGULAR UPPER BODY CLOTHING: A LITTLE
HELP NEEDED FOR BATHING: A LITTLE
MOBILITY SCORE: 18
DRESSING REGULAR LOWER BODY CLOTHING: A LITTLE

## 2024-02-07 ASSESSMENT — PATIENT HEALTH QUESTIONNAIRE - PHQ9
1. LITTLE INTEREST OR PLEASURE IN DOING THINGS: NOT AT ALL
2. FEELING DOWN, DEPRESSED OR HOPELESS: NOT AT ALL
SUM OF ALL RESPONSES TO PHQ9 QUESTIONS 1 & 2: 0

## 2024-02-07 ASSESSMENT — PAIN SCALES - GENERAL: PAINLEVEL_OUTOF10: 0 - NO PAIN

## 2024-02-07 ASSESSMENT — PAIN - FUNCTIONAL ASSESSMENT: PAIN_FUNCTIONAL_ASSESSMENT: 0-10

## 2024-02-07 NOTE — ED PROVIDER NOTES
64-year-old female presents emergency department with chief complaint of low blood pressure, shortness of breath, and lightheadedness.  Patient reports that she had an appointment with her pain management doctor today and they found her blood pressure to be low and advised her to come here to the emergency department.  Patient states that she also was found to have low blood pressure yesterday when she had a biopsy done on my breast lesion.  She states she has been feeling lightheaded like she may pass out, but denies any recent falls or head injury.  She also states she feels short of breath when exerting herself.  No fevers, coughing, or congestion.  Patient denies any significant chest pain.  No abdominal pain, nausea, vomiting, dysuria, diarrhea, constipation, black or bloody stools.  Patient does report worsening lower extremity edema recently. Chart review shows significant past medical history of complex regional pain syndrome, peripheral vascular disease, hypertension, diabetes, GERD, hypothyroidism, depression, previous tobacco use, elevated BMI, osteoarthritis, chronic back pain, CAD, and peripheral arterial disease.  Denies any illicit drug use or regular alcohol use. Patient is on Plavix and denies any other anticoagulation.      History provided by:  Patient   used: No           ------------------------------------------------------------------------------------------------------------------------------------------    VS: As documented in the triage note and EMR flowsheet from this visit were reviewed.    Review of Systems  Constitutional: no fever, chills reports malaise  Eyes: no redness, discharge, pain  HENT: no sore throat, nose bleeds, congestion, rhinorrhea   Cardiovascular: no chest pain,palpitations admits to chronic leg edema that is worsened  Respiratory: Reports shortness of breath no cough, wheezing  GI: no nausea, diarrhea, pain, vomiting, constipation, BRBPR,  melena  : no dysuria, frequency, hematuria  Musculoskeletal: no neck pain, stiffness,  no joint deformity, swelling  Skin: no rash, erythema, wounds  Neurological: no headache, dizziness,  weakness, numbness, or tingling admits to feeling lightheaded  Psychiatric: no suicidal thoughts, confusion, agitation  Metabolic: no polyuria or polydipsia  Hematologic: no increased bleeding or bruising  Immunology: No immunocompromise state    [facility]  Nursing notes reviewed and confirmed by me.  Chart review performed including medications, allergies, and medical, surgical, and family history  Visit Vitals  /62   Pulse 79   Temp 36.6 °C (97.9 °F)   Resp 16     Physical Exam  Vitals and nursing note reviewed.   Constitutional:       General: She is not in acute distress.     Appearance: Normal appearance. She is not ill-appearing.   HENT:      Head: Normocephalic and atraumatic.      Right Ear: External ear normal.      Left Ear: External ear normal.      Nose: Nose normal. No congestion or rhinorrhea.      Mouth/Throat:      Mouth: Mucous membranes are moist.      Pharynx: No oropharyngeal exudate or posterior oropharyngeal erythema.   Eyes:      Extraocular Movements: Extraocular movements intact.      Conjunctiva/sclera: Conjunctivae normal.      Pupils: Pupils are equal, round, and reactive to light.   Cardiovascular:      Rate and Rhythm: Normal rate and regular rhythm.      Pulses: Normal pulses.      Heart sounds: Normal heart sounds.   Pulmonary:      Effort: Pulmonary effort is normal. No respiratory distress.      Breath sounds: No stridor. Rhonchi present. No wheezing or rales.      Comments: Coarse breath sounds bilaterally.  Patient is hypoxic on room air  Abdominal:      General: There is no distension.      Palpations: Abdomen is soft.      Tenderness: There is no abdominal tenderness. There is no guarding or rebound.   Musculoskeletal:         General: No swelling or deformity. Normal range of motion.       Cervical back: Normal range of motion and neck supple. No rigidity.      Right lower leg: Edema present.      Left lower leg: Edema present.      Comments: No calf tenderness   Plus pitting edema bilateral lower extremities.   Skin:     General: Skin is warm and dry.      Capillary Refill: Capillary refill takes less than 2 seconds.      Coloration: Skin is not jaundiced.      Findings: Erythema (Bilateral lower extremity erythema consistent with her edema) present. No rash.   Neurological:      General: No focal deficit present.      Mental Status: She is alert and oriented to person, place, and time.      Sensory: No sensory deficit.      Motor: No weakness.   Psychiatric:         Mood and Affect: Mood normal.         Behavior: Behavior normal.        History reviewed. No pertinent past medical history.   Past Surgical History:   Procedure Laterality Date    ANTERIOR COMPARTMENT DECOMPRESSION      BREAST LUMPECTOMY      CARDIAC SURGERY      CAROTID STENT      CT AORTA AND BILATERAL ILIOFEMORAL RUNOFF ANGIOGRAM W AND/OR WO IV CONTRAST  2023    CT AORTA AND BILATERAL ILIOFEMORAL RUNOFF ANGIOGRAM W AND/OR WO IV CONTRAST 2023 [facility] CT    DILATION AND CURETTAGE OF UTERUS      LUMBAR LAMINECTOMY      TONSILLECTOMY      TOTAL KNEE ARTHROPLASTY        Social History     Socioeconomic History    Marital status:      Spouse name: None    Number of children: None    Years of education: None    Highest education level: None   Occupational History    None   Tobacco Use    Smoking status: Former     Types: Cigarettes     Quit date:      Years since quittin.1    Smokeless tobacco: Never   Vaping Use    Vaping Use: Never used   Substance and Sexual Activity    Alcohol use: Not Currently    Drug use: Never    Sexual activity: Defer   Other Topics Concern    None   Social History Narrative    None     Social Determinants of Health     Financial Resource Strain: Not on file   Food Insecurity: Not on file    Transportation Needs: Not on file   Physical Activity: Not on file   Stress: Not on file   Social Connections: Not on file   Intimate Partner Violence: Not on file   Housing Stability: Not on file      ------------------------------------------------------------------------------------------------------------------------------------------  CT angio chest for pulmonary embolism   Final Result   1. No detectable pulmonary emboli.   2. COPD; no infiltrates or vascular congestion.   3. Remainder as above.   Signed by Micheal Diop MD      XR chest 1 view   Final Result   No acute pulmonary pathology.   Signed by Tra Garcia MD         Labs Reviewed   CBC WITH AUTO DIFFERENTIAL - Abnormal       Result Value    WBC 6.0      nRBC 0.0      RBC 4.34      Hemoglobin 12.3      Hematocrit 39.1      MCV 90      MCH 28.3      MCHC 31.5 (*)     RDW 15.4 (*)     Platelets 220      Neutrophils % 68.2      Immature Granulocytes %, Automated 0.3      Lymphocytes % 18.3      Monocytes % 10.7      Eosinophils % 1.3      Basophils % 1.2      Neutrophils Absolute 4.06      Immature Granulocytes Absolute, Automated 0.02      Lymphocytes Absolute 1.09 (*)     Monocytes Absolute 0.64      Eosinophils Absolute 0.08      Basophils Absolute 0.07     COMPREHENSIVE METABOLIC PANEL - Abnormal    Glucose 485 (*)     Sodium 133 (*)     Potassium 3.7      Chloride 97 (*)     Bicarbonate 27      Anion Gap 13      Urea Nitrogen 9      Creatinine 0.94      eGFR 67      Calcium 8.8      Albumin 3.3 (*)     Alkaline Phosphatase 127      Total Protein 6.8      AST 46 (*)     Bilirubin, Total 0.9      ALT 35     LACTATE - Abnormal    Lactate 3.6 (*)     Narrative:     Venipuncture immediately after or during the administration of Metamizole may lead to falsely low results. Testing should be performed immediately  prior to Metamizole dosing.   D-DIMER, VTE EXCLUSION - Abnormal    D-Dimer, Quantitative VTE Exclusion 1,578 (*)     Narrative:     The VTE  Exclusion D-Dimer assay is reported in ng/mL Fibrinogen Equivalent Units (FEU).    Per 's instructions for use, a value of less than 500 ng/mL (FEU) may help to exclude DVT or PE in outpatients when the assay is used with a clinical pretest probability assessment.(AEMR must utilize and document eCalc 'Wells Score Deep Vein Thrombosis Risk' for DVT exclusion only. Emergency Department should utilize  Guidelines for Emergency Department Use of the VTE Exclusion D-Dimer and Clinical Pretest probability assessment model for DVT or PE exclusion.)   SERIAL TROPONIN-INITIAL - Abnormal    Troponin I, High Sensitivity 17 (*)     Narrative:     Less than 99th percentile of normal range cutoff-  Female and children under 18 years old <14 ng/L; Male <21 ng/L: Negative  Repeat testing should be performed if clinically indicated.     Female and children under 18 years old 14-50 ng/L; Male 21-50 ng/L:  Consistent with possible cardiac damage and possible increased clinical   risk. Serial measurements may help to assess extent of myocardial damage.     >50 ng/L: Consistent with cardiac damage, increased clinical risk and  myocardial infarction. Serial measurements may help assess extent of   myocardial damage.      NOTE: Children less than 1 year old may have higher baseline troponin   levels and results should be interpreted in conjunction with the overall   clinical context.     NOTE: Troponin I testing is performed using a different   testing methodology at [facility] than at other   [facility]. Direct result comparisons should only   be made within the same method.   URINALYSIS WITH REFLEX CULTURE AND MICROSCOPIC - Abnormal    Color, Urine Yellow      Appearance, Urine Hazy (*)     Specific Gravity, Urine 1.030      pH, Urine 5.0      Protein, Urine NEGATIVE      Glucose, Urine >=500 (3+) (*)     Blood, Urine NEGATIVE      Ketones, Urine NEGATIVE      Bilirubin, Urine NEGATIVE       Urobilinogen, Urine 2.0 (*)     Nitrite, Urine POSITIVE (*)     Leukocyte Esterase, Urine SMALL (1+) (*)    SERIAL TROPONIN, 1 HOUR - Abnormal    Troponin I, High Sensitivity 17 (*)     Narrative:     Less than 99th percentile of normal range cutoff-  Female and children under 18 years old <14 ng/L; Male <21 ng/L: Negative  Repeat testing should be performed if clinically indicated.     Female and children under 18 years old 14-50 ng/L; Male 21-50 ng/L:  Consistent with possible cardiac damage and possible increased clinical   risk. Serial measurements may help to assess extent of myocardial damage.     >50 ng/L: Consistent with cardiac damage, increased clinical risk and  myocardial infarction. Serial measurements may help assess extent of   myocardial damage.      NOTE: Children less than 1 year old may have higher baseline troponin   levels and results should be interpreted in conjunction with the overall   clinical context.     NOTE: Troponin I testing is performed using a different   testing methodology at [facility] than at other   [facility]. Direct result comparisons should only   be made within the same method.   LACTATE - Abnormal    Lactate 2.3 (*)     Narrative:     Venipuncture immediately after or during the administration of Metamizole may lead to falsely low results. Testing should be performed immediately  prior to Metamizole dosing.   MICROSCOPIC ONLY, URINE - Abnormal    WBC, Urine 21-50 (*)     RBC, Urine 1-2      Squamous Epithelial Cells, Urine 1-9 (SPARSE)      Bacteria, Urine 1+ (*)    POCT GLUCOSE - Abnormal    POCT Glucose 310 (*)    BLOOD CULTURE - Normal    Blood Culture Loaded on Instrument - Culture in progress     MAGNESIUM - Normal    Magnesium 1.67     PROTIME-INR - Normal    Protime 11.9      INR 1.1     APTT - Normal    aPTT 31      Narrative:     The APTT is no longer used for monitoring Unfractionated Heparin Therapy. For monitoring Heparin Therapy, use the  Heparin Assay.   B-TYPE NATRIURETIC PEPTIDE - Normal    BNP 43      Narrative:        <100 pg/mL - Heart failure unlikely  100-299 pg/mL - Intermediate probability of acute heart                  failure exacerbation. Correlate with clinical                  context and patient history.    >=300 pg/mL - Heart Failure likely. Correlate with clinical                  context and patient history.    BNP testing is performed using different testing methodology at [facility] than at other [facility]. Direct result comparisons should only be made within the same method.      SARS-COV-2 AND INFLUENZA A/B PCR - Normal    Flu A Result Not Detected      Flu B Result Not Detected      Coronavirus 2019, PCR Not Detected      Narrative:     This assay has received FDA Emergency Use Authorization (EUA) and  is only authorized for the duration of time that circumstances exist to justify the authorization of the emergency use of in vitro diagnostic tests for the detection of SARS-CoV-2 virus and/or diagnosis of COVID-19 infection under section 564(b)(1) of the Act, 21 U.S.C. 360bbb-3(b)(1). Testing for SARS-CoV-2 is only recommended for patients who meet current clinical and/or epidemiological criteria as defined by federal, state, or local public health directives. This assay is an in vitro diagnostic nucleic acid amplification test for the qualitative detection of SARS-CoV-2, Influenza A, and Influenza B from nasopharyngeal specimens and has been validated for use at [facility]. Negative results do not preclude COVID-19 infections or Influenza A/B infections, and should not be used as the sole basis for diagnosis, treatment, or other management decisions. If Influenza A/B and RSV PCR results are negative, testing for Parainfluenza virus, Adenovirus and Metapneumovirus is routinely performed for [facility] pediatric oncology and intensive care inpatients, and is available on other patients by  placing an add-on request.    BLOOD CULTURE   URINE CULTURE   TROPONIN SERIES- (INITIAL, 1 HR)    Narrative:     The following orders were created for panel order Troponin I Series, High Sensitivity (0, 1 HR).  Procedure                               Abnormality         Status                     ---------                               -----------         ------                     Troponin I, High Sensiti...[[id number]]  Abnormal            Final result               Troponin, High Sensitivi...[[id number]]  Abnormal            Final result                 Please view results for these tests on the individual orders.   URINALYSIS WITH REFLEX CULTURE AND MICROSCOPIC    Narrative:     The following orders were created for panel order Urinalysis with Reflex Culture and Microscopic.  Procedure                               Abnormality         Status                     ---------                               -----------         ------                     Urinalysis with Reflex C...[[id number]]  Abnormal            Final result               Extra Urine Gray Tube[[id number]]                            In process                   Please view results for these tests on the individual orders.   EXTRA URINE GRAY TUBE   POCT FINGERSTICK GLUCOSE        Medical Decision Making  EKG interpreted by ED physician: Normal sinus rhythm rate of 86.  TN, QRS, QTc intervals all within normal limits.  No significant ST elevations or depressions.  Q waves present in inferior leads may represent previous infarct.  Good R wave progression.  Left axis deviation.    64-year-old female presents emergency department with chief complaint of low blood pressures, shortness of breath, and lightheadedness.  On my exam patient is appreciated to be hypotensive and mildly hypoxic on room air.  Patient is placed on nasal cannula oxygen.  She is given fluid resuscitation and her blood pressure does seem fluid responsive.  Given her presenting complaints a  thorough workup was obtained.  UA is concerning for urinary tract infection.  D-dimer is elevated but subsequent CT PE study does not show any pulmonary embolus.  She does have findings of COPD.  Patient treated for COPD exacerbation with aerosol treatment, Solu-Medrol.  CBC does not show significant leukocytosis or anemia.  Cultures are obtained given patient's hypotension and findings of UTI.  Though I have lower suspicion for severe sepsis.  Cardiac enzymes x 2 are mildly elevated, but not significantly increasing.  EKG does not show acute ACS or significant arrhythmia.  CMP shows hyperglycemia which does improve with fluid bolus.  Patient does not have findings of DKA.  Flu and COVID testing are negative.  BNP is within normal range making CHF less likely.  Chest x-ray shows no acute cardiopulmonary process such as pneumonia, pleural effusion, pulmonary edema.  Given the patient's findings of COPD exacerbation with hypoxia, UTI, and hypotension I do feel she would benefit from mission for further evaluation and treatment.  Case discussed with hospitalist on-call.  Advised patient and family of findings thus far and recommendation for admission and they are in agreement with this plan.       Diagnoses as of 02/07/24 2104   Hypotension, unspecified hypotension type   Urinary tract infection without hematuria, site unspecified   Hypoxia   COPD exacerbation (CMS/[facility])      1. Hypotension, unspecified hypotension type        2. Urinary tract infection without hematuria, site unspecified        3. Hypoxia        4. COPD exacerbation (CMS/[facility])           Critical Care    Performed by: Octavio Null DO  Authorized by: Octavio Null DO    Critical care provider statement:     Critical care time (minutes):  35    Critical care time was exclusive of:  Separately billable procedures and treating other patients    Critical care was necessary to treat or prevent imminent or life-threatening deterioration of the following  conditions: hypotension.    Critical care was time spent personally by me on the following activities:  Development of treatment plan with patient or surrogate, evaluation of patient's response to treatment, examination of patient, re-evaluation of patient's condition, pulse oximetry, ordering and review of radiographic studies, ordering and review of laboratory studies and ordering and performing treatments and interventions    Care discussed with: admitting provider         This note was dictated using dragon software and may contain errors related to dictation interpretation errors.      Octavio Null,   02/07/24 2105

## 2024-02-08 LAB
ALBUMIN SERPL BCP-MCNC: 3.3 G/DL (ref 3.4–5)
ALP SERPL-CCNC: 130 U/L (ref 33–136)
ALT SERPL W P-5'-P-CCNC: 38 U/L (ref 7–45)
ANION GAP SERPL CALC-SCNC: 16 MMOL/L (ref 10–20)
AST SERPL W P-5'-P-CCNC: 43 U/L (ref 9–39)
BILIRUB SERPL-MCNC: 0.6 MG/DL (ref 0–1.2)
BUN SERPL-MCNC: 10 MG/DL (ref 6–23)
CALCIUM SERPL-MCNC: 9 MG/DL (ref 8.6–10.3)
CHLORIDE SERPL-SCNC: 99 MMOL/L (ref 98–107)
CO2 SERPL-SCNC: 25 MMOL/L (ref 21–32)
CREAT SERPL-MCNC: 0.94 MG/DL (ref 0.5–1.05)
EGFRCR SERPLBLD CKD-EPI 2021: 67 ML/MIN/1.73M*2
ERYTHROCYTE [DISTWIDTH] IN BLOOD BY AUTOMATED COUNT: 15.4 % (ref 11.5–14.5)
GLUCOSE BLD MANUAL STRIP-MCNC: 348 MG/DL (ref 74–99)
GLUCOSE BLD MANUAL STRIP-MCNC: 386 MG/DL (ref 74–99)
GLUCOSE BLD MANUAL STRIP-MCNC: 465 MG/DL (ref 74–99)
GLUCOSE BLD MANUAL STRIP-MCNC: 474 MG/DL (ref 74–99)
GLUCOSE SERPL-MCNC: 531 MG/DL (ref 74–99)
HCT VFR BLD AUTO: 40.3 % (ref 36–46)
HGB BLD-MCNC: 12.4 G/DL (ref 12–16)
HOLD SPECIMEN: NORMAL
HOLD SPECIMEN: NORMAL
MCH RBC QN AUTO: 28.2 PG (ref 26–34)
MCHC RBC AUTO-ENTMCNC: 30.8 G/DL (ref 32–36)
MCV RBC AUTO: 92 FL (ref 80–100)
NRBC BLD-RTO: 0 /100 WBCS (ref 0–0)
PLATELET # BLD AUTO: 211 X10*3/UL (ref 150–450)
POTASSIUM SERPL-SCNC: 4.5 MMOL/L (ref 3.5–5.3)
PROT SERPL-MCNC: 6.6 G/DL (ref 6.4–8.2)
RBC # BLD AUTO: 4.4 X10*6/UL (ref 4–5.2)
SODIUM SERPL-SCNC: 135 MMOL/L (ref 136–145)
WBC # BLD AUTO: 4.7 X10*3/UL (ref 4.4–11.3)

## 2024-02-08 PROCEDURE — 82947 ASSAY GLUCOSE BLOOD QUANT: CPT

## 2024-02-08 PROCEDURE — 99236 HOSP IP/OBS SAME DATE HI 85: CPT | Performed by: FAMILY MEDICINE

## 2024-02-08 PROCEDURE — 2500000002 HC RX 250 W HCPCS SELF ADMINISTERED DRUGS (ALT 637 FOR MEDICARE OP, ALT 636 FOR OP/ED): Performed by: FAMILY MEDICINE

## 2024-02-08 PROCEDURE — 2500000002 HC RX 250 W HCPCS SELF ADMINISTERED DRUGS (ALT 637 FOR MEDICARE OP, ALT 636 FOR OP/ED): Performed by: HOSPITALIST

## 2024-02-08 PROCEDURE — 80053 COMPREHEN METABOLIC PANEL: CPT | Performed by: FAMILY MEDICINE

## 2024-02-08 PROCEDURE — 2500000001 HC RX 250 WO HCPCS SELF ADMINISTERED DRUGS (ALT 637 FOR MEDICARE OP): Performed by: FAMILY MEDICINE

## 2024-02-08 PROCEDURE — 2500000005 HC RX 250 GENERAL PHARMACY W/O HCPCS: Performed by: FAMILY MEDICINE

## 2024-02-08 PROCEDURE — 2500000004 HC RX 250 GENERAL PHARMACY W/ HCPCS (ALT 636 FOR OP/ED): Performed by: FAMILY MEDICINE

## 2024-02-08 PROCEDURE — 1210000001 HC SEMI-PRIVATE ROOM DAILY

## 2024-02-08 PROCEDURE — 85027 COMPLETE CBC AUTOMATED: CPT | Performed by: FAMILY MEDICINE

## 2024-02-08 PROCEDURE — 97165 OT EVAL LOW COMPLEX 30 MIN: CPT | Mod: GO

## 2024-02-08 PROCEDURE — 94640 AIRWAY INHALATION TREATMENT: CPT

## 2024-02-08 PROCEDURE — 97161 PT EVAL LOW COMPLEX 20 MIN: CPT | Mod: GP | Performed by: PHYSICAL THERAPIST

## 2024-02-08 PROCEDURE — 36415 COLL VENOUS BLD VENIPUNCTURE: CPT | Performed by: FAMILY MEDICINE

## 2024-02-08 RX ORDER — CEFDINIR 300 MG/1
300 CAPSULE ORAL 2 TIMES DAILY
Qty: 14 CAPSULE | Refills: 0 | Status: SHIPPED | OUTPATIENT
Start: 2024-02-08 | End: 2024-02-15

## 2024-02-08 RX ORDER — NALOXONE HYDROCHLORIDE 1 MG/ML
0.4 INJECTION INTRAMUSCULAR; INTRAVENOUS; SUBCUTANEOUS AS NEEDED
Status: DISCONTINUED | OUTPATIENT
Start: 2024-02-08 | End: 2024-02-13 | Stop reason: HOSPADM

## 2024-02-08 RX ORDER — DEXTROSE MONOHYDRATE 100 MG/ML
0.3 INJECTION, SOLUTION INTRAVENOUS ONCE AS NEEDED
Status: DISCONTINUED | OUTPATIENT
Start: 2024-02-08 | End: 2024-02-08

## 2024-02-08 RX ORDER — DEXTROSE 50 % IN WATER (D50W) INTRAVENOUS SYRINGE
25
Status: DISCONTINUED | OUTPATIENT
Start: 2024-02-08 | End: 2024-02-08

## 2024-02-08 RX ADMIN — LOSARTAN POTASSIUM 50 MG: 50 TABLET, FILM COATED ORAL at 08:26

## 2024-02-08 RX ADMIN — ERGOCALCIFEROL 50000 UNITS: 1.25 CAPSULE ORAL at 08:26

## 2024-02-08 RX ADMIN — TORSEMIDE 40 MG: 20 TABLET ORAL at 08:27

## 2024-02-08 RX ADMIN — ALBUTEROL SULFATE 2 PUFF: 90 AEROSOL, METERED RESPIRATORY (INHALATION) at 17:00

## 2024-02-08 RX ADMIN — SODIUM CHLORIDE 75 ML/HR: 9 INJECTION, SOLUTION INTRAVENOUS at 00:28

## 2024-02-08 RX ADMIN — ATORVASTATIN CALCIUM 80 MG: 80 TABLET, FILM COATED ORAL at 20:27

## 2024-02-08 RX ADMIN — METFORMIN HYDROCHLORIDE 1000 MG: 500 TABLET, FILM COATED ORAL at 08:27

## 2024-02-08 RX ADMIN — ASPIRIN 81 MG: 81 TABLET, CHEWABLE ORAL at 08:27

## 2024-02-08 RX ADMIN — METOPROLOL SUCCINATE 25 MG: 25 TABLET, EXTENDED RELEASE ORAL at 08:27

## 2024-02-08 RX ADMIN — ALBUTEROL SULFATE 2 PUFF: 90 AEROSOL, METERED RESPIRATORY (INHALATION) at 12:10

## 2024-02-08 RX ADMIN — LEVOTHYROXINE SODIUM 137 MCG: 25 TABLET ORAL at 05:49

## 2024-02-08 RX ADMIN — CEFTRIAXONE SODIUM 1 G: 1 INJECTION, SOLUTION INTRAVENOUS at 18:09

## 2024-02-08 RX ADMIN — IPRATROPIUM BROMIDE AND ALBUTEROL SULFATE 3 ML: 2.5; .5 SOLUTION RESPIRATORY (INHALATION) at 00:45

## 2024-02-08 RX ADMIN — INSULIN LISPRO 5 UNITS: 100 INJECTION, SOLUTION INTRAVENOUS; SUBCUTANEOUS at 08:28

## 2024-02-08 RX ADMIN — ATORVASTATIN CALCIUM 80 MG: 80 TABLET, FILM COATED ORAL at 00:28

## 2024-02-08 RX ADMIN — RIVAROXABAN 2.5 MG: 2.5 TABLET, FILM COATED ORAL at 20:27

## 2024-02-08 RX ADMIN — INSULIN HUMAN 10 UNITS: 100 INJECTION, SOLUTION PARENTERAL at 16:55

## 2024-02-08 RX ADMIN — INSULIN HUMAN 5 UNITS: 100 INJECTION, SOLUTION PARENTERAL at 16:52

## 2024-02-08 RX ADMIN — GABAPENTIN 400 MG: 400 CAPSULE ORAL at 16:54

## 2024-02-08 RX ADMIN — METFORMIN HYDROCHLORIDE 1000 MG: 500 TABLET, FILM COATED ORAL at 16:45

## 2024-02-08 RX ADMIN — ALBUTEROL SULFATE 2 PUFF: 90 AEROSOL, METERED RESPIRATORY (INHALATION) at 08:28

## 2024-02-08 RX ADMIN — Medication 10 MG: at 20:27

## 2024-02-08 RX ADMIN — OXYCODONE HYDROCHLORIDE 15 MG: 5 TABLET ORAL at 20:27

## 2024-02-08 RX ADMIN — GABAPENTIN 400 MG: 400 CAPSULE ORAL at 00:28

## 2024-02-08 RX ADMIN — OXYCODONE HYDROCHLORIDE 15 MG: 5 TABLET ORAL at 00:08

## 2024-02-08 RX ADMIN — IPRATROPIUM BROMIDE AND ALBUTEROL SULFATE 3 ML: 2.5; .5 SOLUTION RESPIRATORY (INHALATION) at 08:01

## 2024-02-08 RX ADMIN — TIOTROPIUM BROMIDE INHALATION SPRAY 2 PUFF: 3.12 SPRAY, METERED RESPIRATORY (INHALATION) at 08:27

## 2024-02-08 RX ADMIN — RIVAROXABAN 2.5 MG: 2.5 TABLET, FILM COATED ORAL at 08:27

## 2024-02-08 RX ADMIN — OXYCODONE HYDROCHLORIDE 15 MG: 5 TABLET ORAL at 08:35

## 2024-02-08 RX ADMIN — POTASSIUM CHLORIDE 20 MEQ: 1500 TABLET, EXTENDED RELEASE ORAL at 08:26

## 2024-02-08 RX ADMIN — Medication 4.5 L/MIN: at 00:49

## 2024-02-08 RX ADMIN — GABAPENTIN 400 MG: 400 CAPSULE ORAL at 20:27

## 2024-02-08 RX ADMIN — TOPIRAMATE 25 MG: 25 TABLET ORAL at 20:27

## 2024-02-08 RX ADMIN — OXYCODONE HYDROCHLORIDE 15 MG: 5 TABLET ORAL at 15:17

## 2024-02-08 RX ADMIN — GABAPENTIN 400 MG: 400 CAPSULE ORAL at 08:26

## 2024-02-08 RX ADMIN — TOPIRAMATE 25 MG: 25 TABLET ORAL at 00:28

## 2024-02-08 RX ADMIN — IPRATROPIUM BROMIDE AND ALBUTEROL SULFATE 3 ML: 2.5; .5 SOLUTION RESPIRATORY (INHALATION) at 20:16

## 2024-02-08 RX ADMIN — INSULIN HUMAN 10 UNITS: 100 INJECTION, SOLUTION PARENTERAL at 11:53

## 2024-02-08 ASSESSMENT — COGNITIVE AND FUNCTIONAL STATUS - GENERAL
TOILETING: A LITTLE
MOVING TO AND FROM BED TO CHAIR: A LITTLE
WALKING IN HOSPITAL ROOM: A LITTLE
MOBILITY SCORE: 20
DRESSING REGULAR LOWER BODY CLOTHING: A LITTLE
CLIMB 3 TO 5 STEPS WITH RAILING: A LITTLE
PERSONAL GROOMING: A LITTLE
DRESSING REGULAR UPPER BODY CLOTHING: A LITTLE
MOBILITY SCORE: 20
CLIMB 3 TO 5 STEPS WITH RAILING: A LITTLE
DAILY ACTIVITIY SCORE: 19
PERSONAL GROOMING: A LITTLE
HELP NEEDED FOR BATHING: A LITTLE
STANDING UP FROM CHAIR USING ARMS: A LITTLE
HELP NEEDED FOR BATHING: A LITTLE
DRESSING REGULAR LOWER BODY CLOTHING: A LITTLE
WALKING IN HOSPITAL ROOM: A LITTLE
TOILETING: A LITTLE
DAILY ACTIVITIY SCORE: 19
DRESSING REGULAR UPPER BODY CLOTHING: A LITTLE
MOVING TO AND FROM BED TO CHAIR: A LITTLE
STANDING UP FROM CHAIR USING ARMS: A LITTLE

## 2024-02-08 ASSESSMENT — PAIN DESCRIPTION - LOCATION
LOCATION: BACK
LOCATION: BACK

## 2024-02-08 ASSESSMENT — PAIN SCALES - GENERAL
PAINLEVEL_OUTOF10: 8
PAINLEVEL_OUTOF10: 8
PAINLEVEL_OUTOF10: 3
PAINLEVEL_OUTOF10: 8
PAINLEVEL_OUTOF10: 7
PAINLEVEL_OUTOF10: 0 - NO PAIN

## 2024-02-08 ASSESSMENT — PAIN - FUNCTIONAL ASSESSMENT
PAIN_FUNCTIONAL_ASSESSMENT: 0-10

## 2024-02-08 ASSESSMENT — PAIN SCALES - WONG BAKER: WONGBAKER_NUMERICALRESPONSE: HURTS LITTLE BIT

## 2024-02-08 ASSESSMENT — ACTIVITIES OF DAILY LIVING (ADL): BATHING_ASSISTANCE: MINIMAL

## 2024-02-08 ASSESSMENT — PAIN DESCRIPTION - ORIENTATION: ORIENTATION: LOWER

## 2024-02-08 NOTE — DISCHARGE SUMMARY
Discharge Diagnosis  Hypotension, unspecified hypotension type    Issues Requiring Follow-Up       Test Results Pending At Discharge  Pending Labs       Order Current Status    Urine Culture In process    Blood Culture Preliminary result    Blood Culture Preliminary result            Hospital Course   Patient presented with hypotension.  Also noted to have urinary tract infection.  COPD exacerbation.  Responded to IV fluids.  Patient was admitted.  Blood pressure improved.  Tolerated IV antibiotics.  Respiratory status improved.  Blood sugar was increased secondary to steroids.  Steroids were stopped.  Inhalation therapy was continued.  At this point patient's blood pressure improved.  She will be discharged home.  Completed course of antibiotics.  Continue inhalation therapy.  She has an appointment upcoming with her cardiologist on Monday.  Will continue current medical therapy in the interim.    February 13, 2024 patient was not able to be discharged as previous.  She had increasing oxygen requirement.  Increasing hyperglycemia.  Increasing hypotension.  Discharge was held.  Endocrinology was consulted.  Glipizide was added.  Steroids were stopped.  Blood sugar improved.  Infectious disease was consulted.  IV antibiotics were changed.  IV fluid boluses were given.  Cardiology was consulted.  Subsequently improved.  At this point she is still requiring home oxygen.  Will evaluate for home oxygen.  Otherwise blood sugar stabilizing.  Blood pressure improving.  She is completed IV fluid resuscitation.  At this point she will be discharged home.  Home O2 eval pending.  Glipizide was added.  Prescription provided.  Midodrine was added.  Prescription provided.  She will follow-up with primary care physician in 2 weeks.  She will go home on a course of oral antibiotics.  Pertinent Physical Exam At Time of Discharge  Physical Exam    Home Medications     Medication List      START taking these medications     cefdinir  300 mg capsule; Commonly known as: Omnicef; Take 1 capsule (300   mg) by mouth 2 times a day for 7 days.     CONTINUE taking these medications     albuterol 90 mcg/actuation inhaler   aspirin 81 mg chewable tablet   atorvastatin 80 mg tablet; Commonly known as: Lipitor   ergocalciferol 1.25 MG (14321 UT) capsule; Commonly known as: Vitamin   D-2; Take 1 capsule (50,000 Units) by mouth 1 (one) time per week.   gabapentin 400 mg capsule; Commonly known as: Neurontin   levothyroxine 137 mcg tablet; Commonly known as: Synthroid, Levoxyl;   Take 1 tablet (137 mcg) by mouth once daily.   losartan 50 mg tablet; Commonly known as: Cozaar   melatonin 10 mg tablet   metFORMIN 1,000 mg tablet; Commonly known as: Glucophage; Take 1 tablet   (1,000 mg) by mouth 2 times a day with meals.   metoprolol succinate XL 25 mg 24 hr tablet; Commonly known as: Toprol-XL   naloxone 4 mg/0.1 mL nasal spray; Commonly known as: Narcan   nitroglycerin 0.4 mg SL tablet; Commonly known as: Nitrostat   OneTouch Delica Plus Lancet 33 gauge misc; Generic drug: lancets   OneTouch Verio test strips strip; Generic drug: blood sugar diagnostic   oxyCODONE 15 mg immediate release tablet; Commonly known as: Roxicodone   potassium chloride CR 20 mEq ER tablet; Commonly known as: Klor-Con M20;   TAKE 1 TABLET(20 MEQ) BY MOUTH EVERY DAY. DO NOT CRUSH OR CHEW   Spiriva Respimat 2.5 mcg/actuation inhaler; Generic drug: tiotropium   topiramate 25 mg tablet; Commonly known as: Topamax   torsemide 20 mg tablet; Commonly known as: Demadex; TAKE 2 TABLETS BY   MOUTH EVERY DAY   Xarelto 2.5 mg tablet; Generic drug: rivaroxaban       Outpatient Follow-Up  Future Appointments   Date Time Provider Department [place]   2/12/2024 10:15 AM Ronald Wellington MD [id number] [place]   2/16/2024  2:30 PM Juana Pereira MD [id number] [place]   3/13/2024 11:15 AM Dagoberto Baxter MD [id number] [place]   4/4/2024 10:00 AM [facility]Y ULTRASOUND 3 [facility]    4/4/2024  1:00 PM Tom PARDO  MD [facility] [id number] [place]       Tommy Dietz MD

## 2024-02-08 NOTE — PROGRESS NOTES
Spoke with patient regarding discharge plans. Patient states she lives at home with her . Patient is currently on 4 liters of oxygen, states she is not on any oxygen at home. Per LINO Guidry, patient also has elevated blood sugar in the 400's is notifying PCP for further insulin orders. Discussed with RN patient is not stable for discharge with elevated blood glucose levels and on 4 liters of oxygen. Will reassess tomorrow.

## 2024-02-08 NOTE — PROGRESS NOTES
Occupational Therapy    Evaluation/Treatment    Patient Name: Daphney Chen  MRN: [id number]  : 1960  Today's Date: 24  Time Calculation  Start Time: 949  Stop Time:   Time Calculation (min): 27 min       Assessment:  End of Session Communication: Bedside nurse  End of Session Patient Position:  (seated at EOB, sponge bathing)  OT Assessment Results: Decreased ADL status, Decreased upper extremity range of motion, Decreased endurance, Decreased functional mobility    Plan:  Treatment Interventions: ADL retraining, Functional transfer training, Patient/family training  OT Frequency: 2 times per week  OT Discharge Recommendations: Low intensity level of continued care  Equipment Recommended upon Discharge:  (clamp on grab bar at edge of tub)  OT Recommended Transfer Status:  (CGA)  OT - OK to Discharge: Yes (when deemed medically stable)  Treatment Interventions: ADL retraining, Functional transfer training, Patient/family training  Subjective               General:   OT Received On: 24  General  Reason for Referral: ADL impairment  Referred By: ENRIQUE Dietz PT/OT eval and tx 24  Past Medical History Relevant to Rehab: dyslipidemia, HTN, depression, DM, CAD, GERD, hypothyroidism, obesity, OA, AAA, TKA, PVD, complex regional pain syndrome L UE  Patient Position Received: Bed, 2 rail up (Pt on room air, 4Lo2 turned on at wall.)  General Comment: Pt to ED  with hypotension, SOB and lightheadedness during pain mgmt appt. Sent to ED from dr office. Dx: COPD exacerbation, UTI, hypotension. D dimer 1578, flu and covid both negative, CTA PE  negative PE    Precautions:  Medical Precautions: Fall precautions (L UE RSD/CRPS)    Vital Signs:  Heart Rate: 105    Pain:  Pain Assessment  Pain Location: Back  Pain Orientation: Lower  Objective     Cognition:  Overall Cognitive Status: Within Functional Limits             Home Living:  Home Living Comments: lives with spouse and son, 2 story, bed  and bath on 1st floor. 2 MADELIN with HR. Tub/shower with seat. Amb with quad cane PRN    Prior Function:  Hand Dominance: Right  Prior Function Comments: Pt indep with ADLS, IADLS, including med mgmt           Activities of Daily Living:   Eating Assistance: Independent  Grooming Assistance: Minimal  Bathing Assistance: Minimal  UE Dressing Assistance: Minimal  LE Dressing Assistance: Maximal  Toileting Assistance with Device: Minimal  Functional Assistance:  (pt ambulated 20' in room with CGA with SPC)                         Activity Tolerance:  Endurance: Endurance does not limit participation in activity           Bed Mobility/Transfers: Bed Mobility  Bed Mobility:  (sup to sit SBA)  Transfers  Transfer:  (sit to stand at EOB CGA)                Balance:  Static Sitting: good  Dynamic Sitting: good -  Static Standing: fair   Dynamic Standing: fair          Vision:Vision - Basic Assessment  Current Vision: Wears glasses all the time               Strength:  Strength Comments: B UE 4/5 throughout except L hand 1/5. Pt has functional lateral pinch with L thumb and IF        Extremities: RUE   RUE : Within Functional Limits and LUE   LUE: Within Functional Limits    Outcome Measures: [facility] Daily Activity  Putting on and taking off regular lower body clothing: A little  Bathing (including washing, rinsing, drying): A little  Putting on and taking off regular upper body clothing: A little  Toileting, which includes using toilet, bedpan or urinal: A little  Taking care of personal grooming such as brushing teeth: A little  Eating Meals: None  Daily Activity - Total Score: 19    Education Documentation  ADL Training, taught by Mana Drake OT at 2/8/2024 12:14 PM.  Learner: Patient  Readiness: Acceptance  Method: Explanation  Response: Verbalizes Understanding, Needs Reinforcement          EDUCATION:  Education  Education Comment: educated pt on clamp on grab bar for edge of tub to increase safety and indep with tub  transfers. pt receptive to education, demo good understanding    Goals:  Encounter Problems       Encounter Problems (Active)       OT Goals       Pt will dress LB with SBA (Progressing)       Start:  02/08/24    Expected End:  02/22/24            Pt will demo fair + dyn std balance with ADLS (Progressing)       Start:  02/08/24    Expected End:  02/22/24            Pt will verbalize 3 energy conservation strategies to increase indep with aDLS (Progressing)       Start:  02/08/24    Expected End:  02/22/24            Pt will transfer to bed, chair, toilet with modified indep (Progressing)       Start:  02/08/24    Expected End:  02/22/24

## 2024-02-08 NOTE — PROGRESS NOTES
Physical Therapy    Physical Therapy Evaluation    Patient Name: Daphney Chen  MRN: [id number]  Today's Date: 2/8/2024   Time Calculation  Start Time: 0950  Stop Time: 1017  Time Calculation (min): 27 min    Assessment/Plan   PT Assessment  PT Assessment Results: Decreased strength, Decreased endurance, Impaired balance, Decreased mobility, Decreased safety awareness  Rehab Prognosis: Good  Evaluation/Treatment Tolerance: Patient tolerated treatment well  Assessment Comment: Patient will benefit from additional PT to increase balance and mobility. Trial platform ww  End of Session Patient Position: Bed, 2 rail up, Alarm off, not on at start of session  IP OR SWING BED PT PLAN  Inpatient or Swing Bed: Inpatient  PT Plan  Treatment/Interventions: Transfer training, Gait training, Balance training, Therapeutic activity  PT Plan: Skilled PT  PT Frequency: 3 times per week  PT Discharge Recommendations: No PT needed after discharge  PT Recommended Transfer Status: Assist x1  PT - OK to Discharge: (once deemed medically appropriate)      Subjective   General Visit Information:  General  Reason for Referral: Impaired mobility  Referred By: HLD HTN Depression, DM CAD, GERD, hypothyroidism, oesity, OA, spinal stenosis, PVD, comlex regional pain syndrome, AAA, PAD, TKA, spinal stenosis, OA,  Patient Position Received:  (Sitting up EOB no alarm)  General Comment: To ED 2//7/24 wiht low blood pressure, SOB and lightheadedness. Had appointment at pain management advised to come ot ED secondary to low  blood pressure. COPD Exacerbation, UTI, hypotension. D Dimer 1578; Flu A/B (-); C19(-); cTA 2/7/24 (-) PE, COPD  Home Living:  Home Living  Home Living Comments: Per patient report resides with spouse in 2 leandra home bed/bathroom 1st floor 2 steps with handrial to enter. Owns cane, QC, ww. Tub shower with seat no grab bar. No home oxygen  Prior Level of Function:  Prior Function Per Pt/Caregiver Report  Prior Function Comments:  Per patient report independent in mobilty, ADLs and IADLs with wall walking v. QC. Also owns ww. Denies any falls Drives  Precautions:  Precautions  Medical Precautions: Oxygen therapy device and L/min, Fall precautions  Vital Signs:  Vital Signs  Heart Rate: 105  SpO2: (!) 87 % (87 patient's nasal canula lying on bed  Reapplied nasal canula and educated patient on importance of wearing oxygen to decrease load on body.)    Objective   Pain:  Pain Assessment  Pain Score: 8  Pain Type: Chronic pain  Pain Location:  (LUE, legs, back)  Cognition:  Cognition  Overall Cognitive Status: Within Functional Limits    General Assessments:  General Observation  General Observation: patient sitting up EOB. Agreeable to PT/OT. Note skin and color changes left hand. Well healed scar dorsal aspect of left forearm and volar aspect of hand. Significant atrophy and thumb deformity.   Activity Tolerance  Endurance:  (Limited by respiratory status  and chronic pain back/LEs)    Sensation  Sensation Comment: Patient  describes numbness BLE, BUE especially left UE. Describes sensation as prickly and burning.    Coordination  Movements are Fluid and Coordinated: Yes (lower body)  Finger to Nose: Intact  Alternating Toe Taps: Intact  Heel to [place]: Intact  Coordination Comment: Patient has limited left finger/wrist mobility residual from past IV infiltraion and surgery.    Static Sitting Balance  Static Sitting-Comment/Number of Minutes: Good  Dynamic Sitting Balance  Dynamic Sitting-Comments: Good    Static Standing Balance  Static Standing-Comment/Number of Minutes: Good  Dynamic Standing Balance  Dynamic Standing-Comments: Fair  Functional Assessments:  Bed Mobility  Bed Mobility:  (Supine > sit independent)    Transfers  Transfer:  (Sit <> stand at bed level SBA)    Ambulation/Gait Training  Ambulation/Gait Training Performed:  (Patient ambulated 3' without AD to BSC CGA, Note WBOS and ataxic, antalgic gait. Patient ambulaed 20' with SPC  jake decreased step height and length with increased gluteus medius lurch. Discussed that SPC not appropriate. Would suggest using her QC.)  Extremity/Trunk Assessments:  RUE   RUE : Within Functional Limits  LUE   LUE:  (Shoulder/elbow WFL,forearm WFL, wrist limited extension, finger distal phalanges flexion limited,)  RLE   RLE :  (AROM Hip/knee/ankle WFL MMT 4/5 grossly)  LLE   LLE :  (AROM Hip/knee/ankle WFL MMT 3+/5 grossly)  Outcome Measures:  [facility] Basic Mobility  Turning from your back to your side while in a flat bed without using bedrails: None  Moving from lying on your back to sitting on the side of a flat bed without using bedrails: None  Moving to and from bed to chair (including a wheelchair): A little  Standing up from a chair using your arms (e.g. wheelchair or bedside chair): A little  To walk in hospital room: A little  Climbing 3-5 steps with railing: A little  Basic Mobility - Total Score: 20    Encounter Problems       Encounter Problems (Active)       PT Problem       Patient to ambulate 20' with QC modified independent  (Progressing)       Start:  02/08/24    Expected End:  02/22/24            Transfers sit <> stand independent  (Progressing)       Start:  02/08/24    Expected End:  02/22/24            Patient to ambulate with platform ww LUE 20' modified independent  (Not Progressing)       Start:  02/08/24    Expected End:  02/22/24                   Education Documentation  Mobility Training, taught by Luann García, PT at 2/8/2024 12:21 PM.  Learner: Patient  Readiness: Acceptance  Method: Demonstration, Explanation  Response: Verbalizes Understanding, Demonstrated Understanding  Comment: recommendation of using QC. Discussed option of platform ww

## 2024-02-08 NOTE — NURSING NOTE
Patient transported from ED. Admission complete, vitals and BG complete. Admitting  Notified. Awaiting admitting orders.    Dr. Dietz notified of patient's morning blood glucose level of 531. Awaiting a response in the meantime. Patient currently stable and nonsymptomatic.

## 2024-02-08 NOTE — H&P
History Of Present Illness  Daphney Chen is a 64 y.o. female presenting with low blood pressure.     Patient was at her pain management physician office.  Noted to have low blood pressure.  Referred to the emergency department.  Emergency department states has been having weakness.  Lightheaded.  Short of breath.  Worse with exertion.  Emergency department she was noted to have urinary tract infection.  Also COPD exacerbation.  He was given IV antibiotics and IV steroids.  Admitted to the floor.  However she developed hyperglycemia.  I stopped the steroids.  I am giving her insulin.  She states normally her blood sugars are 130s at home.  Blood pressure has improved.  Pain is controlled.  Breathing is stable.  On examination she has a fine expiratory phase with occasional end expiratory wheeze.  She is alert she is oriented she is not in any distress she sitting up.  Trace edema.  Past medical family surgical social history reviewed.  Medications reconciled.  Objective findings reviewed.    64-year-old female presents emergency department with chief complaint of low blood pressure, shortness of breath, and lightheadedness. Patient reports that she had an appointment with her pain management doctor today and they found her blood pressure to be low and advised her to come here to the emergency department. Patient states that she also was found to have low blood pressure yesterday when she had a biopsy done on my breast lesion. She states she has been feeling lightheaded like she may pass out, but denies any recent falls or head injury. She also states she feels short of breath when exerting herself. No fevers, coughing, or congestion. Patient denies any significant chest pain. No abdominal pain, nausea, vomiting, dysuria, diarrhea, constipation, black or bloody stools. Patient does report worsening lower extremity edema recently. Chart review shows significant past medical history of complex regional pain  "syndrome, peripheral vascular disease, hypertension, diabetes, GERD, hypothyroidism, depression, previous tobacco use, elevated BMI, osteoarthritis, chronic back pain, CAD, and peripheral arterial disease. Denies any illicit drug use or regular alcohol use. Patient is on Plavix and denies any other anticoagulation.   Past Medical History  She has no past medical history on file.    Surgical History  She has a past surgical history that includes CT angio aorta and bilateral iliofemoral runoff w and or wo IV contrast (06/02/2023); Carotid stent; Cardiac surgery; Tonsillectomy; Lumbar laminectomy; Total knee arthroplasty; Anterior compartment decompression; Breast lumpectomy; and Dilation and curettage of uterus.     Social History  She reports that she quit smoking about 4 years ago. Her smoking use included cigarettes. She has never used smokeless tobacco. She reports that she does not currently use alcohol. She reports that she does not use drugs.    Family History  Family History   Problem Relation Name Age of Onset    Diabetes Mother      Hypertension Mother      Heart attack Mother      Heart attack Daughter      Other (blood cancer) Daughter          Allergies  Morphine    Review of Systems     Physical Exam      Last Recorded Vitals  Blood pressure 103/68, pulse 110, temperature 36.3 °C (97.3 °F), resp. rate 16, height 1.727 m (5' 8\"), weight 106 kg (233 lb 0.4 oz), SpO2 90 %.    Relevant Results    Scheduled medications  albuterol, 2 puff, inhalation, q4h  aspirin, 81 mg, oral, Daily  atorvastatin, 80 mg, oral, Nightly  cefTRIAXone, 1 g, intravenous, q24h  ergocalciferol, 50,000 Units, oral, Weekly  gabapentin, 400 mg, oral, TID  insulin regular, 5 Units, subcutaneous, Once  insulin regular, 0-10 Units, subcutaneous, TID with meals  ipratropium-albuteroL, 3 mL, nebulization, q6h  levothyroxine, 137 mcg, oral, Daily  losartan, 50 mg, oral, Daily  melatonin, 10 mg, oral, Nightly  metFORMIN, 1,000 mg, oral, BID " with meals  metoprolol succinate XL, 25 mg, oral, Daily  polyethylene glycol, 17 g, oral, Daily  potassium chloride CR, 20 mEq, oral, Daily  rivaroxaban, 2.5 mg, oral, BID  tiotropium, 2 Inhalation, inhalation, Daily  topiramate, 25 mg, oral, Nightly  torsemide, 40 mg, oral, Daily      Continuous medications  oxygen, , Last Rate: 4.5 L/min (02/08/24 0801)  sodium chloride 0.9%, 75 mL/hr, Last Rate: 75 mL/hr (02/08/24 0028)      PRN medications  PRN medications: acetaminophen **OR** acetaminophen **OR** acetaminophen, acetaminophen **OR** acetaminophen **OR** acetaminophen, dextrose 10 % in water (D10W), dextrose 10 % in water (D10W), dextrose, dextrose, famotidine **OR** famotidine, glucagon, glucagon, naloxone, nitroglycerin, ondansetron **OR** ondansetron, oxyCODONE   Results for orders placed or performed during the hospital encounter of 02/07/24 (from the past 24 hour(s))   CBC and Auto Differential   Result Value Ref Range    WBC 6.0 4.4 - 11.3 x10*3/uL    nRBC 0.0 0.0 - 0.0 /100 WBCs    RBC 4.34 4.00 - 5.20 x10*6/uL    Hemoglobin 12.3 12.0 - 16.0 g/dL    Hematocrit 39.1 36.0 - 46.0 %    MCV 90 80 - 100 fL    MCH 28.3 26.0 - 34.0 pg    MCHC 31.5 (L) 32.0 - 36.0 g/dL    RDW 15.4 (H) 11.5 - 14.5 %    Platelets 220 150 - 450 x10*3/uL    Neutrophils % 68.2 40.0 - 80.0 %    Immature Granulocytes %, Automated 0.3 0.0 - 0.9 %    Lymphocytes % 18.3 13.0 - 44.0 %    Monocytes % 10.7 2.0 - 10.0 %    Eosinophils % 1.3 0.0 - 6.0 %    Basophils % 1.2 0.0 - 2.0 %    Neutrophils Absolute 4.06 1.20 - 7.70 x10*3/uL    Immature Granulocytes Absolute, Automated 0.02 0.00 - 0.70 x10*3/uL    Lymphocytes Absolute 1.09 (L) 1.20 - 4.80 x10*3/uL    Monocytes Absolute 0.64 0.10 - 1.00 x10*3/uL    Eosinophils Absolute 0.08 0.00 - 0.70 x10*3/uL    Basophils Absolute 0.07 0.00 - 0.10 x10*3/uL   Comprehensive Metabolic Panel   Result Value Ref Range    Glucose 485 (HH) 74 - 99 mg/dL    Sodium 133 (L) 136 - 145 mmol/L    Potassium 3.7 3.5 -  5.3 mmol/L    Chloride 97 (L) 98 - 107 mmol/L    Bicarbonate 27 21 - 32 mmol/L    Anion Gap 13 10 - 20 mmol/L    Urea Nitrogen 9 6 - 23 mg/dL    Creatinine 0.94 0.50 - 1.05 mg/dL    eGFR 67 >60 mL/min/1.73m*2    Calcium 8.8 8.6 - 10.3 mg/dL    Albumin 3.3 (L) 3.4 - 5.0 g/dL    Alkaline Phosphatase 127 33 - 136 U/L    Total Protein 6.8 6.4 - 8.2 g/dL    AST 46 (H) 9 - 39 U/L    Bilirubin, Total 0.9 0.0 - 1.2 mg/dL    ALT 35 7 - 45 U/L   Magnesium   Result Value Ref Range    Magnesium 1.67 1.60 - 2.40 mg/dL   Lactate   Result Value Ref Range    Lactate 3.6 (H) 0.4 - 2.0 mmol/L   Protime-INR   Result Value Ref Range    Protime 11.9 9.8 - 12.8 seconds    INR 1.1 0.9 - 1.1   aPTT   Result Value Ref Range    aPTT 31 27 - 38 seconds   B-Type Natriuretic Peptide   Result Value Ref Range    BNP 43 0 - 99 pg/mL   D-Dimer, Quantitative VTE Exclusion   Result Value Ref Range    D-Dimer, Quantitative VTE Exclusion 1,578 (H) <=500 ng/mL FEU   Troponin I, High Sensitivity, Initial   Result Value Ref Range    Troponin I, High Sensitivity 17 (H) 0 - 13 ng/L   Blood Culture    Specimen: Peripheral Venipuncture; Blood culture   Result Value Ref Range    Blood Culture Loaded on Instrument - Culture in progress    Sars-CoV-2 and Influenza A/B PCR   Result Value Ref Range    Flu A Result Not Detected Not Detected    Flu B Result Not Detected Not Detected    Coronavirus 2019, PCR Not Detected Not Detected   ECG 12 lead   Result Value Ref Range    Ventricular Rate 86 BPM    Atrial Rate 86 BPM    DC Interval 144 ms    QRS Duration 82 ms    QT Interval 370 ms    QTC Calculation(Bazett) 442 ms    P Axis 62 degrees    R Axis -19 degrees    T Axis 95 degrees    QRS Count 14 beats    Q Onset 213 ms    P Onset 141 ms    P Offset 204 ms    T Offset 398 ms    QTC Fredericia 417 ms   SST TOP   Result Value Ref Range    Extra Tube Hold for add-ons.    Blood Culture    Specimen: Peripheral Venipuncture; Blood culture   Result Value Ref Range    Blood  Culture Loaded on Instrument - Culture in progress    Urinalysis with Reflex Culture and Microscopic   Result Value Ref Range    Color, Urine Yellow Straw, Yellow    Appearance, Urine Hazy (N) Clear    Specific Gravity, Urine 1.030 1.005 - 1.035    pH, Urine 5.0 5.0, 5.5, 6.0, 6.5, 7.0, 7.5, 8.0    Protein, Urine NEGATIVE NEGATIVE mg/dL    Glucose, Urine >=500 (3+) (A) NEGATIVE mg/dL    Blood, Urine NEGATIVE NEGATIVE    Ketones, Urine NEGATIVE NEGATIVE mg/dL    Bilirubin, Urine NEGATIVE NEGATIVE    Urobilinogen, Urine 2.0 (N) <2.0 mg/dL    Nitrite, Urine POSITIVE (A) NEGATIVE    Leukocyte Esterase, Urine SMALL (1+) (A) NEGATIVE   Extra Urine Gray Tube   Result Value Ref Range    Extra Tube Hold for add-ons.    Microscopic Only, Urine   Result Value Ref Range    WBC, Urine 21-50 (A) 1-5, NONE /HPF    RBC, Urine 1-2 NONE, 1-2, 3-5 /HPF    Squamous Epithelial Cells, Urine 1-9 (SPARSE) Reference range not established. /HPF    Bacteria, Urine 1+ (A) NONE SEEN /HPF   Troponin, High Sensitivity, 1 Hour   Result Value Ref Range    Troponin I, High Sensitivity 17 (H) 0 - 13 ng/L   Lactate   Result Value Ref Range    Lactate 2.3 (H) 0.4 - 2.0 mmol/L   POCT GLUCOSE   Result Value Ref Range    POCT Glucose 310 (H) 74 - 99 mg/dL   POCT GLUCOSE   Result Value Ref Range    POCT Glucose 309 (H) 74 - 99 mg/dL   CBC   Result Value Ref Range    WBC 4.7 4.4 - 11.3 x10*3/uL    nRBC 0.0 0.0 - 0.0 /100 WBCs    RBC 4.40 4.00 - 5.20 x10*6/uL    Hemoglobin 12.4 12.0 - 16.0 g/dL    Hematocrit 40.3 36.0 - 46.0 %    MCV 92 80 - 100 fL    MCH 28.2 26.0 - 34.0 pg    MCHC 30.8 (L) 32.0 - 36.0 g/dL    RDW 15.4 (H) 11.5 - 14.5 %    Platelets 211 150 - 450 x10*3/uL   Comprehensive metabolic panel   Result Value Ref Range    Glucose 531 (HH) 74 - 99 mg/dL    Sodium 135 (L) 136 - 145 mmol/L    Potassium 4.5 3.5 - 5.3 mmol/L    Chloride 99 98 - 107 mmol/L    Bicarbonate 25 21 - 32 mmol/L    Anion Gap 16 10 - 20 mmol/L    Urea Nitrogen 10 6 - 23 mg/dL     Creatinine 0.94 0.50 - 1.05 mg/dL    eGFR 67 >60 mL/min/1.73m*2    Calcium 9.0 8.6 - 10.3 mg/dL    Albumin 3.3 (L) 3.4 - 5.0 g/dL    Alkaline Phosphatase 130 33 - 136 U/L    Total Protein 6.6 6.4 - 8.2 g/dL    AST 43 (H) 9 - 39 U/L    Bilirubin, Total 0.6 0.0 - 1.2 mg/dL    ALT 38 7 - 45 U/L   SST TOP   Result Value Ref Range    Extra Tube Hold for add-ons.    POCT GLUCOSE   Result Value Ref Range    POCT Glucose 465 (H) 74 - 99 mg/dL      ECG 12 lead    Result Date: 2/7/2024  Normal sinus rhythm Left ventricular hypertrophy with repolarization abnormality Inferior infarct (cited on or before 07-FEB-2024) Abnormal ECG When compared with ECG of 07-FEB-2024 16:00, (unconfirmed) No significant change was found See ED provider note for full interpretation and clinical correlation Confirmed by Osiel Marshall ([id number]) on 2/7/2024 10:53:11 PM    CT angio chest for pulmonary embolism    Result Date: 2/7/2024  STUDY: CT Angiogram of the Chest; 02/07/2024 at 7:14 PM INDICATION: Shortness of breath, lightheadedness. Elevated D-dimer. Evaluate for pulmonary embolism. COMPARISON: XR chest of same date, 02/07/24. CTA chest 02/01/23. CTA CAP 07/27/23. ACCESSION NUMBER(S): [id number] ORDERING CLINICIAN: RODRIGO PUTNAM TECHNIQUE:  CTA of the chest was performed with intravenous contrast. Images are reviewed and processed at a workstation according to the CT angiogram protocol with 3-D and/or MIP post processing imaging generated.  Omnipaque-350 75 mL was administered intravenously. Automated mA/kV exposure control was utilized and patient examination was performed in strict accordance with principles of ALARA. FINDINGS: There are no detectable pulmonary emboli. The pulmonary arteries are normal caliber. The heart is normal overall size with minimal LV dilatation. There are minimal coronary artery calcified lesions. There is no thoracic aortic aneurysm or dissection. There is minimal atherosclerotic thrombus in the mid descending  thoracic aorta. Small lymph nodes are seen in the mediastinum. There is no bulky lymphadenopathy or focal mass. There is no pericardial effusion. The lungs demonstrate minimal to moderate upper lobe-predominant centrilobular emphysema, in addition to minimal bullous emphysema at the left lung base. There is minimal biapical scarring, with additional focal scarring or atelectasis in the lung bases. There are no pleural effusions. There is no pneumothorax. Previous extensive surgery is seen in the lower thoracic and lumbar spine, with spinal rods in place. Rib cage appears intact. Upper abdomen suggests subtle cirrhotic morphology in the liver.    1. No detectable pulmonary emboli. 2. COPD; no infiltrates or vascular congestion. 3. Remainder as above. Signed by Micheal Diop MD    XR chest 1 view    Result Date: 2/7/2024  STUDY: Chest Radiograph;  2/7/2024 at 4:59 PM. INDICATION: Shortness of breath. COMPARISON: CTA CAP 7/27/2023; XR chest 6/24/2023, 3/1/2023; CTA chest 2/1/2023. ACCESSION NUMBER(S): [id number] ORDERING CLINICIAN: RODRIGO PUTNAM TECHNIQUE:  Frontal chest was obtained at 16:59 hours (two images). FINDINGS: CARDIOMEDIASTINAL SILHOUETTE: Cardiomediastinal silhouette is normal in size and configuration.  LUNGS: Lungs are clear.  ABDOMEN: No remarkable upper abdominal findings.  BONES: No acute osseous changes.    No acute pulmonary pathology. Signed by Tra Garcia MD    BI US guided breast localization and biopsy right    Result Date: 2/6/2024  Interpreted By:  Nathaniel Singh, STUDY: BI US GUIDED BREAST LOCALIZATION AND BIOPSY RIGHT; BI BREAST BIOPSY CLIP IMAGING;  2/6/2024 2:00 pm   INDICATION: Signs/Symptoms:right breast mass.   COMPARISON: 01/29/2024   ACCESSION NUMBER(S): [id number]; [id number]   ORDERING CLINICIAN: NATALIO CUNNINGHAM   TECHNIQUE: ULTRASOUND GUIDED, VACUUM ASSISTED MAMMOTOME BIOPSY OF THE BREAST   The history and physical exam pertinent to the procedure were reviewed and no updates  were made.   After sonographic evaluation, the lesion was felt to be accessible to sonographic-guided biopsy.  The procedure was discussed with the patient.  Possible complications, such as infection, hematoma, and inconclusive biopsy results necessitating further biopsy or surgery were discussed.  Alternative procedures of needle localization and surgical extraction were also discussed.  The patient understands this is a sampling procedure and agrees to proceed.  Informed consent was obtained.   TECHNIQUE:  Under sterile technique, a local anesthetic was applied to the skin, initially, and then to the deeper soft tissues down to the depth of the lesion.  Through a small nick, a  12 gauge Suros probe was inserted and then advanced with ultrasound guidance to the site of biopsy; just along the posterior margin of the lesion in question, at the 2 o'clock position, 2 cm from the edge of the nipple. Multiple vacuum-assisted core specimens were obtained. The probe was then removed and hemostasis was achieved with direct compression.   A metallic localization clip was left in place at the biopsy site for future reference.  No immediate complications were encountered.   FINDINGS: Recent images of the  right breast were reviewed.   POST BIOPSY MAMMOGRAM:  After the procedure, the wound was properly dressed and a  right sided mammogram consisting of two films was obtained.  The site of biopsy and successful deployment of the localization clip was confirmed.   FOLLOW UP: The patient was given appropriate discharge instructions covering the next 48 hours.  The patient was also instructed to follow up with Dr. Pereira for the results of the biopsy.  If she does not receive results within the next one to two weeks, she was instructed to contact her doctor's office.       TECHNICALLY SUCCESSFUL ULTRASOUND GUIDED BREAST BIOPSY.  AFTER REVIEWING THE PATHOLOGY REPORT AND CORRELATING WITH IMAGING STUDIES, AN ADDENDUM TO THIS REPORT  WILL BE ISSUED.   MACRO: None.   Signed by: Nathaniel Singh 2/6/2024 5:02 PM Dictation workstation:   [id number]    BI breast biopsy clip imaging    Result Date: 2/6/2024  Interpreted By:  Nathaniel Singh, STUDY: BI US GUIDED BREAST LOCALIZATION AND BIOPSY RIGHT; BI BREAST BIOPSY CLIP IMAGING;  2/6/2024 2:00 pm   INDICATION: Signs/Symptoms:right breast mass.   COMPARISON: 01/29/2024   ACCESSION NUMBER(S): [id number]; [id number]   ORDERING CLINICIAN: NATALIO CUNNINGHAM   TECHNIQUE: ULTRASOUND GUIDED, VACUUM ASSISTED MAMMOTOME BIOPSY OF THE BREAST   The history and physical exam pertinent to the procedure were reviewed and no updates were made.   After sonographic evaluation, the lesion was felt to be accessible to sonographic-guided biopsy.  The procedure was discussed with the patient.  Possible complications, such as infection, hematoma, and inconclusive biopsy results necessitating further biopsy or surgery were discussed.  Alternative procedures of needle localization and surgical extraction were also discussed.  The patient understands this is a sampling procedure and agrees to proceed.  Informed consent was obtained.   TECHNIQUE:  Under sterile technique, a local anesthetic was applied to the skin, initially, and then to the deeper soft tissues down to the depth of the lesion.  Through a small nick, a  12 gauge Suros probe was inserted and then advanced with ultrasound guidance to the site of biopsy; just along the posterior margin of the lesion in question, at the 2 o'clock position, 2 cm from the edge of the nipple. Multiple vacuum-assisted core specimens were obtained. The probe was then removed and hemostasis was achieved with direct compression.   A metallic localization clip was left in place at the biopsy site for future reference.  No immediate complications were encountered.   FINDINGS: Recent images of the  right breast were reviewed.   POST BIOPSY MAMMOGRAM:  After the procedure, the wound was properly  dressed and a  right sided mammogram consisting of two films was obtained.  The site of biopsy and successful deployment of the localization clip was confirmed.   FOLLOW UP: The patient was given appropriate discharge instructions covering the next 48 hours.  The patient was also instructed to follow up with Dr. Pereira for the results of the biopsy.  If she does not receive results within the next one to two weeks, she was instructed to contact her doctor's office.       TECHNICALLY SUCCESSFUL ULTRASOUND GUIDED BREAST BIOPSY.  AFTER REVIEWING THE PATHOLOGY REPORT AND CORRELATING WITH IMAGING STUDIES, AN ADDENDUM TO THIS REPORT WILL BE ISSUED.   MACRO: None.   Signed by: Nathaniel Singh 2/6/2024 5:02 PM Dictation workstation:   [id number]    BI US breast limited right    Result Date: 1/29/2024  Interpreted By:  Nathaniel Singh, STUDY: BI US BREAST LIMITED RIGHT;  1/29/2024 2:31 pm   ACCESSION NUMBER(S): [id number]   ORDERING CLINICIAN: ZACH FERNANDEZ   INDICATION: Signs/Symptoms:abnormal Mammogram.   COMPARISON: Screening mammogram dated 12/11/2023   FINDINGS: Targeted sonographic imaging of the right breast and right axilla was performed by a registered sonographer and the images were submitted for interpretation.   Within the right breast, at the 2 o'clock position, 2 cm from the nipple, there is a ovoid circumscribed hypoechoic solid mass measuring 1.5 x 0.6 x 1.1 cm. There is no increased through transmission or posterior shadowing. This is nonspecific and may represent a fibroadenoma but biopsy is recommended for confirmation.   Within the right axilla, there are at least 3 lymph nodes which are not pathologically enlarged by size criteria, maintain their fatty yonathan, and demonstrate a normal cortical thickness.       Solid mass within the right breast at the 2 o'clock position. Ultrasound-guided biopsy is recommended.   BI-RADS CATEGORY:   BI-RADS Category:  4 Suspicious. Recommendation:  Biopsy. Recommended Date:   Immediate. Laterality:  Right.   For any future breast imaging appointments, please call [phone]).     MACRO: None   Signed by: Nathaniel Singh 1/29/2024 3:54 PM Dictation workstation:   [id number]    ECG 12 lead    Result Date: 2/7/2024  Normal sinus rhythm Left ventricular hypertrophy with repolarization abnormality Inferior infarct (cited on or before 07-FEB-2024) Abnormal ECG When compared with ECG of 07-FEB-2024 16:00, (unconfirmed) No significant change was found See ED provider note for full interpretation and clinical correlation Confirmed by Osiel Marshall ([id number]) on 2/7/2024 10:53:11 PM    CT angio chest for pulmonary embolism    Result Date: 2/7/2024  STUDY: CT Angiogram of the Chest; 02/07/2024 at 7:14 PM INDICATION: Shortness of breath, lightheadedness. Elevated D-dimer. Evaluate for pulmonary embolism. COMPARISON: XR chest of same date, 02/07/24. CTA chest 02/01/23. CTA CAP 07/27/23. ACCESSION NUMBER(S): [id number] ORDERING CLINICIAN: RODRIGO PUTNAM TECHNIQUE:  CTA of the chest was performed with intravenous contrast. Images are reviewed and processed at a workstation according to the CT angiogram protocol with 3-D and/or MIP post processing imaging generated.  Omnipaque-350 75 mL was administered intravenously. Automated mA/kV exposure control was utilized and patient examination was performed in strict accordance with principles of ALARA. FINDINGS: There are no detectable pulmonary emboli. The pulmonary arteries are normal caliber. The heart is normal overall size with minimal LV dilatation. There are minimal coronary artery calcified lesions. There is no thoracic aortic aneurysm or dissection. There is minimal atherosclerotic thrombus in the mid descending thoracic aorta. Small lymph nodes are seen in the mediastinum. There is no bulky lymphadenopathy or focal mass. There is no pericardial effusion. The lungs demonstrate minimal to moderate upper lobe-predominant centrilobular emphysema,  in addition to minimal bullous emphysema at the left lung base. There is minimal biapical scarring, with additional focal scarring or atelectasis in the lung bases. There are no pleural effusions. There is no pneumothorax. Previous extensive surgery is seen in the lower thoracic and lumbar spine, with spinal rods in place. Rib cage appears intact. Upper abdomen suggests subtle cirrhotic morphology in the liver.    1. No detectable pulmonary emboli. 2. COPD; no infiltrates or vascular congestion. 3. Remainder as above. Signed by Micheal Diop MD    XR chest 1 view    Result Date: 2/7/2024  STUDY: Chest Radiograph;  2/7/2024 at 4:59 PM. INDICATION: Shortness of breath. COMPARISON: CTA CAP 7/27/2023; XR chest 6/24/2023, 3/1/2023; CTA chest 2/1/2023. ACCESSION NUMBER(S): [id number] ORDERING CLINICIAN: RODRIGO PUTNAM TECHNIQUE:  Frontal chest was obtained at 16:59 hours (two images). FINDINGS: CARDIOMEDIASTINAL SILHOUETTE: Cardiomediastinal silhouette is normal in size and configuration.  LUNGS: Lungs are clear.  ABDOMEN: No remarkable upper abdominal findings.  BONES: No acute osseous changes.    No acute pulmonary pathology. Signed by Tra Gracia MD           Assessment/Plan   Principal Problem:    Hypotension, unspecified hypotension type  Active Problems:    PVD (peripheral vascular disease) (CMS/HCC)    Primary hypertension    Combined systolic and diastolic cardiac dysfunction    Gastroesophageal reflux disease without esophagitis    Hypothyroidism    Major depressive disorder, single episode, moderate (CMS/HCC)    Osteoarthrosis    CAD (coronary artery disease)    Hypertension associated with type 2 diabetes mellitus (CMS/HCC)    PAD (peripheral artery disease) (CMS/HCC)    Subclavian artery stenosis, left (CMS/HCC)      Principal Problem:    Hypotension, unspecified hypotension type  Active Problems:    PVD (peripheral vascular disease) (CMS/HCC)    Primary hypertension    Combined systolic and diastolic  cardiac dysfunction    Gastroesophageal reflux disease without esophagitis    Hypothyroidism    Major depressive disorder, single episode, moderate (CMS/HCC)    Osteoarthrosis    CAD (coronary artery disease)    Hypertension associated with type 2 diabetes mellitus (CMS/HCC)    PAD (peripheral artery disease) (CMS/HCC)    Subclavian artery stenosis, left (CMS/HCC)      She is given fluid resuscitation and her blood pressure does seem fluid responsive. Given her presenting complaints a thorough workup was obtained. UA is concerning for urinary tract infection. D-dimer is elevated but subsequent CT PE study does not show any pulmonary embolus. She does have findings of COPD. Patient treated for COPD exacerbation with aerosol treatment, Solu-Medrol. CBC does not show significant leukocytosis or anemia. Cultures are obtained given patient's hypotension and findings of UTI. Cardiac enzymes x 2 are mildly elevated, but not significantly increasing. EKG does not show acute ACS or significant arrhythmia. CMP shows hyperglycemia which does improve with fluid bolus. Patient does not have findings of DKA. Flu and COVID testing are negative. BNP is within normal range making CHF less likely. Chest x-ray shows no acute cardiopulmonary process such as pneumonia, pleural effusion, pulmonary edema.    For now we will stop the steroids.  Respiratory status is stable.  Continue nebulizer treatments.  Blood pressure improved.  Will treat urinary tract infection with antibiotic.  Will give additional send  The burning blood sugar down.  Patient already has an appointment this coming Monday with her cardiologist.  At this point    I spent   minutes in the professional and overall care of this patient.      Tommy Dietz MD

## 2024-02-09 LAB
ANION GAP SERPL CALC-SCNC: 10 MMOL/L (ref 10–20)
BUN SERPL-MCNC: 18 MG/DL (ref 6–23)
CALCIUM SERPL-MCNC: 8.4 MG/DL (ref 8.6–10.3)
CHLORIDE SERPL-SCNC: 102 MMOL/L (ref 98–107)
CO2 SERPL-SCNC: 29 MMOL/L (ref 21–32)
CREAT SERPL-MCNC: 0.86 MG/DL (ref 0.5–1.05)
EGFRCR SERPLBLD CKD-EPI 2021: 75 ML/MIN/1.73M*2
GLUCOSE BLD MANUAL STRIP-MCNC: 244 MG/DL (ref 74–99)
GLUCOSE BLD MANUAL STRIP-MCNC: 264 MG/DL (ref 74–99)
GLUCOSE BLD MANUAL STRIP-MCNC: 280 MG/DL (ref 74–99)
GLUCOSE BLD MANUAL STRIP-MCNC: 333 MG/DL (ref 74–99)
GLUCOSE SERPL-MCNC: 251 MG/DL (ref 74–99)
HOLD SPECIMEN: NORMAL
LABORATORY COMMENT REPORT: NORMAL
LACTATE SERPL-SCNC: 2.2 MMOL/L (ref 0.4–2)
LACTATE SERPL-SCNC: 3.2 MMOL/L (ref 0.4–2)
PATH REPORT.FINAL DX SPEC: NORMAL
PATH REPORT.GROSS SPEC: NORMAL
PATH REPORT.RELEVANT HX SPEC: NORMAL
PATH REPORT.TOTAL CANCER: NORMAL
POTASSIUM SERPL-SCNC: 3.8 MMOL/L (ref 3.5–5.3)
SODIUM SERPL-SCNC: 137 MMOL/L (ref 136–145)

## 2024-02-09 PROCEDURE — 2500000004 HC RX 250 GENERAL PHARMACY W/ HCPCS (ALT 636 FOR OP/ED): Performed by: NURSE PRACTITIONER

## 2024-02-09 PROCEDURE — 80048 BASIC METABOLIC PNL TOTAL CA: CPT | Performed by: NURSE PRACTITIONER

## 2024-02-09 PROCEDURE — 94640 AIRWAY INHALATION TREATMENT: CPT

## 2024-02-09 PROCEDURE — 83605 ASSAY OF LACTIC ACID: CPT | Performed by: NURSE PRACTITIONER

## 2024-02-09 PROCEDURE — 83605 ASSAY OF LACTIC ACID: CPT

## 2024-02-09 PROCEDURE — 2500000001 HC RX 250 WO HCPCS SELF ADMINISTERED DRUGS (ALT 637 FOR MEDICARE OP): Performed by: FAMILY MEDICINE

## 2024-02-09 PROCEDURE — 1210000001 HC SEMI-PRIVATE ROOM DAILY

## 2024-02-09 PROCEDURE — 82947 ASSAY GLUCOSE BLOOD QUANT: CPT

## 2024-02-09 PROCEDURE — 99233 SBSQ HOSP IP/OBS HIGH 50: CPT | Performed by: FAMILY MEDICINE

## 2024-02-09 PROCEDURE — 99223 1ST HOSP IP/OBS HIGH 75: CPT | Performed by: INTERNAL MEDICINE

## 2024-02-09 PROCEDURE — 2500000004 HC RX 250 GENERAL PHARMACY W/ HCPCS (ALT 636 FOR OP/ED): Performed by: FAMILY MEDICINE

## 2024-02-09 PROCEDURE — 97116 GAIT TRAINING THERAPY: CPT | Mod: GP,CQ | Performed by: PHYSICAL THERAPY ASSISTANT

## 2024-02-09 PROCEDURE — 2500000004 HC RX 250 GENERAL PHARMACY W/ HCPCS (ALT 636 FOR OP/ED)

## 2024-02-09 PROCEDURE — 99221 1ST HOSP IP/OBS SF/LOW 40: CPT

## 2024-02-09 PROCEDURE — 36415 COLL VENOUS BLD VENIPUNCTURE: CPT | Performed by: NURSE PRACTITIONER

## 2024-02-09 PROCEDURE — 36415 COLL VENOUS BLD VENIPUNCTURE: CPT

## 2024-02-09 PROCEDURE — 2500000002 HC RX 250 W HCPCS SELF ADMINISTERED DRUGS (ALT 637 FOR MEDICARE OP, ALT 636 FOR OP/ED): Performed by: FAMILY MEDICINE

## 2024-02-09 RX ORDER — IPRATROPIUM BROMIDE AND ALBUTEROL SULFATE 2.5; .5 MG/3ML; MG/3ML
3 SOLUTION RESPIRATORY (INHALATION) EVERY 2 HOUR PRN
Status: DISCONTINUED | OUTPATIENT
Start: 2024-02-09 | End: 2024-02-13 | Stop reason: HOSPADM

## 2024-02-09 RX ORDER — METOPROLOL SUCCINATE 25 MG/1
25 TABLET, EXTENDED RELEASE ORAL DAILY
Status: CANCELLED | OUTPATIENT
Start: 2024-02-10

## 2024-02-09 RX ORDER — IPRATROPIUM BROMIDE AND ALBUTEROL SULFATE 2.5; .5 MG/3ML; MG/3ML
3 SOLUTION RESPIRATORY (INHALATION) 3 TIMES DAILY
Status: DISCONTINUED | OUTPATIENT
Start: 2024-02-10 | End: 2024-02-12

## 2024-02-09 RX ORDER — SODIUM CHLORIDE, SODIUM LACTATE, POTASSIUM CHLORIDE, CALCIUM CHLORIDE 600; 310; 30; 20 MG/100ML; MG/100ML; MG/100ML; MG/100ML
75 INJECTION, SOLUTION INTRAVENOUS CONTINUOUS
Status: DISCONTINUED | OUTPATIENT
Start: 2024-02-09 | End: 2024-02-09

## 2024-02-09 RX ORDER — LOSARTAN POTASSIUM 50 MG/1
50 TABLET ORAL DAILY
Status: CANCELLED | OUTPATIENT
Start: 2024-02-10

## 2024-02-09 RX ADMIN — ALBUTEROL SULFATE 2 PUFF: 90 AEROSOL, METERED RESPIRATORY (INHALATION) at 20:14

## 2024-02-09 RX ADMIN — IPRATROPIUM BROMIDE AND ALBUTEROL SULFATE 3 ML: 2.5; .5 SOLUTION RESPIRATORY (INHALATION) at 06:49

## 2024-02-09 RX ADMIN — INSULIN HUMAN 4 UNITS: 100 INJECTION, SOLUTION PARENTERAL at 18:16

## 2024-02-09 RX ADMIN — ASPIRIN 81 MG: 81 TABLET, CHEWABLE ORAL at 09:35

## 2024-02-09 RX ADMIN — METFORMIN HYDROCHLORIDE 1000 MG: 500 TABLET, FILM COATED ORAL at 17:55

## 2024-02-09 RX ADMIN — OXYCODONE HYDROCHLORIDE 15 MG: 5 TABLET ORAL at 03:38

## 2024-02-09 RX ADMIN — RIVAROXABAN 2.5 MG: 2.5 TABLET, FILM COATED ORAL at 20:16

## 2024-02-09 RX ADMIN — SODIUM CHLORIDE, POTASSIUM CHLORIDE, SODIUM LACTATE AND CALCIUM CHLORIDE 500 ML: 600; 310; 30; 20 INJECTION, SOLUTION INTRAVENOUS at 14:25

## 2024-02-09 RX ADMIN — Medication 10 MG: at 20:12

## 2024-02-09 RX ADMIN — SODIUM CHLORIDE, POTASSIUM CHLORIDE, SODIUM LACTATE AND CALCIUM CHLORIDE 1000 ML: 600; 310; 30; 20 INJECTION, SOLUTION INTRAVENOUS at 18:15

## 2024-02-09 RX ADMIN — IPRATROPIUM BROMIDE AND ALBUTEROL SULFATE 3 ML: 2.5; .5 SOLUTION RESPIRATORY (INHALATION) at 12:34

## 2024-02-09 RX ADMIN — ALBUTEROL SULFATE 2 PUFF: 90 AEROSOL, METERED RESPIRATORY (INHALATION) at 09:16

## 2024-02-09 RX ADMIN — INSULIN HUMAN 8 UNITS: 100 INJECTION, SOLUTION PARENTERAL at 09:17

## 2024-02-09 RX ADMIN — ATORVASTATIN CALCIUM 80 MG: 80 TABLET, FILM COATED ORAL at 20:12

## 2024-02-09 RX ADMIN — GABAPENTIN 400 MG: 400 CAPSULE ORAL at 20:11

## 2024-02-09 RX ADMIN — IPRATROPIUM BROMIDE AND ALBUTEROL SULFATE 3 ML: 2.5; .5 SOLUTION RESPIRATORY (INHALATION) at 19:38

## 2024-02-09 RX ADMIN — INSULIN HUMAN 6 UNITS: 100 INJECTION, SOLUTION PARENTERAL at 11:40

## 2024-02-09 RX ADMIN — CEFTRIAXONE SODIUM 1 G: 1 INJECTION, SOLUTION INTRAVENOUS at 17:55

## 2024-02-09 RX ADMIN — LEVOTHYROXINE SODIUM 137 MCG: 25 TABLET ORAL at 05:46

## 2024-02-09 RX ADMIN — RIVAROXABAN 2.5 MG: 2.5 TABLET, FILM COATED ORAL at 11:39

## 2024-02-09 RX ADMIN — TOPIRAMATE 25 MG: 25 TABLET ORAL at 20:11

## 2024-02-09 RX ADMIN — OXYCODONE HYDROCHLORIDE 15 MG: 5 TABLET ORAL at 20:11

## 2024-02-09 RX ADMIN — TIOTROPIUM BROMIDE INHALATION SPRAY 2 PUFF: 3.12 SPRAY, METERED RESPIRATORY (INHALATION) at 09:17

## 2024-02-09 RX ADMIN — METFORMIN HYDROCHLORIDE 1000 MG: 500 TABLET, FILM COATED ORAL at 09:35

## 2024-02-09 RX ADMIN — SODIUM CHLORIDE 75 ML/HR: 9 INJECTION, SOLUTION INTRAVENOUS at 09:35

## 2024-02-09 RX ADMIN — IPRATROPIUM BROMIDE AND ALBUTEROL SULFATE 3 ML: 2.5; .5 SOLUTION RESPIRATORY (INHALATION) at 01:41

## 2024-02-09 RX ADMIN — SODIUM CHLORIDE 500 ML: 9 INJECTION, SOLUTION INTRAVENOUS at 09:16

## 2024-02-09 RX ADMIN — GABAPENTIN 400 MG: 400 CAPSULE ORAL at 09:35

## 2024-02-09 RX ADMIN — ALBUTEROL SULFATE 2 PUFF: 90 AEROSOL, METERED RESPIRATORY (INHALATION) at 11:40

## 2024-02-09 RX ADMIN — POTASSIUM CHLORIDE 20 MEQ: 1500 TABLET, EXTENDED RELEASE ORAL at 09:35

## 2024-02-09 ASSESSMENT — PAIN SCALES - GENERAL
PAINLEVEL_OUTOF10: 0 - NO PAIN
PAINLEVEL_OUTOF10: 4
PAINLEVEL_OUTOF10: 0 - NO PAIN
PAINLEVEL_OUTOF10: 6
PAINLEVEL_OUTOF10: 7
PAINLEVEL_OUTOF10: 0 - NO PAIN

## 2024-02-09 ASSESSMENT — PAIN - FUNCTIONAL ASSESSMENT
PAIN_FUNCTIONAL_ASSESSMENT: 0-10

## 2024-02-09 ASSESSMENT — COGNITIVE AND FUNCTIONAL STATUS - GENERAL
MOBILITY SCORE: 20
DAILY ACTIVITIY SCORE: 19
MOBILITY SCORE: 18
TURNING FROM BACK TO SIDE WHILE IN FLAT BAD: A LITTLE
STANDING UP FROM CHAIR USING ARMS: A LITTLE
MOVING TO AND FROM BED TO CHAIR: A LITTLE
MOVING FROM LYING ON BACK TO SITTING ON SIDE OF FLAT BED WITH BEDRAILS: A LITTLE
MOVING TO AND FROM BED TO CHAIR: A LITTLE
STANDING UP FROM CHAIR USING ARMS: A LITTLE
PERSONAL GROOMING: A LITTLE
HELP NEEDED FOR BATHING: A LITTLE
DRESSING REGULAR LOWER BODY CLOTHING: A LITTLE
WALKING IN HOSPITAL ROOM: A LITTLE
TOILETING: A LITTLE
CLIMB 3 TO 5 STEPS WITH RAILING: A LITTLE
DRESSING REGULAR UPPER BODY CLOTHING: A LITTLE
WALKING IN HOSPITAL ROOM: A LITTLE
CLIMB 3 TO 5 STEPS WITH RAILING: A LITTLE

## 2024-02-09 ASSESSMENT — PAIN SCALES - WONG BAKER
WONGBAKER_NUMERICALRESPONSE: HURTS WHOLE LOT
WONGBAKER_NUMERICALRESPONSE: NO HURT

## 2024-02-09 NOTE — PROGRESS NOTES
"Physical Therapy    Physical Therapy Treatment    Patient Name: Daphney Chen  MRN: [id number]  Today's Date: 2/9/2024  Time Calculation  Start Time: 0825  Stop Time: 0847  Time Calculation (min): 22 min       Assessment/Plan   PT Assessment  End of Session Communication: Bedside nurse  End of Session Patient Position: Bed, 3 rail up     PT Plan  Treatment/Interventions: Transfer training, Gait training  PT Plan: Skilled PT  PT Frequency: 3 times per week  General Visit Information:   PT  Visit  PT Received On: 02/09/24  General  Prior to Session Communication: Bedside nurse  Patient Position Received: Bed, 3 rail up  General Comment:  (Pt. agreeable to participate but requensting ot return to supine with c/o \"I didn't sleep\")    Subjective   Precautions:  Precautions  Precautions Comment:  (Falls, O2, medical)  Vital Signs:  Vital Signs  Heart Rate: 79 (90 after activity)  Heart Rate Source: Monitor  SpO2: 92 % (5LO2 per nc)  BP: 94/63 (seated edge of bed then BP dropped to 70/49 after gait training, RN informed.)  Patient Position: Sitting    Objective   Pain:  Pain Assessment  Pain Assessment: 0-10  Pain Score: 7  Pain Type:  (LLE with gait and also c/o lbp)  Pain Interventions:  (returned to supine for comfort)     Treatments:    Ambulation/Gait Training  Ambulation/Gait Training Performed:  (gait training with wbqc and cg/min slow antalgic appearing unsteady but controlled gait step to pattern with wbos performing 15-20' distances.)  Transfers  Transfer:  (supine to sit with mod ind.sit to stand with close cg and positioning instructions.)    Outcome Measures:  [facility] Basic Mobility  Turning from your back to your side while in a flat bed without using bedrails: A little  Moving from lying on your back to sitting on the side of a flat bed without using bedrails: A little  Moving to and from bed to chair (including a wheelchair): A little  Standing up from a chair using your arms (e.g. wheelchair or bedside " chair): A little  To walk in hospital room: A little  Climbing 3-5 steps with railing: A little  Basic Mobility - Total Score: 18    EDUCATION:  Outpatient Education  Individual(s) Educated: Patient  Education Provided: Body Mechanics, Posture (educated on PT role in care and importance of OOB activity)  Patient Response to Education: Patient/Caregiver Verbalized Understanding of Information    Encounter Problems       Encounter Problems (Active)       PT Problem       Patient to ambulate 20' with QC modified independent  (Progressing)       Start:  02/08/24    Expected End:  02/22/24            Transfers sit <> stand independent  (Progressing)       Start:  02/08/24    Expected End:  02/22/24            Patient to ambulate with platform ww ANGELA 20' modified independent  (Progressing)       Start:  02/08/24    Expected End:  02/22/24

## 2024-02-09 NOTE — PROGRESS NOTES
Occupational Therapy                 Therapy Communication Note    Patient Name: Daphney Chen  MRN: [id number]  Today's Date: 2/9/2024     Discipline: Occupational Therapy    Missed Visit Reason:  OT spoke with patient's RN.  BP 61/38.  RN attempting to contact physician.  OT treatment held.  Reattempt as appropriate.     Missed Time: Attempt 13:57    Comment:

## 2024-02-09 NOTE — CONSULTS
Inpatient consult to Cardiology  Consult performed by: TIMUR Osborn-CNP  Consult ordered by: Tommy Dietz MD  Reason for consult: hypotension                            Date:   2/9/2024  Patient name:  Daphney Chen  Date of admission:  2/7/2024  3:08 PM  MRN:   [id number]  YOB: 1960  Time of Consult:  9:57 AM    Consulting Cardiologist: TIMUR Novak, CNP  Primary Cardiologist:  Dr. Ronald Wellington    Referring Provider: Dr. Dietz      Admission Diagnosis:     Hypotension, unspecified hypotension type      History of Present Illness:     64-year-old female with past medical history of bifemoral bypass with ringed PTFE in 2023 secondary to infrarenal aortic occlusion causing Wells class IV critical limb ischemia, carotid artery and subclavian vein stenosis, hypertension, hyperlipidemia, CAD status post stent to the RCA in 2023, AAA, PAD, LFA compartment syndrome due to infiltrated IV requiring release, COPD, non-insulin-dependent diabetes mellitus type 2, hypothyroidism, neuropathy and chronic back pain on opioids who presented to [facility] emergency department 2 days ago after being at her pain management clinic and noticing her blood pressure to be on the low side.  EKG in the emergency department showed normal sinus rhythm with a rate of 86 with no ST wave abnormalities.  Blood pressure was quite low in the emergency department.  She was bolused with IV fluids and seem to fluid responsive.  UA was concerning for urinary tract infection which she was placed on antibiotics per primary team.  She does have underlying COPD but does not wear home O2.  Cardiac enzymes were mildly elevated but patient denies any chest pain.  Flu and COVID testings were negative.  BNP was normal.  This x-ray showed no acute cardiopulmonary process such as pneumonia, pleural effusion or pulmonary edema.  She was admitted to the medicine  team and general cardiology was consulted today for hypotension.        Allergies:     Allergies   Allergen Reactions    Morphine Itching and Rash     Feels like bugs are crawling on her         Past Medical History:     See above    Past Surgical History:     Past Surgical History:   Procedure Laterality Date    ANTERIOR COMPARTMENT DECOMPRESSION      BREAST LUMPECTOMY      CARDIAC SURGERY      CAROTID STENT      CT AORTA AND BILATERAL ILIOFEMORAL RUNOFF ANGIOGRAM W AND/OR WO IV CONTRAST  2023    CT AORTA AND BILATERAL ILIOFEMORAL RUNOFF ANGIOGRAM W AND/OR WO IV CONTRAST 2023 [facility]J CT    DILATION AND CURETTAGE OF UTERUS      LUMBAR LAMINECTOMY      TONSILLECTOMY      TOTAL KNEE ARTHROPLASTY         Family History:     Family History   Problem Relation Name Age of Onset    Diabetes Mother      Hypertension Mother      Heart attack Mother      Heart attack Daughter      Other (blood cancer) Daughter         Social History:     Social History     Tobacco Use    Smoking status: Former     Types: Cigarettes     Quit date:      Years since quittin.1    Smokeless tobacco: Never   Vaping Use    Vaping Use: Never used   Substance Use Topics    Alcohol use: Not Currently    Drug use: Never       CURRENT INPATIENT MEDICATIONS    albuterol, 2 puff, inhalation, q4h  aspirin, 81 mg, oral, Daily  atorvastatin, 80 mg, oral, Nightly  cefTRIAXone, 1 g, intravenous, q24h  ergocalciferol, 50,000 Units, oral, Weekly  gabapentin, 400 mg, oral, TID  insulin regular, 0-10 Units, subcutaneous, TID with meals  ipratropium-albuteroL, 3 mL, nebulization, q6h  levothyroxine, 137 mcg, oral, Daily  [Held by provider] losartan, 50 mg, oral, Daily  melatonin, 10 mg, oral, Nightly  metFORMIN, 1,000 mg, oral, BID with meals  [Held by provider] metoprolol succinate XL, 25 mg, oral, Daily  polyethylene glycol, 17 g, oral, Daily  potassium chloride CR, 20 mEq, oral, Daily  rivaroxaban, 2.5 mg, oral, BID  sodium chloride, 500 mL,  intravenous, Once  tiotropium, 2 Inhalation, inhalation, Daily  topiramate, 25 mg, oral, Nightly  [Held by provider] torsemide, 40 mg, oral, Daily      oxygen, , Last Rate: 4 L/min (02/09/24 0649)  sodium chloride 0.9%, 75 mL/hr, Last Rate: 75 mL/hr (02/09/24 0935)      Current Outpatient Medications   Medication Instructions    albuterol 90 mcg/actuation inhaler 2 puffs, inhalation, Every 4 hours    aspirin 81 mg chewable tablet 1 tablet, oral, Daily    atorvastatin (LIPITOR) 80 mg, oral, Nightly    cefdinir (OMNICEF) 300 mg, oral, 2 times daily    ergocalciferol (VITAMIN D-2) 50,000 Units, oral, Weekly    gabapentin (NEURONTIN) 400 mg, oral, 3 times daily    levothyroxine (SYNTHROID, LEVOXYL) 137 mcg, oral, Daily    losartan (COZAAR) 50 mg, oral, Daily    melatonin 10 mg tablet 1 tablet, oral, Nightly    metFORMIN (GLUCOPHAGE) 1,000 mg, oral, 2 times daily with meals    metoprolol succinate XL (TOPROL-XL) 25 mg, oral, Daily    naloxone (NARCAN) 4 mg, nasal, As needed    nitroglycerin (NITROSTAT) 0.4 mg, sublingual, Every 5 min PRN    OneTouch Delica Plus Lancet 33 gauge misc Daily, AS DIRECTED    OneTouch Verio test strips strip Daily    oxyCODONE (ROXICODONE) 15 mg, oral, Every 6 hours PRN    potassium chloride CR 20 mEq ER tablet TAKE 1 TABLET(20 MEQ) BY MOUTH EVERY DAY. DO NOT CRUSH OR CHEW    rivaroxaban (XARELTO) 2.5 mg, oral, 2 times daily    Spiriva Respimat 2.5 mcg/actuation inhaler 2 puffs, inhalation, Daily    topiramate (Topamax) 25 mg tablet     torsemide (DEMADEX) 40 mg, oral, Daily        Review of Systems:      12 point review of systems was obtained in detail and is negative other than that detailed above.    Vital Signs:     Vitals:    02/08/24 2340 02/09/24 0141 02/09/24 0649 02/09/24 0725   BP:    104/62   BP Location:       Patient Position:       Pulse:    88   Resp:    16   Temp: 36.3 °C (97.3 °F)   36.1 °C (97 °F)   TempSrc:    Temporal   SpO2:  93% 95% 91%   Weight:       Height:            Intake/Output Summary (Last 24 hours) at 2/9/2024 0957  Last data filed at 2/9/2024 0916  Gross per 24 hour   Intake 900 ml   Output --   Net 900 ml       Wt Readings from Last 4 Encounters:   02/07/24 106 kg (233 lb 0.4 oz)   01/31/24 105 kg (232 lb)   01/30/24 105 kg (232 lb)   12/05/23 106 kg (234 lb)       Physical Examination:     GENERAL APPEARANCE: Well developed, well nourished, in no acute distress.  CHEST: Symmetric and non-tender.  INTEGUMENT: Skin warm and dry, without gross excoriationis or lesions.  HEENT: No gross abnormalities of conjunctiva, teeth, gums, oral mucosa  NECK: Supple, no JVD, no bruit. Thyroid not palpable.   NEURO/PSHCY: Alert and oriented x3; appropriate behavior and responses and responses, grossly normal cerebellar function with normal balance and coordination  LUNGS: Diminished, expiratory wheeze  HEART: Rate and rhythm regular with no evident murmur; no gallop appreciated.  ABDOMEN: Soft, nontender, no palpable hepatosplenomegaly, no mases, no bruits. Abdominal aorta not noted to be enlarged.  MUSCULOSKELETAL: Ambulatory with normal tandem gait.  EXTREMITIES: Warm with good color, no clubbing or cyanois.  1+ pitting edema bilateral lower extremities  PERIPHERAL VASCULAR: Pulses present and equally palpable; 2+ throughout.       Lab:     CBC:   Results from last 7 days   Lab Units 02/08/24  0544 02/07/24  1549   WBC AUTO x10*3/uL 4.7 6.0   RBC AUTO x10*6/uL 4.40 4.34   HEMOGLOBIN g/dL 12.4 12.3   HEMATOCRIT % 40.3 39.1   MCV fL 92 90   MCH pg 28.2 28.3   MCHC g/dL 30.8* 31.5*   RDW % 15.4* 15.4*   PLATELETS AUTO x10*3/uL 211 220     CMP:    Results from last 7 days   Lab Units 02/08/24  0544 02/07/24  1549   SODIUM mmol/L 135* 133*   POTASSIUM mmol/L 4.5 3.7   CHLORIDE mmol/L 99 97*   CO2 mmol/L 25 27   BUN mg/dL 10 9   CREATININE mg/dL 0.94 0.94   GLUCOSE mg/dL 531* 485*   PROTEIN TOTAL g/dL 6.6 6.8   CALCIUM mg/dL 9.0 8.8   BILIRUBIN TOTAL mg/dL 0.6 0.9   ALK PHOS U/L 130  127   AST U/L 43* 46*   ALT U/L 38 35     BMP:    Results from last 7 days   Lab Units 02/08/24  0544 02/07/24  1549   SODIUM mmol/L 135* 133*   POTASSIUM mmol/L 4.5 3.7   CHLORIDE mmol/L 99 97*   CO2 mmol/L 25 27   BUN mg/dL 10 9   CREATININE mg/dL 0.94 0.94   CALCIUM mg/dL 9.0 8.8   GLUCOSE mg/dL 531* 485*     Magnesium:  Results from last 7 days   Lab Units 02/07/24  1549   MAGNESIUM mg/dL 1.67     Troponin:    Results from last 7 days   Lab Units 02/07/24  1641 02/07/24  1549   TROPHS ng/L 17* 17*     BNP:   Results from last 7 days   Lab Units 02/07/24  1549   BNP pg/mL 43       Diagnostic Studies:   [place] [id number]  Tel [phone] Fax [phone]     TRANSTHORACIC ECHOCARDIOGRAM REPORT        [facility]  [id number] Saw Hernandez MD, [facility]  Study Date:       6/26/2023          Referring           CHIKA DOTSON  Physician:  MRN/PID:          [id number]           PCP:  Accession/Order#: [id number]          Department          [facility]  Location:  YOB: 1960          Fellow:  Gender:           F                  Nurse:  Admit Date:       6/24/2023          Sonographer:        Alina CRUZ  Admission Status: Inpatient -        Additional Staff:  Routine  Height:           172.00 cm          CC Report to:       SICU EMCLocation  Weight:           113.00 kg          Study Type:         Echocardiogram  BSA:              2.24 m2  Blood Pressure: 82 /47 mmHg     Diagnosis/ICD: I50.40-Unspecified combined systolic (congestive) and diastolic  (congestive) heart failure (CHF)  Indication:    Congestive Heart Failure  Procedure/CPT: Echo Complete w Full Doppler-[id number]     Patient History:  Diabetes:          Yes  Pertinent History: HTN, Hyperlipidemia, CAD and COPD.     Study Detail: The following Echo studies were performed: 2D, M-Mode, Doppler and  color flow. Technically challenging study due to prominent  lung  artifact, poor acoustic windows and body habitus. Definity used as  a contrast agent for endocardial border definition. Total contrast  used for this procedure was 2 mL via IV push. The patient was  awake.        PHYSICIAN INTERPRETATION:  Left Ventricle: Left ventricular systolic function is normal, with an estimated ejection fraction of 60-65%. Wall motion is abnormal. The left ventricular cavity size is normal. The left ventricular septal wall thickness is mildly increased. There is mildly increased left ventricular posterior wall thickness. Spectral Doppler shows a normal pattern of left ventricular diastolic filling. The basal inferior and basal inferoseptal segments are akinetic.  Left Atrium: The left atrium is normal in size. Left atrial volume index 23.7 mL/m2.  Right Ventricle: The right ventricle is normal in size. There is normal right ventricular global systolic function. Normal right ventricular chamber size and function.  Right Atrium: The right atrium is normal in size.  Aortic Valve: The aortic valve is trileaflet. There is no evidence of aortic valve regurgitation. The peak instantaneous gradient of the aortic valve is 10.9 mmHg. The mean gradient of the aortic valve is 4.0 mmHg.  Mitral Valve: The mitral valve is normal in structure. There is no evidence of mitral valve regurgitation. Mild (1+) mitral regurgitation.  Tricuspid Valve: The tricuspid valve is structurally normal. There is trace tricuspid regurgitation. Trivial tricuspid regurgitation with estimated RVSP of 21 mmHg.  Pulmonic Valve: The pulmonic valve is structurally normal. There is no indication of pulmonic valve regurgitation.  Pericardium: There is a small pericardial effusion. The effusion is circumferential. There is no evidence of cardiac tamponade.  Aorta: The aortic root is normal.  Systemic Veins: The inferior vena cava appears to be of normal size. There is less than 50% IVC collapse with inspiration.         CONCLUSIONS:  1. Left ventricular systolic function is normal with a 60-65% estimated ejection fraction.  2. The basal inferior and basal inferoseptal segments are akinetic.  3. Normal right ventricular chamber size and function.  4. There is no evidence of cardiac tamponade.  5. Mild (1+) mitral regurgitation.  6. Trivial tricuspid regurgitation with estimated RVSP of 21 mmHg.  7. Comparison study dated February 2, 2023 showed estimated LV ejection fraction 60 to 65% with mild MR and trivial TR. Moderate LVH.    ECG 12 lead    Result Date: 2/7/2024  Normal sinus rhythm Left ventricular hypertrophy with repolarization abnormality Inferior infarct (cited on or before 07-FEB-2024) Abnormal ECG When compared with ECG of 07-FEB-2024 16:00, (unconfirmed) No significant change was found See ED provider note for full interpretation and clinical correlation Confirmed by Osiel Marshall ([id number]) on 2/7/2024 10:53:11 PM    CT angio chest for pulmonary embolism    Result Date: 2/7/2024  STUDY: CT Angiogram of the Chest; 02/07/2024 at 7:14 PM INDICATION: Shortness of breath, lightheadedness. Elevated D-dimer. Evaluate for pulmonary embolism. COMPARISON: XR chest of same date, 02/07/24. CTA chest 02/01/23. CTA CAP 07/27/23. ACCESSION NUMBER(S): [id number] ORDERING CLINICIAN: RODRIGO PUTNAM TECHNIQUE:  CTA of the chest was performed with intravenous contrast. Images are reviewed and processed at a workstation according to the CT angiogram protocol with 3-D and/or MIP post processing imaging generated.  Omnipaque-350 75 mL was administered intravenously. Automated mA/kV exposure control was utilized and patient examination was performed in strict accordance with principles of ALARA. FINDINGS: There are no detectable pulmonary emboli. The pulmonary arteries are normal caliber. The heart is normal overall size with minimal LV dilatation. There are minimal coronary artery calcified lesions. There is no thoracic aortic aneurysm or  dissection. There is minimal atherosclerotic thrombus in the mid descending thoracic aorta. Small lymph nodes are seen in the mediastinum. There is no bulky lymphadenopathy or focal mass. There is no pericardial effusion. The lungs demonstrate minimal to moderate upper lobe-predominant centrilobular emphysema, in addition to minimal bullous emphysema at the left lung base. There is minimal biapical scarring, with additional focal scarring or atelectasis in the lung bases. There are no pleural effusions. There is no pneumothorax. Previous extensive surgery is seen in the lower thoracic and lumbar spine, with spinal rods in place. Rib cage appears intact. Upper abdomen suggests subtle cirrhotic morphology in the liver.    1. No detectable pulmonary emboli. 2. COPD; no infiltrates or vascular congestion. 3. Remainder as above. Signed by Micheal Diop MD    XR chest 1 view    Result Date: 2/7/2024  STUDY: Chest Radiograph;  2/7/2024 at 4:59 PM. INDICATION: Shortness of breath. COMPARISON: CTA CAP 7/27/2023; XR chest 6/24/2023, 3/1/2023; CTA chest 2/1/2023. ACCESSION NUMBER(S): [id number] ORDERING CLINICIAN: RODRIGO PUTNAM TECHNIQUE:  Frontal chest was obtained at 16:59 hours (two images). FINDINGS: CARDIOMEDIASTINAL SILHOUETTE: Cardiomediastinal silhouette is normal in size and configuration.  LUNGS: Lungs are clear.  ABDOMEN: No remarkable upper abdominal findings.  BONES: No acute osseous changes.    No acute pulmonary pathology. Signed by Tra Garcia MD        Radiology:     CT angio chest for pulmonary embolism   Final Result   1. No detectable pulmonary emboli.   2. COPD; no infiltrates or vascular congestion.   3. Remainder as above.   Signed by Micheal Diop MD      XR chest 1 view   Final Result   No acute pulmonary pathology.   Signed by Tra Garcia MD          Problem List:     Patient Active Problem List   Diagnosis    Complex regional pain syndrome type 1 of left upper extremity    PVD (peripheral  vascular disease) (CMS/HCC)    Primary hypertension    Type 2 diabetes mellitus, with long-term current use of insulin (CMS/HCC)    Vaginal bleeding    Combined systolic and diastolic cardiac dysfunction    Impaired functional mobility, balance, gait, and endurance    Cystitis    Gastroesophageal reflux disease without esophagitis    Hypothyroidism    Major depressive disorder, single episode, moderate (CMS/HCC)    Degeneration of lumbar intervertebral disc    Disseminated intravascular coagulation (CMS/HCC)    Nicotine use disorder    Obesity, Class I, BMI 30-34.9    Osteoarthrosis    Other voice and resonance disorders    Postoperative pain after spinal surgery    Posttraumatic muscle contracture following injury (CMS/HCC)    Pseudarthrosis following spinal fusion    Contusion of lower back    Lumbar radiculopathy    Sprain of sacroiliac ligament    Spinal stenosis of lumbar region    Sprain, low back    Tobacco use disorder    Traumatic compartment syndrome of left upper extremity (CMS/HCC)    Chronic low back pain    Chronic pain of left upper extremity    Pain of left hand    Degeneration of cervical intervertebral disc    Abnormal computed tomography angiography (CTA)    Ascending aortic aneurysm (CMS/HCC)    Asymptomatic stenosis of left carotid artery    Bilateral leg edema    CAD (coronary artery disease)    Class 2 obesity with body mass index (BMI) of 36.0 to 36.9 in adult    Hypertension associated with type 2 diabetes mellitus (CMS/HCC)    Infrarenal abdominal aortic aneurysm (AAA) without rupture (CMS/HCC)    Leg pain    Mixed diabetic hyperlipidemia associated with type 2 diabetes mellitus (CMS/HCC)    Moderate generalized edema    PAD (peripheral artery disease) (CMS/HCC)    Subclavian artery stenosis, left (CMS/HCC)    Weight gain    Hypotension, unspecified hypotension type       Assessment:   CAD  Hypotension  PAD  Hyperglycemia with type 2 diabetes mellitus  Hyperlipidemia  UTI  COPD    Plan:    Admitted to medicine  Telemetry monitoring.  Monitor electrolytes.  Keep potassium greater than 4 and magnesium greater than 2  Reviewed telemetry, shows normal sinus rhythm with no ectopy.   Give 500 cc bolus.  Continue fluids for 8 to 12 hours.  Orthostatic vital signs  Antibiotics per primary service for UTI  Patient was previously on steroids, steroids were discontinued per primary team.  Hyperglycemia improving.  Supplemental O2, keep SpO2 greater than 92%.  Continue his current COPD management  Expect blood pressure to improve with fluid  Will gradually introduce diuretics as BP tolerates.  Continue holding Demadex.  No indication for repeat echocardiogram.  EF was noted to be 60% on last echo  Patient has follow-up with Dr. Wellington on Monday, will reschedule that appointment.  Message sent to schedulers.  Will follow and evaluate patient tomorrow.      Artur Grider [facility]  Adult Gerontology Acute Care Nurse Practitioner  [facility] [place]   [facility]  [phone]      Of note, this documentation is completed using the Dragon Dictation system (voice recognition software). There may be spelling and/or grammatical errors that were not corrected prior to final submission.      Electronically signed by DINO Osborn, on 2/9/2024 at 9:57 AM   Patient seen and examined in conjunction with TIMUR Whitlock/CNP and agree with the evaluation as noted above.  64-year-old lady with a history of PVD and bifemoral bypass, secondary to critical limb ischemia carotid arteries subclavian vein stenosis hypertension and other comorbidities as listed above including CAD s/p stenting to the RCA who presents with symptomatic hypotension with some increased dizziness and significant fatigue with her day-to-day activities.  She was at her primary care physician's office and her blood pressure was noted to be significantly low with systolics in the 70s  to 80s for which she was referred for hospital admission.  She has received IV saline boluses and increased hydration since admission and blood pressure significantly improved.  She denied any chest pain.  Cardiac enzymes were mildly elevated with no significant ischemic EKG changes and no chest pain.  Agree with exam as noted above  ASSESSMENT AND PLAN:  1.  Symptomatic hypotension: This appears to be probably secondary to significant intravascular dehydration with improved blood pressure with fluid hydration.  Agree with discontinuing diuretics which may be slowly resumed if blood pressure continues to improve.  2.  CAD status post multivessel PCI as noted above with no recurrent chest pain, continue with current cardiac medications.  3.  Peripheral vascular disease: S/p femoropopliteal bypass, carotid stenting as noted above.  Recommend routine follow-up with vascular surgery postdischarge.  AKA

## 2024-02-09 NOTE — PROGRESS NOTES
"Daphney Chen is a 64 y.o. female on day 2 of admission presenting with Hypotension, unspecified hypotension type.    Subjective   Patient's discharge was held yesterday.  Remained hypoxic.  4 L nasal cannula.  Does not wear oxygen at home.  Also remained uncontrolled diabetes elevated hyperglycemia.  I had stopped the steroids.  Continue has difficulty with shortness of breath.  Also was having hypotension.  Was 90/50 at rest.  With ambulation was 70.  Symptomatic.  Lightheaded.  She does not currently see a pulmonologist.  Medications reviewed.  For now we will hold her antihypertensive medical therapy.  Obtain orthostatic vital signs.  Endocrinology consultation.  IV fluid bolus. Cardiology consult        Objective     Physical Exam  She is awake and alert.  Nasal cannula oxygen in place diminished breath sounds prolonged expiratory phase distant heart sounds hyperactive bowel sounds 2+ edema  Last Recorded Vitals  Blood pressure 104/62, pulse 88, temperature 36.1 °C (97 °F), temperature source Temporal, resp. rate 16, height 1.727 m (5' 8\"), weight 106 kg (233 lb 0.4 oz), SpO2 91 %.  Intake/Output last 3 Shifts:  I/O last 3 completed shifts:  In: 1300 (12.3 mL/kg) [P.O.:800; IV Piggyback:500]  Out: - (0 mL/kg)   Weight: 105.7 kg     Relevant Results  Recent Results (from the past 72 hour(s))   CBC and Auto Differential    Collection Time: 02/07/24  3:49 PM   Result Value Ref Range    WBC 6.0 4.4 - 11.3 x10*3/uL    nRBC 0.0 0.0 - 0.0 /100 WBCs    RBC 4.34 4.00 - 5.20 x10*6/uL    Hemoglobin 12.3 12.0 - 16.0 g/dL    Hematocrit 39.1 36.0 - 46.0 %    MCV 90 80 - 100 fL    MCH 28.3 26.0 - 34.0 pg    MCHC 31.5 (L) 32.0 - 36.0 g/dL    RDW 15.4 (H) 11.5 - 14.5 %    Platelets 220 150 - 450 x10*3/uL    Neutrophils % 68.2 40.0 - 80.0 %    Immature Granulocytes %, Automated 0.3 0.0 - 0.9 %    Lymphocytes % 18.3 13.0 - 44.0 %    Monocytes % 10.7 2.0 - 10.0 %    Eosinophils % 1.3 0.0 - 6.0 %    Basophils % 1.2 0.0 - 2.0 % "    Neutrophils Absolute 4.06 1.20 - 7.70 x10*3/uL    Immature Granulocytes Absolute, Automated 0.02 0.00 - 0.70 x10*3/uL    Lymphocytes Absolute 1.09 (L) 1.20 - 4.80 x10*3/uL    Monocytes Absolute 0.64 0.10 - 1.00 x10*3/uL    Eosinophils Absolute 0.08 0.00 - 0.70 x10*3/uL    Basophils Absolute 0.07 0.00 - 0.10 x10*3/uL   Comprehensive Metabolic Panel    Collection Time: 02/07/24  3:49 PM   Result Value Ref Range    Glucose 485 (HH) 74 - 99 mg/dL    Sodium 133 (L) 136 - 145 mmol/L    Potassium 3.7 3.5 - 5.3 mmol/L    Chloride 97 (L) 98 - 107 mmol/L    Bicarbonate 27 21 - 32 mmol/L    Anion Gap 13 10 - 20 mmol/L    Urea Nitrogen 9 6 - 23 mg/dL    Creatinine 0.94 0.50 - 1.05 mg/dL    eGFR 67 >60 mL/min/1.73m*2    Calcium 8.8 8.6 - 10.3 mg/dL    Albumin 3.3 (L) 3.4 - 5.0 g/dL    Alkaline Phosphatase 127 33 - 136 U/L    Total Protein 6.8 6.4 - 8.2 g/dL    AST 46 (H) 9 - 39 U/L    Bilirubin, Total 0.9 0.0 - 1.2 mg/dL    ALT 35 7 - 45 U/L   Magnesium    Collection Time: 02/07/24  3:49 PM   Result Value Ref Range    Magnesium 1.67 1.60 - 2.40 mg/dL   Lactate    Collection Time: 02/07/24  3:49 PM   Result Value Ref Range    Lactate 3.6 (H) 0.4 - 2.0 mmol/L   Protime-INR    Collection Time: 02/07/24  3:49 PM   Result Value Ref Range    Protime 11.9 9.8 - 12.8 seconds    INR 1.1 0.9 - 1.1   aPTT    Collection Time: 02/07/24  3:49 PM   Result Value Ref Range    aPTT 31 27 - 38 seconds   B-Type Natriuretic Peptide    Collection Time: 02/07/24  3:49 PM   Result Value Ref Range    BNP 43 0 - 99 pg/mL   D-Dimer, Quantitative VTE Exclusion    Collection Time: 02/07/24  3:49 PM   Result Value Ref Range    D-Dimer, Quantitative VTE Exclusion 1,578 (H) <=500 ng/mL FEU   Troponin I, High Sensitivity, Initial    Collection Time: 02/07/24  3:49 PM   Result Value Ref Range    Troponin I, High Sensitivity 17 (H) 0 - 13 ng/L   Blood Culture    Collection Time: 02/07/24  3:49 PM    Specimen: Peripheral Venipuncture; Blood culture   Result  Value Ref Range    Blood Culture No growth at 1 day    Sars-CoV-2 and Influenza A/B PCR    Collection Time: 02/07/24  3:52 PM   Result Value Ref Range    Flu A Result Not Detected Not Detected    Flu B Result Not Detected Not Detected    Coronavirus 2019, PCR Not Detected Not Detected   ECG 12 lead    Collection Time: 02/07/24  4:03 PM   Result Value Ref Range    Ventricular Rate 86 BPM    Atrial Rate 86 BPM    RI Interval 144 ms    QRS Duration 82 ms    QT Interval 370 ms    QTC Calculation(Bazett) 442 ms    P Axis 62 degrees    R Axis -19 degrees    T Axis 95 degrees    QRS Count 14 beats    Q Onset 213 ms    P Onset 141 ms    P Offset 204 ms    T Offset 398 ms    QTC Fredericia 417 ms   SST TOP    Collection Time: 02/07/24  4:20 PM   Result Value Ref Range    Extra Tube Hold for add-ons.    Blood Culture    Collection Time: 02/07/24  4:21 PM    Specimen: Peripheral Venipuncture; Blood culture   Result Value Ref Range    Blood Culture No growth at 1 day    Urinalysis with Reflex Culture and Microscopic    Collection Time: 02/07/24  4:40 PM   Result Value Ref Range    Color, Urine Yellow Straw, Yellow    Appearance, Urine Hazy (N) Clear    Specific Gravity, Urine 1.030 1.005 - 1.035    pH, Urine 5.0 5.0, 5.5, 6.0, 6.5, 7.0, 7.5, 8.0    Protein, Urine NEGATIVE NEGATIVE mg/dL    Glucose, Urine >=500 (3+) (A) NEGATIVE mg/dL    Blood, Urine NEGATIVE NEGATIVE    Ketones, Urine NEGATIVE NEGATIVE mg/dL    Bilirubin, Urine NEGATIVE NEGATIVE    Urobilinogen, Urine 2.0 (N) <2.0 mg/dL    Nitrite, Urine POSITIVE (A) NEGATIVE    Leukocyte Esterase, Urine SMALL (1+) (A) NEGATIVE   Extra Urine Gray Tube    Collection Time: 02/07/24  4:40 PM   Result Value Ref Range    Extra Tube Hold for add-ons.    Microscopic Only, Urine    Collection Time: 02/07/24  4:40 PM   Result Value Ref Range    WBC, Urine 21-50 (A) 1-5, NONE /HPF    RBC, Urine 1-2 NONE, 1-2, 3-5 /HPF    Squamous Epithelial Cells, Urine 1-9 (SPARSE) Reference range not  established. /HPF    Bacteria, Urine 1+ (A) NONE SEEN /HPF   Urine Culture    Collection Time: 02/07/24  4:40 PM    Specimen: Clean Catch/Voided; Urine   Result Value Ref Range    Urine Culture 20,000 - 80,000 Enteric bacilli (A)    Troponin, High Sensitivity, 1 Hour    Collection Time: 02/07/24  4:41 PM   Result Value Ref Range    Troponin I, High Sensitivity 17 (H) 0 - 13 ng/L   Lactate    Collection Time: 02/07/24  4:41 PM   Result Value Ref Range    Lactate 2.3 (H) 0.4 - 2.0 mmol/L   POCT GLUCOSE    Collection Time: 02/07/24  6:17 PM   Result Value Ref Range    POCT Glucose 310 (H) 74 - 99 mg/dL   POCT GLUCOSE    Collection Time: 02/07/24 11:03 PM   Result Value Ref Range    POCT Glucose 309 (H) 74 - 99 mg/dL   CBC    Collection Time: 02/08/24  5:44 AM   Result Value Ref Range    WBC 4.7 4.4 - 11.3 x10*3/uL    nRBC 0.0 0.0 - 0.0 /100 WBCs    RBC 4.40 4.00 - 5.20 x10*6/uL    Hemoglobin 12.4 12.0 - 16.0 g/dL    Hematocrit 40.3 36.0 - 46.0 %    MCV 92 80 - 100 fL    MCH 28.2 26.0 - 34.0 pg    MCHC 30.8 (L) 32.0 - 36.0 g/dL    RDW 15.4 (H) 11.5 - 14.5 %    Platelets 211 150 - 450 x10*3/uL   Comprehensive metabolic panel    Collection Time: 02/08/24  5:44 AM   Result Value Ref Range    Glucose 531 (HH) 74 - 99 mg/dL    Sodium 135 (L) 136 - 145 mmol/L    Potassium 4.5 3.5 - 5.3 mmol/L    Chloride 99 98 - 107 mmol/L    Bicarbonate 25 21 - 32 mmol/L    Anion Gap 16 10 - 20 mmol/L    Urea Nitrogen 10 6 - 23 mg/dL    Creatinine 0.94 0.50 - 1.05 mg/dL    eGFR 67 >60 mL/min/1.73m*2    Calcium 9.0 8.6 - 10.3 mg/dL    Albumin 3.3 (L) 3.4 - 5.0 g/dL    Alkaline Phosphatase 130 33 - 136 U/L    Total Protein 6.6 6.4 - 8.2 g/dL    AST 43 (H) 9 - 39 U/L    Bilirubin, Total 0.6 0.0 - 1.2 mg/dL    ALT 38 7 - 45 U/L   SST TOP    Collection Time: 02/08/24  5:44 AM   Result Value Ref Range    Extra Tube Hold for add-ons.    POCT GLUCOSE    Collection Time: 02/08/24  7:02 AM   Result Value Ref Range    POCT Glucose 465 (H) 74 - 99 mg/dL    POCT GLUCOSE    Collection Time: 02/08/24 11:15 AM   Result Value Ref Range    POCT Glucose 474 (H) 74 - 99 mg/dL   POCT GLUCOSE    Collection Time: 02/08/24  4:07 PM   Result Value Ref Range    POCT Glucose 386 (H) 74 - 99 mg/dL   POCT GLUCOSE    Collection Time: 02/08/24  7:32 PM   Result Value Ref Range    POCT Glucose 348 (H) 74 - 99 mg/dL   POCT GLUCOSE    Collection Time: 02/09/24  7:19 AM   Result Value Ref Range    POCT Glucose 333 (H) 74 - 99 mg/dL                          albuterol, 2 puff, inhalation, q4h  aspirin, 81 mg, oral, Daily  atorvastatin, 80 mg, oral, Nightly  cefTRIAXone, 1 g, intravenous, q24h  ergocalciferol, 50,000 Units, oral, Weekly  gabapentin, 400 mg, oral, TID  insulin regular, 0-10 Units, subcutaneous, TID with meals  ipratropium-albuteroL, 3 mL, nebulization, q6h  levothyroxine, 137 mcg, oral, Daily  [Held by provider] losartan, 50 mg, oral, Daily  melatonin, 10 mg, oral, Nightly  metFORMIN, 1,000 mg, oral, BID with meals  [Held by provider] metoprolol succinate XL, 25 mg, oral, Daily  polyethylene glycol, 17 g, oral, Daily  potassium chloride CR, 20 mEq, oral, Daily  rivaroxaban, 2.5 mg, oral, BID  sodium chloride, 500 mL, intravenous, Once  tiotropium, 2 Inhalation, inhalation, Daily  topiramate, 25 mg, oral, Nightly  [Held by provider] torsemide, 40 mg, oral, Daily        ECG 12 lead    Result Date: 2/7/2024  Normal sinus rhythm Left ventricular hypertrophy with repolarization abnormality Inferior infarct (cited on or before 07-FEB-2024) Abnormal ECG When compared with ECG of 07-FEB-2024 16:00, (unconfirmed) No significant change was found See ED provider note for full interpretation and clinical correlation Confirmed by Oseil Marshall ([id number]) on 2/7/2024 10:53:11 PM    CT angio chest for pulmonary embolism    Result Date: 2/7/2024  STUDY: CT Angiogram of the Chest; 02/07/2024 at 7:14 PM INDICATION: Shortness of breath, lightheadedness. Elevated D-dimer. Evaluate for pulmonary  embolism. COMPARISON: XR chest of same date, 02/07/24. CTA chest 02/01/23. CTA CAP 07/27/23. ACCESSION NUMBER(S): [id number] ORDERING CLINICIAN: RODRIGO PUTNAM TECHNIQUE:  CTA of the chest was performed with intravenous contrast. Images are reviewed and processed at a workstation according to the CT angiogram protocol with 3-D and/or MIP post processing imaging generated.  Omnipaque-350 75 mL was administered intravenously. Automated mA/kV exposure control was utilized and patient examination was performed in strict accordance with principles of ALARA. FINDINGS: There are no detectable pulmonary emboli. The pulmonary arteries are normal caliber. The heart is normal overall size with minimal LV dilatation. There are minimal coronary artery calcified lesions. There is no thoracic aortic aneurysm or dissection. There is minimal atherosclerotic thrombus in the mid descending thoracic aorta. Small lymph nodes are seen in the mediastinum. There is no bulky lymphadenopathy or focal mass. There is no pericardial effusion. The lungs demonstrate minimal to moderate upper lobe-predominant centrilobular emphysema, in addition to minimal bullous emphysema at the left lung base. There is minimal biapical scarring, with additional focal scarring or atelectasis in the lung bases. There are no pleural effusions. There is no pneumothorax. Previous extensive surgery is seen in the lower thoracic and lumbar spine, with spinal rods in place. Rib cage appears intact. Upper abdomen suggests subtle cirrhotic morphology in the liver.    1. No detectable pulmonary emboli. 2. COPD; no infiltrates or vascular congestion. 3. Remainder as above. Signed by Micheal Diop MD    XR chest 1 view    Result Date: 2/7/2024  STUDY: Chest Radiograph;  2/7/2024 at 4:59 PM. INDICATION: Shortness of breath. COMPARISON: CTA CAP 7/27/2023; XR chest 6/24/2023, 3/1/2023; CTA chest 2/1/2023. ACCESSION NUMBER(S): [id number] ORDERING CLINICIAN: RODRIGO PUTNAM  TECHNIQUE:  Frontal chest was obtained at 16:59 hours (two images). FINDINGS: CARDIOMEDIASTINAL SILHOUETTE: Cardiomediastinal silhouette is normal in size and configuration.  LUNGS: Lungs are clear.  ABDOMEN: No remarkable upper abdominal findings.  BONES: No acute osseous changes.    No acute pulmonary pathology. Signed by Tra Garcia MD       Assessment/Plan   Principal Problem:    Hypotension, unspecified hypotension type  Active Problems:    PVD (peripheral vascular disease) (CMS/HCC)    Primary hypertension    Combined systolic and diastolic cardiac dysfunction    Gastroesophageal reflux disease without esophagitis    Hypothyroidism    Major depressive disorder, single episode, moderate (CMS/HCC)    Osteoarthrosis    CAD (coronary artery disease)    Hypertension associated with type 2 diabetes mellitus (CMS/HCC)    PAD (peripheral artery disease) (CMS/HCC)    Subclavian artery stenosis, left (CMS/HCC)            I spent   minutes in the professional and overall care of this patient.      Tommy Dietz MD

## 2024-02-09 NOTE — PROGRESS NOTES
Patient has critical high blood glucose levels today and still remains on 4 liters oxygen, RN trying to reach out to PCP, patient unable to discharge.

## 2024-02-09 NOTE — CONSULTS
[facility] SIGNIFICANT EVENT NOTE:    Date:  2/9/2024  Patient:  Daphney Chen  YOB: 1960  MRN:  [id number]   Admit Date:  2/7/2024  =============================================================================================    I received a telephone consult by Dr. Gregg Dietz for evaluation of hypotension.     Concerns: Hypotension, +Orthostatics, O2 supplementation, Lactic acidosis    Exam:  A/Ox4, conversant no dyspnea noted during conversation and bed mobility.  Lung sounds diminished, 4L NC, per patient she has been on O2 previously in Jan 2023 post-PCI and was then told she had COPD. She does not wear O2 at baseline. She denies increased SOB, cough, or mucus production at this time.  She states she has been increasingly thirsty for the past several weeks, takes torsemide PTA  Antihypertensive medications have been on hold since admission  Ucx +UTI, awaiting susceptibility report, currently prescribed Ceftriaxone 1g  She has been IVF resuscitated yesterday and today a total of 1L bolus and IVF @75mL/hr with improvement in SBP post-IVF    Action Plan:  -LR bolus 1.5L to equate 30 mL/kg  -Recheck lactic acid  -Monitor BP  -Measure I/O    Attending Physician Notified: Communicated this plan with Dr. Dietz, will continue to follow work up and initiate ICU transfer if indicated.

## 2024-02-10 LAB
APPEARANCE UR: CLEAR
BACTERIA UR CULT: ABNORMAL
BILIRUB UR STRIP.AUTO-MCNC: NEGATIVE MG/DL
COLOR UR: ABNORMAL
GLUCOSE BLD MANUAL STRIP-MCNC: 221 MG/DL (ref 74–99)
GLUCOSE BLD MANUAL STRIP-MCNC: 260 MG/DL (ref 74–99)
GLUCOSE BLD MANUAL STRIP-MCNC: 273 MG/DL (ref 74–99)
GLUCOSE BLD MANUAL STRIP-MCNC: 291 MG/DL (ref 74–99)
GLUCOSE UR STRIP.AUTO-MCNC: ABNORMAL MG/DL
KETONES UR STRIP.AUTO-MCNC: NEGATIVE MG/DL
LEUKOCYTE ESTERASE UR QL STRIP.AUTO: NEGATIVE
NITRITE UR QL STRIP.AUTO: NEGATIVE
PH UR STRIP.AUTO: 7 [PH]
PROT UR STRIP.AUTO-MCNC: NEGATIVE MG/DL
RBC # UR STRIP.AUTO: NEGATIVE /UL
SP GR UR STRIP.AUTO: 1.01
UROBILINOGEN UR STRIP.AUTO-MCNC: 2 MG/DL

## 2024-02-10 PROCEDURE — 82947 ASSAY GLUCOSE BLOOD QUANT: CPT

## 2024-02-10 PROCEDURE — 94640 AIRWAY INHALATION TREATMENT: CPT

## 2024-02-10 PROCEDURE — 2500000004 HC RX 250 GENERAL PHARMACY W/ HCPCS (ALT 636 FOR OP/ED): Performed by: INTERNAL MEDICINE

## 2024-02-10 PROCEDURE — 99233 SBSQ HOSP IP/OBS HIGH 50: CPT | Performed by: INTERNAL MEDICINE

## 2024-02-10 PROCEDURE — 2500000005 HC RX 250 GENERAL PHARMACY W/O HCPCS: Performed by: FAMILY MEDICINE

## 2024-02-10 PROCEDURE — 1210000001 HC SEMI-PRIVATE ROOM DAILY

## 2024-02-10 PROCEDURE — 81003 URINALYSIS AUTO W/O SCOPE: CPT | Performed by: INTERNAL MEDICINE

## 2024-02-10 PROCEDURE — 2500000001 HC RX 250 WO HCPCS SELF ADMINISTERED DRUGS (ALT 637 FOR MEDICARE OP): Performed by: FAMILY MEDICINE

## 2024-02-10 PROCEDURE — 2500000002 HC RX 250 W HCPCS SELF ADMINISTERED DRUGS (ALT 637 FOR MEDICARE OP, ALT 636 FOR OP/ED): Performed by: FAMILY MEDICINE

## 2024-02-10 PROCEDURE — 99233 SBSQ HOSP IP/OBS HIGH 50: CPT | Performed by: FAMILY MEDICINE

## 2024-02-10 RX ORDER — SODIUM CHLORIDE 9 MG/ML
75 INJECTION, SOLUTION INTRAVENOUS CONTINUOUS
Status: DISCONTINUED | OUTPATIENT
Start: 2024-02-10 | End: 2024-02-13

## 2024-02-10 RX ADMIN — SODIUM CHLORIDE 75 ML/HR: 9 INJECTION, SOLUTION INTRAVENOUS at 16:43

## 2024-02-10 RX ADMIN — OXYCODONE HYDROCHLORIDE 15 MG: 5 TABLET ORAL at 20:58

## 2024-02-10 RX ADMIN — ALBUTEROL SULFATE 2 PUFF: 90 AEROSOL, METERED RESPIRATORY (INHALATION) at 00:23

## 2024-02-10 RX ADMIN — ALBUTEROL SULFATE 2 PUFF: 90 AEROSOL, METERED RESPIRATORY (INHALATION) at 20:23

## 2024-02-10 RX ADMIN — TIOTROPIUM BROMIDE INHALATION SPRAY 2 PUFF: 3.12 SPRAY, METERED RESPIRATORY (INHALATION) at 09:12

## 2024-02-10 RX ADMIN — RIVAROXABAN 2.5 MG: 2.5 TABLET, FILM COATED ORAL at 21:45

## 2024-02-10 RX ADMIN — METFORMIN HYDROCHLORIDE 1000 MG: 500 TABLET, FILM COATED ORAL at 16:43

## 2024-02-10 RX ADMIN — INSULIN HUMAN 6 UNITS: 100 INJECTION, SOLUTION PARENTERAL at 11:34

## 2024-02-10 RX ADMIN — ALBUTEROL SULFATE 2 PUFF: 90 AEROSOL, METERED RESPIRATORY (INHALATION) at 09:12

## 2024-02-10 RX ADMIN — GABAPENTIN 400 MG: 400 CAPSULE ORAL at 09:02

## 2024-02-10 RX ADMIN — IPRATROPIUM BROMIDE AND ALBUTEROL SULFATE 3 ML: 2.5; .5 SOLUTION RESPIRATORY (INHALATION) at 14:49

## 2024-02-10 RX ADMIN — OXYCODONE HYDROCHLORIDE 15 MG: 5 TABLET ORAL at 09:06

## 2024-02-10 RX ADMIN — IPRATROPIUM BROMIDE AND ALBUTEROL SULFATE 3 ML: 2.5; .5 SOLUTION RESPIRATORY (INHALATION) at 07:59

## 2024-02-10 RX ADMIN — GABAPENTIN 400 MG: 400 CAPSULE ORAL at 20:58

## 2024-02-10 RX ADMIN — RIVAROXABAN 2.5 MG: 2.5 TABLET, FILM COATED ORAL at 09:02

## 2024-02-10 RX ADMIN — ALBUTEROL SULFATE 2 PUFF: 90 AEROSOL, METERED RESPIRATORY (INHALATION) at 04:40

## 2024-02-10 RX ADMIN — OXYCODONE HYDROCHLORIDE 15 MG: 5 TABLET ORAL at 14:49

## 2024-02-10 RX ADMIN — POTASSIUM CHLORIDE 20 MEQ: 1500 TABLET, EXTENDED RELEASE ORAL at 09:02

## 2024-02-10 RX ADMIN — LEVOTHYROXINE SODIUM 137 MCG: 25 TABLET ORAL at 05:45

## 2024-02-10 RX ADMIN — INSULIN HUMAN 6 UNITS: 100 INJECTION, SOLUTION PARENTERAL at 09:08

## 2024-02-10 RX ADMIN — Medication 3 L/MIN: at 20:40

## 2024-02-10 RX ADMIN — TOPIRAMATE 25 MG: 25 TABLET ORAL at 20:58

## 2024-02-10 RX ADMIN — ATORVASTATIN CALCIUM 80 MG: 80 TABLET, FILM COATED ORAL at 20:58

## 2024-02-10 RX ADMIN — ASPIRIN 81 MG: 81 TABLET, CHEWABLE ORAL at 09:02

## 2024-02-10 RX ADMIN — GABAPENTIN 400 MG: 400 CAPSULE ORAL at 14:47

## 2024-02-10 RX ADMIN — OXYCODONE HYDROCHLORIDE 15 MG: 5 TABLET ORAL at 02:13

## 2024-02-10 RX ADMIN — ALBUTEROL SULFATE 2 PUFF: 90 AEROSOL, METERED RESPIRATORY (INHALATION) at 15:30

## 2024-02-10 RX ADMIN — IPRATROPIUM BROMIDE AND ALBUTEROL SULFATE 3 ML: 2.5; .5 SOLUTION RESPIRATORY (INHALATION) at 20:38

## 2024-02-10 RX ADMIN — METFORMIN HYDROCHLORIDE 1000 MG: 500 TABLET, FILM COATED ORAL at 09:02

## 2024-02-10 RX ADMIN — INSULIN HUMAN 6 UNITS: 100 INJECTION, SOLUTION PARENTERAL at 16:43

## 2024-02-10 RX ADMIN — Medication 10 MG: at 20:58

## 2024-02-10 RX ADMIN — SODIUM CHLORIDE 1000 ML: 9 INJECTION, SOLUTION INTRAVENOUS at 14:03

## 2024-02-10 RX ADMIN — CEFEPIME 1 G: 1 INJECTION, POWDER, FOR SOLUTION INTRAMUSCULAR; INTRAVENOUS at 17:58

## 2024-02-10 ASSESSMENT — COGNITIVE AND FUNCTIONAL STATUS - GENERAL
MOBILITY SCORE: 20
MOVING TO AND FROM BED TO CHAIR: A LITTLE
DRESSING REGULAR UPPER BODY CLOTHING: A LITTLE
PERSONAL GROOMING: A LITTLE
WALKING IN HOSPITAL ROOM: A LITTLE
WALKING IN HOSPITAL ROOM: A LITTLE
STANDING UP FROM CHAIR USING ARMS: A LITTLE
DAILY ACTIVITIY SCORE: 19
DRESSING REGULAR LOWER BODY CLOTHING: A LITTLE
TOILETING: A LITTLE
HELP NEEDED FOR BATHING: A LITTLE
HELP NEEDED FOR BATHING: A LITTLE
PERSONAL GROOMING: A LITTLE
DRESSING REGULAR LOWER BODY CLOTHING: A LITTLE
DAILY ACTIVITIY SCORE: 19
TOILETING: A LITTLE
MOVING TO AND FROM BED TO CHAIR: A LITTLE
PERSONAL GROOMING: A LITTLE
CLIMB 3 TO 5 STEPS WITH RAILING: A LITTLE
WALKING IN HOSPITAL ROOM: A LITTLE
MOVING TO AND FROM BED TO CHAIR: A LITTLE
STANDING UP FROM CHAIR USING ARMS: A LITTLE
DAILY ACTIVITIY SCORE: 19
DRESSING REGULAR LOWER BODY CLOTHING: A LITTLE
STANDING UP FROM CHAIR USING ARMS: A LITTLE
HELP NEEDED FOR BATHING: A LITTLE
DRESSING REGULAR UPPER BODY CLOTHING: A LITTLE
TOILETING: A LITTLE
MOBILITY SCORE: 20
DRESSING REGULAR UPPER BODY CLOTHING: A LITTLE
CLIMB 3 TO 5 STEPS WITH RAILING: A LITTLE
CLIMB 3 TO 5 STEPS WITH RAILING: A LITTLE
MOBILITY SCORE: 20

## 2024-02-10 ASSESSMENT — PAIN SCALES - GENERAL
PAINLEVEL_OUTOF10: 6
PAINLEVEL_OUTOF10: 1
PAINLEVEL_OUTOF10: 1
PAINLEVEL_OUTOF10: 6
PAINLEVEL_OUTOF10: 2

## 2024-02-10 ASSESSMENT — PAIN - FUNCTIONAL ASSESSMENT
PAIN_FUNCTIONAL_ASSESSMENT: 0-10

## 2024-02-10 ASSESSMENT — PAIN DESCRIPTION - ORIENTATION: ORIENTATION: RIGHT;LEFT;MID

## 2024-02-10 ASSESSMENT — PAIN DESCRIPTION - LOCATION
LOCATION: BACK
LOCATION: BACK

## 2024-02-10 NOTE — PROGRESS NOTES
[facility] Progress Note           Rounding Cardiologist:  Dr. Jose Murry  Primary Cardiologist: Dr. Ronald Wellington    Date:  2/10/2024  Patient:  Daphney Chen  YOB: 1960  MRN:  [id number]   Admit Date:  2/7/2024      SUBJECTIVE:    Daphney Chen was seen and examined today at bedside.   She denies any chest pain or shortness of breath.   Her overnight history is symptomatic with ongoing hypotension      VITALS:     Vitals:    02/10/24 0004 02/10/24 0409 02/10/24 0725 02/10/24 0800   BP: 93/56 93/57 80/63    BP Location: Right arm Right arm     Patient Position: Sitting Lying     Pulse: 86 69     Resp: 16 16     Temp: 36.4 °C (97.5 °F) 35.9 °C (96.6 °F) 36.1 °C (97 °F)    TempSrc: Temporal Temporal     SpO2: 95% 94% 96% 96%   Weight:       Height:           Intake/Output Summary (Last 24 hours) at 2/10/2024 1234  Last data filed at 2/9/2024 1915  Gross per 24 hour   Intake 1500 ml   Output --   Net 1500 ml       Wt Readings from Last 4 Encounters:   02/07/24 106 kg (233 lb 0.4 oz)   01/31/24 105 kg (232 lb)   01/30/24 105 kg (232 lb)   12/05/23 106 kg (234 lb)       CURRENT MEDICATIONS:   albuterol, 2 puff, inhalation, q4h  aspirin, 81 mg, oral, Daily  atorvastatin, 80 mg, oral, Nightly  cefTRIAXone, 1 g, intravenous, q24h  ergocalciferol, 50,000 Units, oral, Weekly  gabapentin, 400 mg, oral, TID  insulin regular, 0-10 Units, subcutaneous, TID with meals  ipratropium-albuteroL, 3 mL, nebulization, TID  levothyroxine, 137 mcg, oral, Daily  [Held by provider] losartan, 50 mg, oral, Daily  melatonin, 10 mg, oral, Nightly  metFORMIN, 1,000 mg, oral, BID with meals  [Held by provider] metoprolol succinate XL, 25 mg, oral, Daily  polyethylene glycol, 17 g, oral, Daily  potassium chloride CR, 20 mEq, oral, Daily  rivaroxaban, 2.5 mg, oral, BID  sodium chloride, 1,000 mL, intravenous, Once  tiotropium, 2 Inhalation, inhalation, Daily  topiramate, 25 mg, oral, Nightly  [Held by provider] torsemide, 40  mg, oral, Daily      oxygen, , Last Rate: 4 L/min (02/10/24 0800)  sodium chloride 0.9%, 75 mL/hr      Current Outpatient Medications   Medication Instructions    albuterol 90 mcg/actuation inhaler 2 puffs, inhalation, Every 4 hours    aspirin 81 mg chewable tablet 1 tablet, oral, Daily    atorvastatin (LIPITOR) 80 mg, oral, Nightly    cefdinir (OMNICEF) 300 mg, oral, 2 times daily    ergocalciferol (VITAMIN D-2) 50,000 Units, oral, Weekly    gabapentin (NEURONTIN) 400 mg, oral, 3 times daily    levothyroxine (SYNTHROID, LEVOXYL) 137 mcg, oral, Daily    losartan (COZAAR) 50 mg, oral, Daily    melatonin 10 mg tablet 1 tablet, oral, Nightly    metFORMIN (GLUCOPHAGE) 1,000 mg, oral, 2 times daily with meals    metoprolol succinate XL (TOPROL-XL) 25 mg, oral, Daily    naloxone (NARCAN) 4 mg, nasal, As needed    nitroglycerin (NITROSTAT) 0.4 mg, sublingual, Every 5 min PRN    OneTouch Delica Plus Lancet 33 gauge misc Daily, AS DIRECTED    OneTouch Verio test strips strip Daily    oxyCODONE (ROXICODONE) 15 mg, oral, Every 6 hours PRN    potassium chloride CR 20 mEq ER tablet TAKE 1 TABLET(20 MEQ) BY MOUTH EVERY DAY. DO NOT CRUSH OR CHEW    rivaroxaban (XARELTO) 2.5 mg, oral, 2 times daily    Spiriva Respimat 2.5 mcg/actuation inhaler 2 puffs, inhalation, Daily    topiramate (Topamax) 25 mg tablet     torsemide (DEMADEX) 40 mg, oral, Daily        PHYSICAL EXAMINATION:   GENERAL:  Well developed, well nourished, in no acute distress.  CHEST:  Symmetric and nontender.  NEURO/PSYCH:  Alert and oriented times three with approppriate behavior and responses.  NECK:  Supple, no JVD, no bruit.  LUNGS:  Clear to auscultation bilaterally, normal respiratory effort.  HEART:  Rate and rhythm regular with no evident murmur, no gallop appreciated.        There are no rubs, clicks or heaves.  EXTREMITIES:  Warm with good color, no clubbing or cyanosis.  There is trace edema noted.  PERIPHERAL VASCULAR:  Pulses present and equally  "palpable; 2+ throughout.      LAB DATA:     CBC:   Results from last 7 days   Lab Units 02/08/24  0544   WBC AUTO x10*3/uL 4.7   RBC AUTO x10*6/uL 4.40   HEMOGLOBIN g/dL 12.4   HEMATOCRIT % 40.3   PLATELETS AUTO x10*3/uL 211        CMP:    Results from last 7 days   Lab Units 02/09/24  1425   SODIUM mmol/L 137   POTASSIUM mmol/L 3.8   CHLORIDE mmol/L 102   CO2 mmol/L 29   BUN mg/dL 18   CREATININE mg/dL 0.86   GLUCOSE mg/dL 251*   CALCIUM mg/dL 8.4*       Cardiac Enzymes:    Lab Results   Component Value Date    TROPHS 17 (H) 02/07/2024    TROPHS 17 (H) 02/07/2024    TROPHS 245 (H) 06/25/2023       Magnesium:    Lab Results   Component Value Date    MG 1.67 02/07/2024       Lipid Profile:  No results found for: \"CHLPL\", \"TRIG\", \"HDL\", \"LDLCALC\", \"LDLDIRECT\"    TSH:  No results found for: \"TSH\"    BNP:   Lab Results   Component Value Date    BNP 43 02/07/2024        PT/INR:    Lab Results   Component Value Date    PROTIME 11.9 02/07/2024    INR 1.1 02/07/2024       HgBA1c:    Lab Results   Component Value Date    HGBA1C 7.8 (H) 11/30/2023       CBC:  Lab Results   Component Value Date    WBC 4.7 02/08/2024    WBC 6.0 02/07/2024    RBC 4.40 02/08/2024    RBC 4.34 02/07/2024    HGB 12.4 02/08/2024    HGB 12.3 02/07/2024    HCT 40.3 02/08/2024    HCT 39.1 02/07/2024    MCV 92 02/08/2024    MCV 90 02/07/2024    MCH 28.2 02/08/2024    MCH 28.3 02/07/2024    MCHC 30.8 (L) 02/08/2024    MCHC 31.5 (L) 02/07/2024    RDW 15.4 (H) 02/08/2024    RDW 15.4 (H) 02/07/2024     02/08/2024     02/07/2024       BMP:  Lab Results   Component Value Date     02/09/2024     (L) 02/08/2024     (L) 02/07/2024    K 3.8 02/09/2024    K 4.5 02/08/2024    K 3.7 02/07/2024     02/09/2024    CL 99 02/08/2024    CL 97 (L) 02/07/2024    CO2 29 02/09/2024    CO2 25 02/08/2024    CO2 27 02/07/2024    BUN 18 02/09/2024    BUN 10 02/08/2024    BUN 9 02/07/2024    CREATININE 0.86 02/09/2024    CREATININE 0.94 " 02/08/2024    CREATININE 0.94 02/07/2024       Hepatic Function Panel:    Lab Results   Component Value Date    ALKPHOS 130 02/08/2024    ALT 38 02/08/2024    AST 43 (H) 02/08/2024    PROT 6.6 02/08/2024    BILITOT 0.6 02/08/2024       DIAGNOSTIC TESTING:     ECG 12 lead    Result Date: 2/7/2024  Normal sinus rhythm Left ventricular hypertrophy with repolarization abnormality Inferior infarct (cited on or before 07-FEB-2024) Abnormal ECG When compared with ECG of 07-FEB-2024 16:00, (unconfirmed) No significant change was found See ED provider note for full interpretation and clinical correlation Confirmed by Osiel Marshall ([id number]) on 2/7/2024 10:53:11 PM    CT angio chest for pulmonary embolism    Result Date: 2/7/2024  STUDY: CT Angiogram of the Chest; 02/07/2024 at 7:14 PM INDICATION: Shortness of breath, lightheadedness. Elevated D-dimer. Evaluate for pulmonary embolism. COMPARISON: XR chest of same date, 02/07/24. CTA chest 02/01/23. CTA CAP 07/27/23. ACCESSION NUMBER(S): [id number] ORDERING CLINICIAN: RODRIGO PUTNAM TECHNIQUE:  CTA of the chest was performed with intravenous contrast. Images are reviewed and processed at a workstation according to the CT angiogram protocol with 3-D and/or MIP post processing imaging generated.  Omnipaque-350 75 mL was administered intravenously. Automated mA/kV exposure control was utilized and patient examination was performed in strict accordance with principles of ALARA. FINDINGS: There are no detectable pulmonary emboli. The pulmonary arteries are normal caliber. The heart is normal overall size with minimal LV dilatation. There are minimal coronary artery calcified lesions. There is no thoracic aortic aneurysm or dissection. There is minimal atherosclerotic thrombus in the mid descending thoracic aorta. Small lymph nodes are seen in the mediastinum. There is no bulky lymphadenopathy or focal mass. There is no pericardial effusion. The lungs demonstrate minimal to  moderate upper lobe-predominant centrilobular emphysema, in addition to minimal bullous emphysema at the left lung base. There is minimal biapical scarring, with additional focal scarring or atelectasis in the lung bases. There are no pleural effusions. There is no pneumothorax. Previous extensive surgery is seen in the lower thoracic and lumbar spine, with spinal rods in place. Rib cage appears intact. Upper abdomen suggests subtle cirrhotic morphology in the liver.    1. No detectable pulmonary emboli. 2. COPD; no infiltrates or vascular congestion. 3. Remainder as above. Signed by Micheal Diop MD    XR chest 1 view    Result Date: 2/7/2024  STUDY: Chest Radiograph;  2/7/2024 at 4:59 PM. INDICATION: Shortness of breath. COMPARISON: CTA CAP 7/27/2023; XR chest 6/24/2023, 3/1/2023; CTA chest 2/1/2023. ACCESSION NUMBER(S): [id number] ORDERING CLINICIAN: RODRIGO PUTNAM TECHNIQUE:  Frontal chest was obtained at 16:59 hours (two images). FINDINGS: CARDIOMEDIASTINAL SILHOUETTE: Cardiomediastinal silhouette is normal in size and configuration.  LUNGS: Lungs are clear.  ABDOMEN: No remarkable upper abdominal findings.  BONES: No acute osseous changes.    No acute pulmonary pathology. Signed by Tra Garcia MD       RADIOLOGY:     CT angio chest for pulmonary embolism   Final Result   1. No detectable pulmonary emboli.   2. COPD; no infiltrates or vascular congestion.   3. Remainder as above.   Signed by Micheal Diop MD      XR chest 1 view   Final Result   No acute pulmonary pathology.   Signed by Tra Garcia MD          PROBLEM LIST     Patient Active Problem List   Diagnosis    Complex regional pain syndrome type 1 of left upper extremity    PVD (peripheral vascular disease) (CMS/HCC)    Primary hypertension    Type 2 diabetes mellitus, with long-term current use of insulin (CMS/HCC)    Vaginal bleeding    Combined systolic and diastolic cardiac dysfunction    Impaired functional mobility, balance, gait, and  endurance    Cystitis    Gastroesophageal reflux disease without esophagitis    Hypothyroidism    Major depressive disorder, single episode, moderate (CMS/HCC)    Degeneration of lumbar intervertebral disc    Disseminated intravascular coagulation (CMS/HCC)    Nicotine use disorder    Obesity, Class I, BMI 30-34.9    Osteoarthrosis    Other voice and resonance disorders    Postoperative pain after spinal surgery    Posttraumatic muscle contracture following injury (CMS/HCC)    Pseudarthrosis following spinal fusion    Contusion of lower back    Lumbar radiculopathy    Sprain of sacroiliac ligament    Spinal stenosis of lumbar region    Sprain, low back    Tobacco use disorder    Traumatic compartment syndrome of left upper extremity (CMS/HCC)    Chronic low back pain    Chronic pain of left upper extremity    Pain of left hand    Degeneration of cervical intervertebral disc    Abnormal computed tomography angiography (CTA)    Ascending aortic aneurysm (CMS/HCC)    Asymptomatic stenosis of left carotid artery    Bilateral leg edema    CAD (coronary artery disease)    Class 2 obesity with body mass index (BMI) of 36.0 to 36.9 in adult    Hypertension associated with type 2 diabetes mellitus (CMS/HCC)    Infrarenal abdominal aortic aneurysm (AAA) without rupture (CMS/HCC)    Leg pain    Mixed diabetic hyperlipidemia associated with type 2 diabetes mellitus (CMS/HCC)    Moderate generalized edema    PAD (peripheral artery disease) (CMS/HCC)    Subclavian artery stenosis, left (CMS/HCC)    Weight gain    Hypotension, unspecified hypotension type       ASSESSMENT:         64-year-old female seen evaluate the bedside in telemetry unit.    Bedside examination evaluation were performed by me.    Chart was reviewed in detail discussed with the patient family and nursing staff.    Impression:  Hypotension persisted in the face of increased lactate and reported urinary tract infection  Normal, cardiac by most recent echo  No  active cardiovascular symptoms  Hyperlipidemia  Type 2 diabetes on insulin  COPD  Obesity  Peripheral vascular disease  History of abdominal aortic aneurysm  PLAN:   Recommendation:  Infectious diseases been consulted to rule out any more extensive infection that may be leading to hypotension and lactic acidosis  Will give adequate fluid bolus and ongoing saline solution (should be tolerable based on the patient's normal LV function)  Current to assess any potential of potential in regards to the recent discharge continuation of steroids which may have led to a adrenal type of issue  No additional cardiac measures at this time but we will continue to follow    Please do not hesitate to call with questions.  Electronically signed by Jose Murry DO, on 2/10/2024 at 12:34 PM

## 2024-02-10 NOTE — CARE PLAN
The patient's goals for the shift include to sleep    The clinical goals for the shift include to maintain BP and blood sugar wnl    Problem: Fall/Injury  Goal: Not fall by end of shift  Outcome: Progressing  Goal: Be free from injury by end of the shift  Outcome: Progressing  Goal: Verbalize understanding of personal risk factors for fall in the hospital  Outcome: Progressing  Goal: Verbalize understanding of risk factor reduction measures to prevent injury from fall in the home  Outcome: Progressing  Goal: Use assistive devices by end of the shift  Outcome: Progressing  Goal: Pace activities to prevent fatigue by end of the shift  Outcome: Progressing     Problem: Pain  Goal: My pain/discomfort is manageable  Outcome: Progressing     Problem: Safety  Goal: Patient will be injury free during hospitalization  Outcome: Progressing  Goal: I will remain free of falls  Outcome: Progressing     Problem: Daily Care  Goal: Daily care needs are met  Outcome: Progressing     Problem: Psychosocial Needs  Goal: Demonstrates ability to cope with hospitalization/illness  Outcome: Progressing  Goal: Collaborate with me, my family, and caregiver to identify my specific goals  Outcome: Progressing     Problem: Discharge Barriers  Goal: My discharge needs are met  Outcome: Progressing     Problem: Respiratory  Goal: Clear secretions with interventions this shift  Outcome: Progressing  Goal: Minimize anxiety/maximize coping throughout shift  Outcome: Progressing  Goal: Minimal/no exertional discomfort or dyspnea this shift  Outcome: Progressing  Goal: No signs of respiratory distress (eg. Use of accessory muscles. Peds grunting)  Outcome: Progressing  Goal: Patent airway maintained this shift  Outcome: Progressing  Goal: Tolerate mechanical ventilation evidenced by VS/agitation level this shift  Outcome: Progressing  Goal: Tolerate pulmonary toileting this shift  Outcome: Progressing  Goal: Verbalize decreased shortness of breath  this shift  Outcome: Progressing  Goal: Wean oxygen to maintain O2 saturation per order/standard this shift  Outcome: Progressing  Goal: Increase self care and/or family involvement in next 24 hours  Outcome: Progressing

## 2024-02-10 NOTE — NURSING NOTE
At 1548, informed Doctor J Luis of patient's Lactate level and need for infectious disease to be placed.

## 2024-02-10 NOTE — PROGRESS NOTES
"Daphney Chen is a 64 y.o. female on day 3 of admission presenting with Hypotension, unspecified hypotension type.    Subjective   Hospital follow-up.  Patient seen at the bedside.  Spoke with nursing at bedside.  Yesterday patient developed worsening hypotension.  Lightheaded.  Symptomatic.  Worse with standing.  Also developed lactic acidosis.  Had persistent hyperglycemia despite discontinuation of steroids.  I initiated cardiology consultation endocrinology consultation infectious disease consultation.  She continued on IV Rocephin.  Continue to have symptomatic hypotension.  I initiated consultation with an intensive care.  Was seen by intensive care team.  Given fluid bolus.  Blood pressure improved.  Remained on the floor.  Patient seen this morning.  States feels better.  Tolerated fluids.  Diuretics were held by cardiology.  Continues on IV Rocephin.  Pending recommendations from infectious disease consultation.  Sugars reviewed.  For now we will continue with IV fluid resuscitation.  Resume diuretics per cardiology when tolerates.  Continue IV Rocephin awaiting further recommendations from infectious disease.  Continue current management of her sugars.       Objective     Physical Exam  Alert she is breathing ability she is in no acute distress no JVD no bibasilar crackles soft normoactive bowel sounds trace edema distant heart sounds  Last Recorded Vitals  Blood pressure 80/63, pulse 69, temperature 36.1 °C (97 °F), resp. rate 16, height 1.727 m (5' 8\"), weight 106 kg (233 lb 0.4 oz), SpO2 96 %.  Intake/Output last 3 Shifts:  I/O last 3 completed shifts:  In: 3401.3 (32.2 mL/kg) [P.O.:400; I.V.:501.3 (4.7 mL/kg); IV Piggyback:2500]  Out: - (0 mL/kg)   Weight: 105.7 kg     Relevant Results  Recent Results (from the past 72 hour(s))   CBC and Auto Differential    Collection Time: 02/07/24  3:49 PM   Result Value Ref Range    WBC 6.0 4.4 - 11.3 x10*3/uL    nRBC 0.0 0.0 - 0.0 /100 WBCs    RBC 4.34 4.00 - " 5.20 x10*6/uL    Hemoglobin 12.3 12.0 - 16.0 g/dL    Hematocrit 39.1 36.0 - 46.0 %    MCV 90 80 - 100 fL    MCH 28.3 26.0 - 34.0 pg    MCHC 31.5 (L) 32.0 - 36.0 g/dL    RDW 15.4 (H) 11.5 - 14.5 %    Platelets 220 150 - 450 x10*3/uL    Neutrophils % 68.2 40.0 - 80.0 %    Immature Granulocytes %, Automated 0.3 0.0 - 0.9 %    Lymphocytes % 18.3 13.0 - 44.0 %    Monocytes % 10.7 2.0 - 10.0 %    Eosinophils % 1.3 0.0 - 6.0 %    Basophils % 1.2 0.0 - 2.0 %    Neutrophils Absolute 4.06 1.20 - 7.70 x10*3/uL    Immature Granulocytes Absolute, Automated 0.02 0.00 - 0.70 x10*3/uL    Lymphocytes Absolute 1.09 (L) 1.20 - 4.80 x10*3/uL    Monocytes Absolute 0.64 0.10 - 1.00 x10*3/uL    Eosinophils Absolute 0.08 0.00 - 0.70 x10*3/uL    Basophils Absolute 0.07 0.00 - 0.10 x10*3/uL   Comprehensive Metabolic Panel    Collection Time: 02/07/24  3:49 PM   Result Value Ref Range    Glucose 485 (HH) 74 - 99 mg/dL    Sodium 133 (L) 136 - 145 mmol/L    Potassium 3.7 3.5 - 5.3 mmol/L    Chloride 97 (L) 98 - 107 mmol/L    Bicarbonate 27 21 - 32 mmol/L    Anion Gap 13 10 - 20 mmol/L    Urea Nitrogen 9 6 - 23 mg/dL    Creatinine 0.94 0.50 - 1.05 mg/dL    eGFR 67 >60 mL/min/1.73m*2    Calcium 8.8 8.6 - 10.3 mg/dL    Albumin 3.3 (L) 3.4 - 5.0 g/dL    Alkaline Phosphatase 127 33 - 136 U/L    Total Protein 6.8 6.4 - 8.2 g/dL    AST 46 (H) 9 - 39 U/L    Bilirubin, Total 0.9 0.0 - 1.2 mg/dL    ALT 35 7 - 45 U/L   Magnesium    Collection Time: 02/07/24  3:49 PM   Result Value Ref Range    Magnesium 1.67 1.60 - 2.40 mg/dL   Lactate    Collection Time: 02/07/24  3:49 PM   Result Value Ref Range    Lactate 3.6 (H) 0.4 - 2.0 mmol/L   Protime-INR    Collection Time: 02/07/24  3:49 PM   Result Value Ref Range    Protime 11.9 9.8 - 12.8 seconds    INR 1.1 0.9 - 1.1   aPTT    Collection Time: 02/07/24  3:49 PM   Result Value Ref Range    aPTT 31 27 - 38 seconds   B-Type Natriuretic Peptide    Collection Time: 02/07/24  3:49 PM   Result Value Ref Range    BNP  43 0 - 99 pg/mL   D-Dimer, Quantitative VTE Exclusion    Collection Time: 02/07/24  3:49 PM   Result Value Ref Range    D-Dimer, Quantitative VTE Exclusion 1,578 (H) <=500 ng/mL FEU   Troponin I, High Sensitivity, Initial    Collection Time: 02/07/24  3:49 PM   Result Value Ref Range    Troponin I, High Sensitivity 17 (H) 0 - 13 ng/L   Blood Culture    Collection Time: 02/07/24  3:49 PM    Specimen: Peripheral Venipuncture; Blood culture   Result Value Ref Range    Blood Culture No growth at 2 days    Sars-CoV-2 and Influenza A/B PCR    Collection Time: 02/07/24  3:52 PM   Result Value Ref Range    Flu A Result Not Detected Not Detected    Flu B Result Not Detected Not Detected    Coronavirus 2019, PCR Not Detected Not Detected   ECG 12 lead    Collection Time: 02/07/24  4:03 PM   Result Value Ref Range    Ventricular Rate 86 BPM    Atrial Rate 86 BPM    IN Interval 144 ms    QRS Duration 82 ms    QT Interval 370 ms    QTC Calculation(Bazett) 442 ms    P Axis 62 degrees    R Axis -19 degrees    T Axis 95 degrees    QRS Count 14 beats    Q Onset 213 ms    P Onset 141 ms    P Offset 204 ms    T Offset 398 ms    QTC Fredericia 417 ms   SST TOP    Collection Time: 02/07/24  4:20 PM   Result Value Ref Range    Extra Tube Hold for add-ons.    Blood Culture    Collection Time: 02/07/24  4:21 PM    Specimen: Peripheral Venipuncture; Blood culture   Result Value Ref Range    Blood Culture No growth at 2 days    Urinalysis with Reflex Culture and Microscopic    Collection Time: 02/07/24  4:40 PM   Result Value Ref Range    Color, Urine Yellow Straw, Yellow    Appearance, Urine Hazy (N) Clear    Specific Gravity, Urine 1.030 1.005 - 1.035    pH, Urine 5.0 5.0, 5.5, 6.0, 6.5, 7.0, 7.5, 8.0    Protein, Urine NEGATIVE NEGATIVE mg/dL    Glucose, Urine >=500 (3+) (A) NEGATIVE mg/dL    Blood, Urine NEGATIVE NEGATIVE    Ketones, Urine NEGATIVE NEGATIVE mg/dL    Bilirubin, Urine NEGATIVE NEGATIVE    Urobilinogen, Urine 2.0 (N) <2.0  mg/dL    Nitrite, Urine POSITIVE (A) NEGATIVE    Leukocyte Esterase, Urine SMALL (1+) (A) NEGATIVE   Extra Urine Gray Tube    Collection Time: 02/07/24  4:40 PM   Result Value Ref Range    Extra Tube Hold for add-ons.    Microscopic Only, Urine    Collection Time: 02/07/24  4:40 PM   Result Value Ref Range    WBC, Urine 21-50 (A) 1-5, NONE /HPF    RBC, Urine 1-2 NONE, 1-2, 3-5 /HPF    Squamous Epithelial Cells, Urine 1-9 (SPARSE) Reference range not established. /HPF    Bacteria, Urine 1+ (A) NONE SEEN /HPF   Urine Culture    Collection Time: 02/07/24  4:40 PM    Specimen: Clean Catch/Voided; Urine   Result Value Ref Range    Urine Culture 20,000 - 80,000 Escherichia coli (A)        Susceptibility    Escherichia coli - MICROSCAN     Ampicillin  Susceptible      Cefazolin  Susceptible      Cefazolin (uncomplicated UTIs only)  Susceptible      Ciprofloxacin  Susceptible      Gentamicin  Susceptible      Nitrofurantoin  Susceptible      Piperacillin/Tazobactam  Susceptible      Trimethoprim/Sulfamethoxazole  Susceptible    Troponin, High Sensitivity, 1 Hour    Collection Time: 02/07/24  4:41 PM   Result Value Ref Range    Troponin I, High Sensitivity 17 (H) 0 - 13 ng/L   Lactate    Collection Time: 02/07/24  4:41 PM   Result Value Ref Range    Lactate 2.3 (H) 0.4 - 2.0 mmol/L   POCT GLUCOSE    Collection Time: 02/07/24  6:17 PM   Result Value Ref Range    POCT Glucose 310 (H) 74 - 99 mg/dL   POCT GLUCOSE    Collection Time: 02/07/24 11:03 PM   Result Value Ref Range    POCT Glucose 309 (H) 74 - 99 mg/dL   CBC    Collection Time: 02/08/24  5:44 AM   Result Value Ref Range    WBC 4.7 4.4 - 11.3 x10*3/uL    nRBC 0.0 0.0 - 0.0 /100 WBCs    RBC 4.40 4.00 - 5.20 x10*6/uL    Hemoglobin 12.4 12.0 - 16.0 g/dL    Hematocrit 40.3 36.0 - 46.0 %    MCV 92 80 - 100 fL    MCH 28.2 26.0 - 34.0 pg    MCHC 30.8 (L) 32.0 - 36.0 g/dL    RDW 15.4 (H) 11.5 - 14.5 %    Platelets 211 150 - 450 x10*3/uL   Comprehensive metabolic panel     Collection Time: 02/08/24  5:44 AM   Result Value Ref Range    Glucose 531 (HH) 74 - 99 mg/dL    Sodium 135 (L) 136 - 145 mmol/L    Potassium 4.5 3.5 - 5.3 mmol/L    Chloride 99 98 - 107 mmol/L    Bicarbonate 25 21 - 32 mmol/L    Anion Gap 16 10 - 20 mmol/L    Urea Nitrogen 10 6 - 23 mg/dL    Creatinine 0.94 0.50 - 1.05 mg/dL    eGFR 67 >60 mL/min/1.73m*2    Calcium 9.0 8.6 - 10.3 mg/dL    Albumin 3.3 (L) 3.4 - 5.0 g/dL    Alkaline Phosphatase 130 33 - 136 U/L    Total Protein 6.6 6.4 - 8.2 g/dL    AST 43 (H) 9 - 39 U/L    Bilirubin, Total 0.6 0.0 - 1.2 mg/dL    ALT 38 7 - 45 U/L   SST TOP    Collection Time: 02/08/24  5:44 AM   Result Value Ref Range    Extra Tube Hold for add-ons.    POCT GLUCOSE    Collection Time: 02/08/24  7:02 AM   Result Value Ref Range    POCT Glucose 465 (H) 74 - 99 mg/dL   POCT GLUCOSE    Collection Time: 02/08/24 11:15 AM   Result Value Ref Range    POCT Glucose 474 (H) 74 - 99 mg/dL   POCT GLUCOSE    Collection Time: 02/08/24  4:07 PM   Result Value Ref Range    POCT Glucose 386 (H) 74 - 99 mg/dL   POCT GLUCOSE    Collection Time: 02/08/24  7:32 PM   Result Value Ref Range    POCT Glucose 348 (H) 74 - 99 mg/dL   POCT GLUCOSE    Collection Time: 02/09/24  7:19 AM   Result Value Ref Range    POCT Glucose 333 (H) 74 - 99 mg/dL   POCT GLUCOSE    Collection Time: 02/09/24 10:59 AM   Result Value Ref Range    POCT Glucose 280 (H) 74 - 99 mg/dL   Basic Metabolic Panel    Collection Time: 02/09/24  2:25 PM   Result Value Ref Range    Glucose 251 (H) 74 - 99 mg/dL    Sodium 137 136 - 145 mmol/L    Potassium 3.8 3.5 - 5.3 mmol/L    Chloride 102 98 - 107 mmol/L    Bicarbonate 29 21 - 32 mmol/L    Anion Gap 10 10 - 20 mmol/L    Urea Nitrogen 18 6 - 23 mg/dL    Creatinine 0.86 0.50 - 1.05 mg/dL    eGFR 75 >60 mL/min/1.73m*2    Calcium 8.4 (L) 8.6 - 10.3 mg/dL   Lactate    Collection Time: 02/09/24  2:25 PM   Result Value Ref Range    Lactate 3.2 (H) 0.4 - 2.0 mmol/L   SST TOP    Collection Time:  02/09/24  2:25 PM   Result Value Ref Range    Extra Tube Hold for add-ons.    POCT GLUCOSE    Collection Time: 02/09/24  4:01 PM   Result Value Ref Range    POCT Glucose 244 (H) 74 - 99 mg/dL   Lactate    Collection Time: 02/09/24  8:01 PM   Result Value Ref Range    Lactate 2.2 (H) 0.4 - 2.0 mmol/L   POCT GLUCOSE    Collection Time: 02/09/24 10:26 PM   Result Value Ref Range    POCT Glucose 264 (H) 74 - 99 mg/dL   POCT GLUCOSE    Collection Time: 02/10/24  6:36 AM   Result Value Ref Range    POCT Glucose 273 (H) 74 - 99 mg/dL                          albuterol, 2 puff, inhalation, q4h  aspirin, 81 mg, oral, Daily  atorvastatin, 80 mg, oral, Nightly  cefTRIAXone, 1 g, intravenous, q24h  ergocalciferol, 50,000 Units, oral, Weekly  gabapentin, 400 mg, oral, TID  insulin regular, 0-10 Units, subcutaneous, TID with meals  ipratropium-albuteroL, 3 mL, nebulization, TID  levothyroxine, 137 mcg, oral, Daily  [Held by provider] losartan, 50 mg, oral, Daily  melatonin, 10 mg, oral, Nightly  metFORMIN, 1,000 mg, oral, BID with meals  [Held by provider] metoprolol succinate XL, 25 mg, oral, Daily  polyethylene glycol, 17 g, oral, Daily  potassium chloride CR, 20 mEq, oral, Daily  rivaroxaban, 2.5 mg, oral, BID  tiotropium, 2 Inhalation, inhalation, Daily  topiramate, 25 mg, oral, Nightly  [Held by provider] torsemide, 40 mg, oral, Daily        ECG 12 lead    Result Date: 2/7/2024  Normal sinus rhythm Left ventricular hypertrophy with repolarization abnormality Inferior infarct (cited on or before 07-FEB-2024) Abnormal ECG When compared with ECG of 07-FEB-2024 16:00, (unconfirmed) No significant change was found See ED provider note for full interpretation and clinical correlation Confirmed by Osiel Marshall ([id number]) on 2/7/2024 10:53:11 PM    CT angio chest for pulmonary embolism    Result Date: 2/7/2024  STUDY: CT Angiogram of the Chest; 02/07/2024 at 7:14 PM INDICATION: Shortness of breath, lightheadedness. Elevated D-dimer.  Evaluate for pulmonary embolism. COMPARISON: XR chest of same date, 02/07/24. CTA chest 02/01/23. CTA CAP 07/27/23. ACCESSION NUMBER(S): [id number] ORDERING CLINICIAN: RODRIGO PUTNAM TECHNIQUE:  CTA of the chest was performed with intravenous contrast. Images are reviewed and processed at a workstation according to the CT angiogram protocol with 3-D and/or MIP post processing imaging generated.  Omnipaque-350 75 mL was administered intravenously. Automated mA/kV exposure control was utilized and patient examination was performed in strict accordance with principles of ALARA. FINDINGS: There are no detectable pulmonary emboli. The pulmonary arteries are normal caliber. The heart is normal overall size with minimal LV dilatation. There are minimal coronary artery calcified lesions. There is no thoracic aortic aneurysm or dissection. There is minimal atherosclerotic thrombus in the mid descending thoracic aorta. Small lymph nodes are seen in the mediastinum. There is no bulky lymphadenopathy or focal mass. There is no pericardial effusion. The lungs demonstrate minimal to moderate upper lobe-predominant centrilobular emphysema, in addition to minimal bullous emphysema at the left lung base. There is minimal biapical scarring, with additional focal scarring or atelectasis in the lung bases. There are no pleural effusions. There is no pneumothorax. Previous extensive surgery is seen in the lower thoracic and lumbar spine, with spinal rods in place. Rib cage appears intact. Upper abdomen suggests subtle cirrhotic morphology in the liver.    1. No detectable pulmonary emboli. 2. COPD; no infiltrates or vascular congestion. 3. Remainder as above. Signed by Micheal Diop MD    XR chest 1 view    Result Date: 2/7/2024  STUDY: Chest Radiograph;  2/7/2024 at 4:59 PM. INDICATION: Shortness of breath. COMPARISON: CTA CAP 7/27/2023; XR chest 6/24/2023, 3/1/2023; CTA chest 2/1/2023. ACCESSION NUMBER(S): [id number] ORDERING  CLINICIAN: RODRIGO PUTNAM TECHNIQUE:  Frontal chest was obtained at 16:59 hours (two images). FINDINGS: CARDIOMEDIASTINAL SILHOUETTE: Cardiomediastinal silhouette is normal in size and configuration.  LUNGS: Lungs are clear.  ABDOMEN: No remarkable upper abdominal findings.  BONES: No acute osseous changes.    No acute pulmonary pathology. Signed by Tra Garcia MD       Assessment/Plan   Principal Problem:    Hypotension, unspecified hypotension type  Active Problems:    PVD (peripheral vascular disease) (CMS/HCC)    Primary hypertension    Combined systolic and diastolic cardiac dysfunction    Gastroesophageal reflux disease without esophagitis    Hypothyroidism    Major depressive disorder, single episode, moderate (CMS/HCC)    Osteoarthrosis    CAD (coronary artery disease)    Hypertension associated with type 2 diabetes mellitus (CMS/HCC)    PAD (peripheral artery disease) (CMS/HCC)    Subclavian artery stenosis, left (CMS/HCC)            I spent   minutes in the professional and overall care of this patient.      Tommy Dietz MD

## 2024-02-10 NOTE — CONSULTS
Consults  Referred by Dr J Luis Hicks MD: Dagoberto Baxter MD    Reason For Consult  Lactic acidosis, possible sepsis    History Of Present Illness  Daphney Chen is a 64 y.o. female with past medical history of diabetes mellitus type 2 peripheral arterial disease hypertension septic thrombophlebitis left arm, CHF GERD hypothyroidism major depression degenerative disc disease of lumbar spine with history of laminectomy nicotine use lumbar radiculopathy abdominal aortic aneurysm left carotid artery stenosis coronary artery disease hypertension hyperlipidemia, subclavian artery stenosis, recent right breast biopsy, was admitted on  with lightheadedness, progressive shortness of breath, severe fatigue and generalized weakness.  Patient denies any cough or fevers.  Patient reported malodorous urine with frequency.  Was found to have E. coli UTI.  Was started on IV Rocephin 2 days ago.  Patient became hypotensive yesterday, some tachycardia reported.  Positive hypothermia.  Was started on IV fluid yesterday with lactated Ringer.  Patient had severe hyperglycemia.  She reports being thirsty all the time.  Lactic acid level on admission was elevated and persist to be elevated.      Past Medical History  She has no past medical history on file.    Surgical History  She has a past surgical history that includes CT angio aorta and bilateral iliofemoral runoff w and or wo IV contrast (2023); Carotid stent; Cardiac surgery; Tonsillectomy; Lumbar laminectomy; Total knee arthroplasty; Anterior compartment decompression; Breast lumpectomy; and Dilation and curettage of uterus.     Social History     Occupational History    Not on file   Tobacco Use    Smoking status: Former     Types: Cigarettes     Quit date:      Years since quittin.1    Smokeless tobacco: Never   Vaping Use    Vaping Use: Never used   Substance and Sexual Activity    Alcohol use: Not Currently    Drug use: Never    Sexual  activity: Defer     Travel History   Travel since 01/10/24    No documented travel since 01/10/24            Family History  Family History   Problem Relation Name Age of Onset    Diabetes Mother      Hypertension Mother      Heart attack Mother      Heart attack Daughter      Other (blood cancer) Daughter       Allergies  Morphine     Immunization History   Administered Date(s) Administered    Moderna SARS-CoV-2 Vaccination 04/29/2021, 05/27/2021    PPD Test 06/28/2017, 07/07/2017    Pneumococcal conjugate vaccine, 20-valent (PREVNAR 20) 09/05/2023     Medications  Home medications:  Medications Prior to Admission   Medication Sig Dispense Refill Last Dose    albuterol 90 mcg/actuation inhaler Inhale 2 puffs every 4 hours.       aspirin 81 mg chewable tablet Chew 1 tablet (81 mg) once daily.       atorvastatin (Lipitor) 80 mg tablet Take 1 tablet (80 mg) by mouth once daily at bedtime.       ergocalciferol (Vitamin D-2) 1.25 MG (12839 UT) capsule Take 1 capsule (50,000 Units) by mouth 1 (one) time per week. 12 capsule 3     gabapentin (Neurontin) 400 mg capsule Take 1 capsule (400 mg) by mouth 3 times a day.       levothyroxine (Synthroid, Levoxyl) 137 mcg tablet Take 1 tablet (137 mcg) by mouth once daily. 90 tablet 1     losartan (Cozaar) 50 mg tablet Take 1 tablet (50 mg) by mouth once daily.       melatonin 10 mg tablet Take 1 tablet (10 mg) by mouth once daily at bedtime.       metFORMIN (Glucophage) 1,000 mg tablet Take 1 tablet (1,000 mg) by mouth 2 times a day with meals. 180 tablet 3     metoprolol succinate XL (Toprol-XL) 25 mg 24 hr tablet Take 1 tablet (25 mg) by mouth once daily.       naloxone (Narcan) 4 mg/0.1 mL nasal spray Administer 1 spray (4 mg) into affected nostril(s) if needed for opioid reversal.       nitroglycerin (Nitrostat) 0.4 mg SL tablet Place 1 tablet (0.4 mg) under the tongue every 5 minutes if needed for chest pain.       OneTouch Delica Plus Lancet 33 gauge misc once daily. AS  DIRECTED       OneTouch Verio test strips strip once daily.       oxyCODONE (Roxicodone) 15 mg immediate release tablet Take 1 tablet (15 mg) by mouth every 6 hours if needed.       potassium chloride CR 20 mEq ER tablet TAKE 1 TABLET(20 MEQ) BY MOUTH EVERY DAY. DO NOT CRUSH OR CHEW 90 tablet 3     rivaroxaban (Xarelto) 2.5 mg tablet Take 1 tablet (2.5 mg) by mouth 2 times a day.       Spiriva Respimat 2.5 mcg/actuation inhaler Inhale 2 puffs once daily.       topiramate (Topamax) 25 mg tablet        torsemide (Demadex) 20 mg tablet TAKE 2 TABLETS BY MOUTH EVERY  tablet 3      Current medications:  Scheduled medications  albuterol, 2 puff, inhalation, q4h  aspirin, 81 mg, oral, Daily  atorvastatin, 80 mg, oral, Nightly  cefepime, 1 g, intravenous, q8h  ergocalciferol, 50,000 Units, oral, Weekly  gabapentin, 400 mg, oral, TID  insulin regular, 0-10 Units, subcutaneous, TID with meals  ipratropium-albuteroL, 3 mL, nebulization, TID  levothyroxine, 137 mcg, oral, Daily  [Held by provider] losartan, 50 mg, oral, Daily  melatonin, 10 mg, oral, Nightly  metFORMIN, 1,000 mg, oral, BID with meals  [Held by provider] metoprolol succinate XL, 25 mg, oral, Daily  polyethylene glycol, 17 g, oral, Daily  potassium chloride CR, 20 mEq, oral, Daily  rivaroxaban, 2.5 mg, oral, BID  sodium chloride, 1,000 mL, intravenous, Once  tiotropium, 2 Inhalation, inhalation, Daily  topiramate, 25 mg, oral, Nightly  [Held by provider] torsemide, 40 mg, oral, Daily      Continuous medications  oxygen, , Last Rate: 3 L/min (02/10/24 1449)  sodium chloride 0.9%, 75 mL/hr      PRN medications  PRN medications: acetaminophen **OR** acetaminophen **OR** acetaminophen, acetaminophen **OR** acetaminophen **OR** acetaminophen, dextrose 10 % in water (D10W), dextrose, famotidine **OR** famotidine, glucagon, ipratropium-albuteroL, naloxone, nitroglycerin, ondansetron **OR** ondansetron, oxyCODONE    Review of Systems  14 system review otherwise is  negative  Objective  Range of Vitals (last 24 hours)  Heart Rate:  []   Temp:  [35.9 °C (96.6 °F)-36.6 °C (97.9 °F)]   Resp:  [16-18]   BP: ()/(55-67)   SpO2:  [91 %-97 %]   Daily Weight  02/07/24 : 106 kg (233 lb 0.4 oz)    Body mass index is 35.43 kg/m².     Physical Exam     Vitals:    02/10/24 0725 02/10/24 0800 02/10/24 1412 02/10/24 1449   BP: 80/63  98/67    BP Location:       Patient Position:       Pulse:   101    Resp:       Temp: 36.1 °C (97 °F)  36.6 °C (97.9 °F)    TempSrc:       SpO2: 96% 96% 91% 94%   Weight:       Height:         General Appearance: alert and oriented to person, place and time, well-developed and well-nourished, in no acute distress  On 3 L nasal cannula  Skin: warm and dry, no rash.   Head: normocephalic and atraumatic  Eyes: anicteric sclerae  ENT:  normal mucous membranes. No oral thrush  Lungs: normal respiratory effort, right basilar crackles, diminished breath sounds bilateral lung fields  Heart normal S1-S2 no murmur  Healed right chest incision  Healed lower back incision without tenderness  Abdomen: soft, no tenderness, obese, no megaly, no CVA tenderness  Trace leg edema  No erythema, no tenderness  Left arm healed incision  Labs  Results from last 72 hours   Lab Units 02/08/24  0544   WBC AUTO x10*3/uL 4.7   HEMOGLOBIN g/dL 12.4   HEMATOCRIT % 40.3   PLATELETS AUTO x10*3/uL 211     Results from last 72 hours   Lab Units 02/09/24  1425 02/08/24  0544   SODIUM mmol/L 137 135*   POTASSIUM mmol/L 3.8 4.5   CHLORIDE mmol/L 102 99   CO2 mmol/L 29 25   BUN mg/dL 18 10   CREATININE mg/dL 0.86 0.94   GLUCOSE mg/dL 251* 531*   CALCIUM mg/dL 8.4* 9.0   ANION GAP mmol/L 10 16   EGFR mL/min/1.73m*2 75 67     Results from last 72 hours   Lab Units 02/08/24  0544   ALK PHOS U/L 130   BILIRUBIN TOTAL mg/dL 0.6   PROTEIN TOTAL g/dL 6.6   ALT U/L 38   AST U/L 43*   ALBUMIN g/dL 3.3*     Microbiology  Susceptibility data from last 90 days.  Collected Specimen Info Organism  Ampicillin Cefazolin Cefazolin (uncomplicated UTIs only) Ciprofloxacin Gentamicin Nitrofurantoin Piperacillin/Tazobactam Trimethoprim/Sulfamethoxazole   02/07/24 Urine from Clean Catch/Voided Escherichia coli S S S S S S S S   Imaging  CT angiogram of the chest was negative for PE on admission  COPD  Assessment/Plan       IMPRESSION:    Complicated E. coli UTI  Hypotension and lactic acidosis, concern for sepsis  Uncontrolled diabetes mellitus type 2 with severe hyperglycemia  Acute COPD exacerbation    PLAN:  Change Rocephin to cefepime because of drug interaction with lactated Ringer  Repeat urinalysis with reflex culture  Postvoid bladder scan to rule out urine retention  Recommend CT of abdomen and pelvis if patient persist to be hypotensive despite IV fluid resuscitation to rule out other sources of sepsis  May consider endocrinology consult for better glucose control and to rule out possibility of drug induced lactic acidosis related to metformin  CRP in a.m.    Discussed with patient and , RN    I spent 50 minutes in the professional and overall care of this patient.      Billie Lu MD

## 2024-02-11 LAB
BACTERIA BLD CULT: NORMAL
BACTERIA BLD CULT: NORMAL
ERYTHROCYTE [DISTWIDTH] IN BLOOD BY AUTOMATED COUNT: 15.6 % (ref 11.5–14.5)
GLUCOSE BLD MANUAL STRIP-MCNC: 242 MG/DL (ref 74–99)
GLUCOSE BLD MANUAL STRIP-MCNC: 265 MG/DL (ref 74–99)
GLUCOSE BLD MANUAL STRIP-MCNC: 270 MG/DL (ref 74–99)
HCT VFR BLD AUTO: 38.1 % (ref 36–46)
HGB BLD-MCNC: 11.5 G/DL (ref 12–16)
HOLD SPECIMEN: NORMAL
MCH RBC QN AUTO: 27.6 PG (ref 26–34)
MCHC RBC AUTO-ENTMCNC: 30.2 G/DL (ref 32–36)
MCV RBC AUTO: 92 FL (ref 80–100)
NRBC BLD-RTO: 0 /100 WBCS (ref 0–0)
PLATELET # BLD AUTO: 186 X10*3/UL (ref 150–450)
RBC # BLD AUTO: 4.16 X10*6/UL (ref 4–5.2)
WBC # BLD AUTO: 5.2 X10*3/UL (ref 4.4–11.3)

## 2024-02-11 PROCEDURE — 82947 ASSAY GLUCOSE BLOOD QUANT: CPT

## 2024-02-11 PROCEDURE — 86141 C-REACTIVE PROTEIN HS: CPT | Mod: ELYLAB | Performed by: INTERNAL MEDICINE

## 2024-02-11 PROCEDURE — 2500000004 HC RX 250 GENERAL PHARMACY W/ HCPCS (ALT 636 FOR OP/ED): Performed by: INTERNAL MEDICINE

## 2024-02-11 PROCEDURE — 1210000001 HC SEMI-PRIVATE ROOM DAILY

## 2024-02-11 PROCEDURE — 2500000002 HC RX 250 W HCPCS SELF ADMINISTERED DRUGS (ALT 637 FOR MEDICARE OP, ALT 636 FOR OP/ED): Performed by: FAMILY MEDICINE

## 2024-02-11 PROCEDURE — 94640 AIRWAY INHALATION TREATMENT: CPT

## 2024-02-11 PROCEDURE — 99233 SBSQ HOSP IP/OBS HIGH 50: CPT | Performed by: FAMILY MEDICINE

## 2024-02-11 PROCEDURE — 2500000001 HC RX 250 WO HCPCS SELF ADMINISTERED DRUGS (ALT 637 FOR MEDICARE OP): Performed by: FAMILY MEDICINE

## 2024-02-11 PROCEDURE — 99233 SBSQ HOSP IP/OBS HIGH 50: CPT | Performed by: INTERNAL MEDICINE

## 2024-02-11 PROCEDURE — 85027 COMPLETE CBC AUTOMATED: CPT | Performed by: INTERNAL MEDICINE

## 2024-02-11 PROCEDURE — 36415 COLL VENOUS BLD VENIPUNCTURE: CPT | Performed by: INTERNAL MEDICINE

## 2024-02-11 RX ADMIN — Medication 10 MG: at 21:12

## 2024-02-11 RX ADMIN — OXYCODONE HYDROCHLORIDE 15 MG: 5 TABLET ORAL at 23:31

## 2024-02-11 RX ADMIN — GABAPENTIN 400 MG: 400 CAPSULE ORAL at 15:26

## 2024-02-11 RX ADMIN — IPRATROPIUM BROMIDE AND ALBUTEROL SULFATE 3 ML: 2.5; .5 SOLUTION RESPIRATORY (INHALATION) at 06:57

## 2024-02-11 RX ADMIN — INSULIN HUMAN 4 UNITS: 100 INJECTION, SOLUTION PARENTERAL at 08:46

## 2024-02-11 RX ADMIN — ALBUTEROL SULFATE 2 PUFF: 90 AEROSOL, METERED RESPIRATORY (INHALATION) at 12:00

## 2024-02-11 RX ADMIN — METFORMIN HYDROCHLORIDE 1000 MG: 500 TABLET, FILM COATED ORAL at 08:46

## 2024-02-11 RX ADMIN — ALBUTEROL SULFATE 2 PUFF: 90 AEROSOL, METERED RESPIRATORY (INHALATION) at 00:24

## 2024-02-11 RX ADMIN — OXYCODONE HYDROCHLORIDE 15 MG: 5 TABLET ORAL at 11:24

## 2024-02-11 RX ADMIN — ALBUTEROL SULFATE 2 PUFF: 90 AEROSOL, METERED RESPIRATORY (INHALATION) at 15:30

## 2024-02-11 RX ADMIN — IPRATROPIUM BROMIDE AND ALBUTEROL SULFATE 3 ML: 2.5; .5 SOLUTION RESPIRATORY (INHALATION) at 13:15

## 2024-02-11 RX ADMIN — POTASSIUM CHLORIDE 20 MEQ: 1500 TABLET, EXTENDED RELEASE ORAL at 08:45

## 2024-02-11 RX ADMIN — RIVAROXABAN 2.5 MG: 2.5 TABLET, FILM COATED ORAL at 08:46

## 2024-02-11 RX ADMIN — ACETAMINOPHEN 650 MG: 325 TABLET ORAL at 11:23

## 2024-02-11 RX ADMIN — SODIUM CHLORIDE 75 ML/HR: 9 INJECTION, SOLUTION INTRAVENOUS at 11:43

## 2024-02-11 RX ADMIN — CEFEPIME 1 G: 1 INJECTION, POWDER, FOR SOLUTION INTRAMUSCULAR; INTRAVENOUS at 00:24

## 2024-02-11 RX ADMIN — ASPIRIN 81 MG: 81 TABLET, CHEWABLE ORAL at 08:45

## 2024-02-11 RX ADMIN — CEFEPIME 1 G: 1 INJECTION, POWDER, FOR SOLUTION INTRAMUSCULAR; INTRAVENOUS at 23:28

## 2024-02-11 RX ADMIN — METFORMIN HYDROCHLORIDE 1000 MG: 500 TABLET, FILM COATED ORAL at 16:13

## 2024-02-11 RX ADMIN — ATORVASTATIN CALCIUM 80 MG: 80 TABLET, FILM COATED ORAL at 21:12

## 2024-02-11 RX ADMIN — SODIUM CHLORIDE 1000 ML: 9 INJECTION, SOLUTION INTRAVENOUS at 11:45

## 2024-02-11 RX ADMIN — RIVAROXABAN 2.5 MG: 2.5 TABLET, FILM COATED ORAL at 21:12

## 2024-02-11 RX ADMIN — ALBUTEROL SULFATE 2 PUFF: 90 AEROSOL, METERED RESPIRATORY (INHALATION) at 20:15

## 2024-02-11 RX ADMIN — GABAPENTIN 400 MG: 400 CAPSULE ORAL at 08:45

## 2024-02-11 RX ADMIN — GABAPENTIN 400 MG: 400 CAPSULE ORAL at 21:12

## 2024-02-11 RX ADMIN — TIOTROPIUM BROMIDE INHALATION SPRAY 2 PUFF: 3.12 SPRAY, METERED RESPIRATORY (INHALATION) at 08:45

## 2024-02-11 RX ADMIN — OXYCODONE HYDROCHLORIDE 15 MG: 5 TABLET ORAL at 03:03

## 2024-02-11 RX ADMIN — CEFEPIME 1 G: 1 INJECTION, POWDER, FOR SOLUTION INTRAMUSCULAR; INTRAVENOUS at 15:27

## 2024-02-11 RX ADMIN — LEVOTHYROXINE SODIUM 137 MCG: 25 TABLET ORAL at 05:57

## 2024-02-11 RX ADMIN — IPRATROPIUM BROMIDE AND ALBUTEROL SULFATE 3 ML: 2.5; .5 SOLUTION RESPIRATORY (INHALATION) at 20:50

## 2024-02-11 RX ADMIN — TOPIRAMATE 25 MG: 25 TABLET ORAL at 21:12

## 2024-02-11 RX ADMIN — ALBUTEROL SULFATE 2 PUFF: 90 AEROSOL, METERED RESPIRATORY (INHALATION) at 23:22

## 2024-02-11 RX ADMIN — INSULIN HUMAN 6 UNITS: 100 INJECTION, SOLUTION PARENTERAL at 11:21

## 2024-02-11 RX ADMIN — ALBUTEROL SULFATE 2 PUFF: 90 AEROSOL, METERED RESPIRATORY (INHALATION) at 04:50

## 2024-02-11 RX ADMIN — ALBUTEROL SULFATE 2 PUFF: 90 AEROSOL, METERED RESPIRATORY (INHALATION) at 08:44

## 2024-02-11 RX ADMIN — CEFEPIME 1 G: 1 INJECTION, POWDER, FOR SOLUTION INTRAMUSCULAR; INTRAVENOUS at 08:44

## 2024-02-11 RX ADMIN — INSULIN HUMAN 6 UNITS: 100 INJECTION, SOLUTION PARENTERAL at 16:10

## 2024-02-11 RX ADMIN — OXYCODONE HYDROCHLORIDE 15 MG: 5 TABLET ORAL at 17:39

## 2024-02-11 ASSESSMENT — PAIN SCALES - GENERAL
PAINLEVEL_OUTOF10: 1
PAINLEVEL_OUTOF10: 5 - MODERATE PAIN
PAINLEVEL_OUTOF10: 6
PAINLEVEL_OUTOF10: 6
PAINLEVEL_OUTOF10: 8
PAINLEVEL_OUTOF10: 7

## 2024-02-11 ASSESSMENT — COGNITIVE AND FUNCTIONAL STATUS - GENERAL
MOVING TO AND FROM BED TO CHAIR: A LITTLE
DAILY ACTIVITIY SCORE: 19
DRESSING REGULAR UPPER BODY CLOTHING: A LITTLE
TOILETING: A LITTLE
STANDING UP FROM CHAIR USING ARMS: A LITTLE
DRESSING REGULAR LOWER BODY CLOTHING: A LITTLE
MOBILITY SCORE: 20
WALKING IN HOSPITAL ROOM: A LITTLE
PERSONAL GROOMING: A LITTLE
HELP NEEDED FOR BATHING: A LITTLE
CLIMB 3 TO 5 STEPS WITH RAILING: A LITTLE

## 2024-02-11 ASSESSMENT — PAIN - FUNCTIONAL ASSESSMENT
PAIN_FUNCTIONAL_ASSESSMENT: 0-10

## 2024-02-11 ASSESSMENT — PAIN DESCRIPTION - LOCATION
LOCATION: BACK

## 2024-02-11 NOTE — PROGRESS NOTES
"Daphney Chen is a 64 y.o. female on day 4 of admission presenting with Hypotension, unspecified hypotension type.    Subjective   Hospital follow-up.  Patient seen at the bedside.  Appreciate cardiology and infectious disease consultations.  Antibiotics changed.  Continues IV fluid resuscitation.  Awaiting endocrinology recommendations.  Patient seen this morning.  Feels a little better this morning.  States breathing is stable.  Spoke with nursing at bedside.  Continues on IV antibiotics.  IV fluid resuscitation.       Objective     Physical Exam  Nasal cannula oxygen in place.  Looks tired but no acute distress.  Scattered rhonchi.  Distant heart sounds.  Soft normoactive bowel sounds  Last Recorded Vitals  Blood pressure 93/58, pulse 88, temperature 35.8 °C (96.4 °F), resp. rate 12, height 1.727 m (5' 8\"), weight 106 kg (233 lb 0.4 oz), SpO2 90 %.  Intake/Output last 3 Shifts:  I/O last 3 completed shifts:  In: 3064.9 (29 mL/kg) [I.V.:1065 (10.1 mL/kg); IV Piggyback:1999.9]  Out: 600 (5.7 mL/kg) [Urine:600 (0.2 mL/kg/hr)]  Weight: 105.7 kg     Relevant Results  Recent Results (from the past 72 hour(s))   POCT GLUCOSE    Collection Time: 02/08/24  7:32 PM   Result Value Ref Range    POCT Glucose 348 (H) 74 - 99 mg/dL   POCT GLUCOSE    Collection Time: 02/09/24  7:19 AM   Result Value Ref Range    POCT Glucose 333 (H) 74 - 99 mg/dL   POCT GLUCOSE    Collection Time: 02/09/24 10:59 AM   Result Value Ref Range    POCT Glucose 280 (H) 74 - 99 mg/dL   Basic Metabolic Panel    Collection Time: 02/09/24  2:25 PM   Result Value Ref Range    Glucose 251 (H) 74 - 99 mg/dL    Sodium 137 136 - 145 mmol/L    Potassium 3.8 3.5 - 5.3 mmol/L    Chloride 102 98 - 107 mmol/L    Bicarbonate 29 21 - 32 mmol/L    Anion Gap 10 10 - 20 mmol/L    Urea Nitrogen 18 6 - 23 mg/dL    Creatinine 0.86 0.50 - 1.05 mg/dL    eGFR 75 >60 mL/min/1.73m*2    Calcium 8.4 (L) 8.6 - 10.3 mg/dL   Lactate    Collection Time: 02/09/24  2:25 PM   Result " Value Ref Range    Lactate 3.2 (H) 0.4 - 2.0 mmol/L   SST TOP    Collection Time: 02/09/24  2:25 PM   Result Value Ref Range    Extra Tube Hold for add-ons.    POCT GLUCOSE    Collection Time: 02/09/24  4:01 PM   Result Value Ref Range    POCT Glucose 244 (H) 74 - 99 mg/dL   Lactate    Collection Time: 02/09/24  8:01 PM   Result Value Ref Range    Lactate 2.2 (H) 0.4 - 2.0 mmol/L   POCT GLUCOSE    Collection Time: 02/09/24 10:26 PM   Result Value Ref Range    POCT Glucose 264 (H) 74 - 99 mg/dL   POCT GLUCOSE    Collection Time: 02/10/24  6:36 AM   Result Value Ref Range    POCT Glucose 273 (H) 74 - 99 mg/dL   POCT GLUCOSE    Collection Time: 02/10/24 11:06 AM   Result Value Ref Range    POCT Glucose 260 (H) 74 - 99 mg/dL   POCT GLUCOSE    Collection Time: 02/10/24  4:26 PM   Result Value Ref Range    POCT Glucose 291 (H) 74 - 99 mg/dL   Urinalysis with Reflex Microscopic    Collection Time: 02/10/24  5:59 PM   Result Value Ref Range    Color, Urine Straw Straw, Yellow    Appearance, Urine Clear Clear    Specific Gravity, Urine 1.009 1.005 - 1.035    pH, Urine 7.0 5.0, 5.5, 6.0, 6.5, 7.0, 7.5, 8.0    Protein, Urine NEGATIVE NEGATIVE mg/dL    Glucose, Urine >=500 (3+) (A) NEGATIVE mg/dL    Blood, Urine NEGATIVE NEGATIVE    Ketones, Urine NEGATIVE NEGATIVE mg/dL    Bilirubin, Urine NEGATIVE NEGATIVE    Urobilinogen, Urine 2.0 (N) <2.0 mg/dL    Nitrite, Urine NEGATIVE NEGATIVE    Leukocyte Esterase, Urine NEGATIVE NEGATIVE   POCT GLUCOSE    Collection Time: 02/10/24  8:50 PM   Result Value Ref Range    POCT Glucose 221 (H) 74 - 99 mg/dL   POCT GLUCOSE    Collection Time: 02/11/24  6:52 AM   Result Value Ref Range    POCT Glucose 242 (H) 74 - 99 mg/dL   CBC    Collection Time: 02/11/24  9:37 AM   Result Value Ref Range    WBC 5.2 4.4 - 11.3 x10*3/uL    nRBC 0.0 0.0 - 0.0 /100 WBCs    RBC 4.16 4.00 - 5.20 x10*6/uL    Hemoglobin 11.5 (L) 12.0 - 16.0 g/dL    Hematocrit 38.1 36.0 - 46.0 %    MCV 92 80 - 100 fL    MCH 27.6  26.0 - 34.0 pg    MCHC 30.2 (L) 32.0 - 36.0 g/dL    RDW 15.6 (H) 11.5 - 14.5 %    Platelets 186 150 - 450 x10*3/uL   SST TOP    Collection Time: 02/11/24  9:37 AM   Result Value Ref Range    Extra Tube Hold for add-ons.    POCT GLUCOSE    Collection Time: 02/11/24 11:02 AM   Result Value Ref Range    POCT Glucose 270 (H) 74 - 99 mg/dL   POCT GLUCOSE    Collection Time: 02/11/24  3:50 PM   Result Value Ref Range    POCT Glucose 265 (H) 74 - 99 mg/dL                          albuterol, 2 puff, inhalation, q4h  aspirin, 81 mg, oral, Daily  atorvastatin, 80 mg, oral, Nightly  cefepime, 1 g, intravenous, q8h  ergocalciferol, 50,000 Units, oral, Weekly  gabapentin, 400 mg, oral, TID  insulin regular, 0-10 Units, subcutaneous, TID with meals  ipratropium-albuteroL, 3 mL, nebulization, TID  levothyroxine, 137 mcg, oral, Daily  [Held by provider] losartan, 50 mg, oral, Daily  melatonin, 10 mg, oral, Nightly  metFORMIN, 1,000 mg, oral, BID with meals  [Held by provider] metoprolol succinate XL, 25 mg, oral, Daily  polyethylene glycol, 17 g, oral, Daily  potassium chloride CR, 20 mEq, oral, Daily  rivaroxaban, 2.5 mg, oral, BID  tiotropium, 2 Inhalation, inhalation, Daily  topiramate, 25 mg, oral, Nightly  [Held by provider] torsemide, 40 mg, oral, Daily        ECG 12 lead    Result Date: 2/7/2024  Normal sinus rhythm Left ventricular hypertrophy with repolarization abnormality Inferior infarct (cited on or before 07-FEB-2024) Abnormal ECG When compared with ECG of 07-FEB-2024 16:00, (unconfirmed) No significant change was found See ED provider note for full interpretation and clinical correlation Confirmed by Osiel Marshall ([id number]) on 2/7/2024 10:53:11 PM    CT angio chest for pulmonary embolism    Result Date: 2/7/2024  STUDY: CT Angiogram of the Chest; 02/07/2024 at 7:14 PM INDICATION: Shortness of breath, lightheadedness. Elevated D-dimer. Evaluate for pulmonary embolism. COMPARISON: XR chest of same date, 02/07/24. CTA  chest 02/01/23. CTA CAP 07/27/23. ACCESSION NUMBER(S): [id number] ORDERING CLINICIAN: RODRIGO PUTNAM TECHNIQUE:  CTA of the chest was performed with intravenous contrast. Images are reviewed and processed at a workstation according to the CT angiogram protocol with 3-D and/or MIP post processing imaging generated.  Omnipaque-350 75 mL was administered intravenously. Automated mA/kV exposure control was utilized and patient examination was performed in strict accordance with principles of ALARA. FINDINGS: There are no detectable pulmonary emboli. The pulmonary arteries are normal caliber. The heart is normal overall size with minimal LV dilatation. There are minimal coronary artery calcified lesions. There is no thoracic aortic aneurysm or dissection. There is minimal atherosclerotic thrombus in the mid descending thoracic aorta. Small lymph nodes are seen in the mediastinum. There is no bulky lymphadenopathy or focal mass. There is no pericardial effusion. The lungs demonstrate minimal to moderate upper lobe-predominant centrilobular emphysema, in addition to minimal bullous emphysema at the left lung base. There is minimal biapical scarring, with additional focal scarring or atelectasis in the lung bases. There are no pleural effusions. There is no pneumothorax. Previous extensive surgery is seen in the lower thoracic and lumbar spine, with spinal rods in place. Rib cage appears intact. Upper abdomen suggests subtle cirrhotic morphology in the liver.    1. No detectable pulmonary emboli. 2. COPD; no infiltrates or vascular congestion. 3. Remainder as above. Signed by Micheal Diop MD    XR chest 1 view    Result Date: 2/7/2024  STUDY: Chest Radiograph;  2/7/2024 at 4:59 PM. INDICATION: Shortness of breath. COMPARISON: CTA CAP 7/27/2023; XR chest 6/24/2023, 3/1/2023; CTA chest 2/1/2023. ACCESSION NUMBER(S): [id number] ORDERING CLINICIAN: RODRIGO PUTNAM TECHNIQUE:  Frontal chest was obtained at 16:59 hours (two  images). FINDINGS: CARDIOMEDIASTINAL SILHOUETTE: Cardiomediastinal silhouette is normal in size and configuration.  LUNGS: Lungs are clear.  ABDOMEN: No remarkable upper abdominal findings.  BONES: No acute osseous changes.    No acute pulmonary pathology. Signed by Tra Garcia MD       Assessment/Plan   Principal Problem:    Hypotension, unspecified hypotension type  Active Problems:    PVD (peripheral vascular disease) (CMS/HCC)    Primary hypertension    Combined systolic and diastolic cardiac dysfunction    Gastroesophageal reflux disease without esophagitis    Hypothyroidism    Major depressive disorder, single episode, moderate (CMS/HCC)    Osteoarthrosis    CAD (coronary artery disease)    Hypertension associated with type 2 diabetes mellitus (CMS/HCC)    PAD (peripheral artery disease) (CMS/HCC)    Subclavian artery stenosis, left (CMS/HCC)            I spent   minutes in the professional and overall care of this patient.      Tommy Dietz MD

## 2024-02-11 NOTE — PROGRESS NOTES
[facility] Progress Note           Rounding Cardiologist:  Dr. Jose Murry  Primary Cardiologist: Dr. Ronald Wellington    Date:  2/11/2024  Patient:  Daphney Chen  YOB: 1960  MRN:  [id number]   Admit Date:  2/7/2024      SUBJECTIVE:    Daphney Chen was seen and examined today at bedside.   She denies any chest pain or shortness of breath.   Her overnight history is symptomatic with ongoing hypotension but she does state that she is feeling a little bit better.  Pressures have been somewhat improved after fluid bolus yesterday.      VITALS:     Vitals:    02/10/24 2040 02/10/24 2309 02/11/24 0359 02/11/24 0749   BP:  93/54 95/63 93/58   BP Location:   Right arm    Patient Position:  Sitting Lying    Pulse:  91 79 88   Resp:  16 12    Temp:  36 °C (96.8 °F) 35.9 °C (96.6 °F) 35.8 °C (96.4 °F)   TempSrc:  Temporal Temporal    SpO2: 95% 93% 94% 90%   Weight:       Height:           Intake/Output Summary (Last 24 hours) at 2/11/2024 1127  Last data filed at 2/11/2024 0655  Gross per 24 hour   Intake 2064.9 ml   Output 600 ml   Net 1464.9 ml         Wt Readings from Last 4 Encounters:   02/07/24 106 kg (233 lb 0.4 oz)   01/31/24 105 kg (232 lb)   01/30/24 105 kg (232 lb)   12/05/23 106 kg (234 lb)       CURRENT MEDICATIONS:   albuterol, 2 puff, inhalation, q4h  aspirin, 81 mg, oral, Daily  atorvastatin, 80 mg, oral, Nightly  cefepime, 1 g, intravenous, q8h  ergocalciferol, 50,000 Units, oral, Weekly  gabapentin, 400 mg, oral, TID  insulin regular, 0-10 Units, subcutaneous, TID with meals  ipratropium-albuteroL, 3 mL, nebulization, TID  levothyroxine, 137 mcg, oral, Daily  [Held by provider] losartan, 50 mg, oral, Daily  melatonin, 10 mg, oral, Nightly  metFORMIN, 1,000 mg, oral, BID with meals  [Held by provider] metoprolol succinate XL, 25 mg, oral, Daily  polyethylene glycol, 17 g, oral, Daily  potassium chloride CR, 20 mEq, oral, Daily  rivaroxaban, 2.5 mg, oral, BID  sodium chloride, 1,000 mL,  intravenous, Once  tiotropium, 2 Inhalation, inhalation, Daily  topiramate, 25 mg, oral, Nightly  [Held by provider] torsemide, 40 mg, oral, Daily      oxygen, , Last Rate: 3 L/min (02/10/24 2040)  sodium chloride 0.9%, 75 mL/hr, Last Rate: 75 mL/hr (02/11/24 0655)      Current Outpatient Medications   Medication Instructions    albuterol 90 mcg/actuation inhaler 2 puffs, inhalation, Every 4 hours    aspirin 81 mg chewable tablet 1 tablet, oral, Daily    atorvastatin (LIPITOR) 80 mg, oral, Nightly    cefdinir (OMNICEF) 300 mg, oral, 2 times daily    ergocalciferol (VITAMIN D-2) 50,000 Units, oral, Weekly    gabapentin (NEURONTIN) 400 mg, oral, 3 times daily    levothyroxine (SYNTHROID, LEVOXYL) 137 mcg, oral, Daily    losartan (COZAAR) 50 mg, oral, Daily    melatonin 10 mg tablet 1 tablet, oral, Nightly    metFORMIN (GLUCOPHAGE) 1,000 mg, oral, 2 times daily with meals    metoprolol succinate XL (TOPROL-XL) 25 mg, oral, Daily    naloxone (NARCAN) 4 mg, nasal, As needed    nitroglycerin (NITROSTAT) 0.4 mg, sublingual, Every 5 min PRN    OneTouch Delica Plus Lancet 33 gauge misc Daily, AS DIRECTED    OneTouch Verio test strips strip Daily    oxyCODONE (ROXICODONE) 15 mg, oral, Every 6 hours PRN    potassium chloride CR 20 mEq ER tablet TAKE 1 TABLET(20 MEQ) BY MOUTH EVERY DAY. DO NOT CRUSH OR CHEW    rivaroxaban (XARELTO) 2.5 mg, oral, 2 times daily    Spiriva Respimat 2.5 mcg/actuation inhaler 2 puffs, inhalation, Daily    topiramate (Topamax) 25 mg tablet     torsemide (DEMADEX) 40 mg, oral, Daily        PHYSICAL EXAMINATION:   GENERAL:  Well developed, well nourished, in no acute distress.  CHEST:  Symmetric and nontender.  NEURO/PSYCH:  Alert and oriented times three with approppriate behavior and responses.  NECK:  Supple, no JVD, no bruit.  LUNGS: Scattered rhonchi, no wheezing or rales, diminished basal breath sounds  HEART:  Rate and rhythm regular with no evident murmur, no gallop appreciated.        There  are no rubs, clicks or heaves.  EXTREMITIES:  Warm with good color, no clubbing or cyanosis.  There is trace edema noted.  PERIPHERAL VASCULAR:  Pulses present and equally palpable; 2+ throughout.      LAB DATA:     CBC:   Results from last 7 days   Lab Units 02/11/24  0937   WBC AUTO x10*3/uL 5.2   RBC AUTO x10*6/uL 4.16   HEMOGLOBIN g/dL 11.5*   HEMATOCRIT % 38.1   PLATELETS AUTO x10*3/uL 186          CMP:    Results from last 7 days   Lab Units 02/09/24  1425   SODIUM mmol/L 137   POTASSIUM mmol/L 3.8   CHLORIDE mmol/L 102   CO2 mmol/L 29   BUN mg/dL 18   CREATININE mg/dL 0.86   GLUCOSE mg/dL 251*   CALCIUM mg/dL 8.4*         Cardiac Enzymes:    Lab Results   Component Value Date    TROPHS 17 (H) 02/07/2024    TROPHS 17 (H) 02/07/2024    TROPHS 245 (H) 06/25/2023           BNP:   Lab Results   Component Value Date    BNP 43 02/07/2024              HgBA1c:    Lab Results   Component Value Date    HGBA1C 7.8 (H) 11/30/2023       CBC:  Lab Results   Component Value Date    WBC 5.2 02/11/2024    RBC 4.16 02/11/2024    HGB 11.5 (L) 02/11/2024    HCT 38.1 02/11/2024    MCV 92 02/11/2024    MCH 27.6 02/11/2024    MCHC 30.2 (L) 02/11/2024    RDW 15.6 (H) 02/11/2024     02/11/2024       BMP:  Lab Results   Component Value Date     02/09/2024    K 3.8 02/09/2024     02/09/2024    CO2 29 02/09/2024    BUN 18 02/09/2024    CREATININE 0.86 02/09/2024             DIAGNOSTIC TESTING:     ECG 12 lead    Result Date: 2/7/2024  Normal sinus rhythm Left ventricular hypertrophy with repolarization abnormality Inferior infarct (cited on or before 07-FEB-2024) Abnormal ECG When compared with ECG of 07-FEB-2024 16:00, (unconfirmed) No significant change was found See ED provider note for full interpretation and clinical correlation Confirmed by Osiel Marshall ([id number]) on 2/7/2024 10:53:11 PM    CT angio chest for pulmonary embolism    Result Date: 2/7/2024  STUDY: CT Angiogram of the Chest; 02/07/2024 at 7:14 PM  INDICATION: Shortness of breath, lightheadedness. Elevated D-dimer. Evaluate for pulmonary embolism. COMPARISON: XR chest of same date, 02/07/24. CTA chest 02/01/23. CTA CAP 07/27/23. ACCESSION NUMBER(S): [id number] ORDERING CLINICIAN: RODRIGO PUTNAM TECHNIQUE:  CTA of the chest was performed with intravenous contrast. Images are reviewed and processed at a workstation according to the CT angiogram protocol with 3-D and/or MIP post processing imaging generated.  Omnipaque-350 75 mL was administered intravenously. Automated mA/kV exposure control was utilized and patient examination was performed in strict accordance with principles of ALARA. FINDINGS: There are no detectable pulmonary emboli. The pulmonary arteries are normal caliber. The heart is normal overall size with minimal LV dilatation. There are minimal coronary artery calcified lesions. There is no thoracic aortic aneurysm or dissection. There is minimal atherosclerotic thrombus in the mid descending thoracic aorta. Small lymph nodes are seen in the mediastinum. There is no bulky lymphadenopathy or focal mass. There is no pericardial effusion. The lungs demonstrate minimal to moderate upper lobe-predominant centrilobular emphysema, in addition to minimal bullous emphysema at the left lung base. There is minimal biapical scarring, with additional focal scarring or atelectasis in the lung bases. There are no pleural effusions. There is no pneumothorax. Previous extensive surgery is seen in the lower thoracic and lumbar spine, with spinal rods in place. Rib cage appears intact. Upper abdomen suggests subtle cirrhotic morphology in the liver.    1. No detectable pulmonary emboli. 2. COPD; no infiltrates or vascular congestion. 3. Remainder as above. Signed by Micheal Diop MD    XR chest 1 view    Result Date: 2/7/2024  STUDY: Chest Radiograph;  2/7/2024 at 4:59 PM. INDICATION: Shortness of breath. COMPARISON: CTA CAP 7/27/2023; XR chest 6/24/2023, 3/1/2023;  CTA chest 2/1/2023. ACCESSION NUMBER(S): [id number] ORDERING CLINICIAN: RODRIGO PUTNAM TECHNIQUE:  Frontal chest was obtained at 16:59 hours (two images). FINDINGS: CARDIOMEDIASTINAL SILHOUETTE: Cardiomediastinal silhouette is normal in size and configuration.  LUNGS: Lungs are clear.  ABDOMEN: No remarkable upper abdominal findings.  BONES: No acute osseous changes.    No acute pulmonary pathology. Signed by Tra Garcia MD       RADIOLOGY:     CT angio chest for pulmonary embolism   Final Result   1. No detectable pulmonary emboli.   2. COPD; no infiltrates or vascular congestion.   3. Remainder as above.   Signed by Micheal Diop MD      XR chest 1 view   Final Result   No acute pulmonary pathology.   Signed by Tra Garcia MD          PROBLEM LIST     Patient Active Problem List   Diagnosis    Complex regional pain syndrome type 1 of left upper extremity    PVD (peripheral vascular disease) (CMS/HCC)    Primary hypertension    Type 2 diabetes mellitus, with long-term current use of insulin (CMS/HCC)    Vaginal bleeding    Combined systolic and diastolic cardiac dysfunction    Impaired functional mobility, balance, gait, and endurance    Cystitis    Gastroesophageal reflux disease without esophagitis    Hypothyroidism    Major depressive disorder, single episode, moderate (CMS/HCC)    Degeneration of lumbar intervertebral disc    Disseminated intravascular coagulation (CMS/HCC)    Nicotine use disorder    Obesity, Class I, BMI 30-34.9    Osteoarthrosis    Other voice and resonance disorders    Postoperative pain after spinal surgery    Posttraumatic muscle contracture following injury (CMS/HCC)    Pseudarthrosis following spinal fusion    Contusion of lower back    Lumbar radiculopathy    Sprain of sacroiliac ligament    Spinal stenosis of lumbar region    Sprain, low back    Tobacco use disorder    Traumatic compartment syndrome of left upper extremity (CMS/HCC)    Chronic low back pain    Chronic pain of  left upper extremity    Pain of left hand    Degeneration of cervical intervertebral disc    Abnormal computed tomography angiography (CTA)    Ascending aortic aneurysm (CMS/HCC)    Asymptomatic stenosis of left carotid artery    Bilateral leg edema    CAD (coronary artery disease)    Class 2 obesity with body mass index (BMI) of 36.0 to 36.9 in adult    Hypertension associated with type 2 diabetes mellitus (CMS/HCC)    Infrarenal abdominal aortic aneurysm (AAA) without rupture (CMS/HCC)    Leg pain    Mixed diabetic hyperlipidemia associated with type 2 diabetes mellitus (CMS/HCC)    Moderate generalized edema    PAD (peripheral artery disease) (CMS/HCC)    Subclavian artery stenosis, left (CMS/HCC)    Weight gain    Hypotension, unspecified hypotension type       ASSESSMENT:            64-year-old female seen evaluate the bedside in telemetry unit.     Bedside examination evaluation were performed by me.     Chart was reviewed in detail discussed with the patient family and nursing staff.     Impression:  Hypotension persisted in the face of increased lactate and reported urinary tract infection  Normal, cardiac by most recent echo  No active cardiovascular symptoms  Hyperlipidemia  Type 2 diabetes on insulin  COPD  Obesity  Peripheral vascular disease  History of abdominal aortic aneurysm  PLAN:   Recommendation:  Infectious diseases been consulted to rule out any more extensive infection that may be leading to hypotension and lactic acidosis  Will give adequate fluid bolus and ongoing saline solution (should be tolerable based on the patient's normal LV function)  Current to assess any potential of potential in regards to the recent discharge continuation of steroids which may have led to a adrenal type of issue  No additional cardiac measures at this time but we will continue to follow      2/11/2024:  ASSESSMENT:            64-year-old female seen evaluate the bedside in telemetry unit.     Bedside examination  evaluation were performed by me.     Chart was reviewed in detail discussed with the patient family and nursing staff.     Impression:  Hypotension persisted in the face of increased lactate and reported urinary tract infection  Normal cardiac function by most recent echo  No active cardiovascular symptoms  Hyperlipidemia  Type 2 diabetes on insulin  COPD  Obesity  Peripheral vascular disease  History of abdominal aortic aneurysm  PLAN:   Recommendation:  Infectious diseases has been consulted to rule out any more extensive infection that may be leading to hypotension and lactic acidosis, antibiotics changed  Repeat fluid bolus and ongoing saline solution (should be tolerable based on the patient's normal LV function)  Endocrinology to see for diabetic management   Follow-up in a.m.    Please do not hesitate to call with questions.  Electronically signed by Jose Murry DO, on 2/11/2024 at 11:27 AM

## 2024-02-12 ENCOUNTER — APPOINTMENT (OUTPATIENT)
Dept: CARDIOLOGY | Facility: CLINIC | Age: 64
End: 2024-02-12
Payer: MEDICARE

## 2024-02-12 LAB
ANION GAP SERPL CALC-SCNC: 9 MMOL/L (ref 10–20)
BUN SERPL-MCNC: 11 MG/DL (ref 6–23)
CALCIUM SERPL-MCNC: 8.9 MG/DL (ref 8.6–10.3)
CHLORIDE SERPL-SCNC: 103 MMOL/L (ref 98–107)
CO2 SERPL-SCNC: 27 MMOL/L (ref 21–32)
CREAT SERPL-MCNC: 0.67 MG/DL (ref 0.5–1.05)
CRP SERPL HS-MCNC: 3.2 MG/L
EGFRCR SERPLBLD CKD-EPI 2021: >90 ML/MIN/1.73M*2
GLUCOSE BLD MANUAL STRIP-MCNC: 189 MG/DL (ref 74–99)
GLUCOSE BLD MANUAL STRIP-MCNC: 207 MG/DL (ref 74–99)
GLUCOSE BLD MANUAL STRIP-MCNC: 229 MG/DL (ref 74–99)
GLUCOSE BLD MANUAL STRIP-MCNC: 286 MG/DL (ref 74–99)
GLUCOSE BLD MANUAL STRIP-MCNC: 304 MG/DL (ref 74–99)
GLUCOSE SERPL-MCNC: 252 MG/DL (ref 74–99)
HOLD SPECIMEN: NORMAL
HOLD SPECIMEN: NORMAL
POTASSIUM SERPL-SCNC: 4.3 MMOL/L (ref 3.5–5.3)
SODIUM SERPL-SCNC: 135 MMOL/L (ref 136–145)

## 2024-02-12 PROCEDURE — 80048 BASIC METABOLIC PNL TOTAL CA: CPT | Performed by: INTERNAL MEDICINE

## 2024-02-12 PROCEDURE — 2500000004 HC RX 250 GENERAL PHARMACY W/ HCPCS (ALT 636 FOR OP/ED): Performed by: FAMILY MEDICINE

## 2024-02-12 PROCEDURE — 2500000001 HC RX 250 WO HCPCS SELF ADMINISTERED DRUGS (ALT 637 FOR MEDICARE OP): Performed by: INTERNAL MEDICINE

## 2024-02-12 PROCEDURE — 2500000001 HC RX 250 WO HCPCS SELF ADMINISTERED DRUGS (ALT 637 FOR MEDICARE OP): Performed by: FAMILY MEDICINE

## 2024-02-12 PROCEDURE — 82947 ASSAY GLUCOSE BLOOD QUANT: CPT

## 2024-02-12 PROCEDURE — 2500000004 HC RX 250 GENERAL PHARMACY W/ HCPCS (ALT 636 FOR OP/ED): Performed by: INTERNAL MEDICINE

## 2024-02-12 PROCEDURE — 99233 SBSQ HOSP IP/OBS HIGH 50: CPT | Performed by: FAMILY MEDICINE

## 2024-02-12 PROCEDURE — 97535 SELF CARE MNGMENT TRAINING: CPT | Mod: GO,CO

## 2024-02-12 PROCEDURE — 1210000001 HC SEMI-PRIVATE ROOM DAILY

## 2024-02-12 PROCEDURE — 36415 COLL VENOUS BLD VENIPUNCTURE: CPT | Performed by: INTERNAL MEDICINE

## 2024-02-12 PROCEDURE — 97110 THERAPEUTIC EXERCISES: CPT | Mod: GP,CQ

## 2024-02-12 PROCEDURE — 99233 SBSQ HOSP IP/OBS HIGH 50: CPT | Performed by: INTERNAL MEDICINE

## 2024-02-12 PROCEDURE — 2500000002 HC RX 250 W HCPCS SELF ADMINISTERED DRUGS (ALT 637 FOR MEDICARE OP, ALT 636 FOR OP/ED): Performed by: FAMILY MEDICINE

## 2024-02-12 RX ORDER — METOPROLOL SUCCINATE 25 MG/1
12.5 TABLET, EXTENDED RELEASE ORAL DAILY
Status: DISCONTINUED | OUTPATIENT
Start: 2024-02-13 | End: 2024-02-13 | Stop reason: HOSPADM

## 2024-02-12 RX ORDER — KETOROLAC TROMETHAMINE 30 MG/ML
15 INJECTION, SOLUTION INTRAMUSCULAR; INTRAVENOUS EVERY 6 HOURS PRN
Status: DISCONTINUED | OUTPATIENT
Start: 2024-02-12 | End: 2024-02-13 | Stop reason: HOSPADM

## 2024-02-12 RX ORDER — IPRATROPIUM BROMIDE AND ALBUTEROL SULFATE 2.5; .5 MG/3ML; MG/3ML
3 SOLUTION RESPIRATORY (INHALATION) EVERY 4 HOURS PRN
Status: DISCONTINUED | OUTPATIENT
Start: 2024-02-12 | End: 2024-02-13 | Stop reason: HOSPADM

## 2024-02-12 RX ORDER — GLIPIZIDE 5 MG/1
5 TABLET ORAL
Status: DISCONTINUED | OUTPATIENT
Start: 2024-02-12 | End: 2024-02-13 | Stop reason: HOSPADM

## 2024-02-12 RX ADMIN — GABAPENTIN 400 MG: 400 CAPSULE ORAL at 22:45

## 2024-02-12 RX ADMIN — POTASSIUM CHLORIDE 20 MEQ: 1500 TABLET, EXTENDED RELEASE ORAL at 08:13

## 2024-02-12 RX ADMIN — ALBUTEROL SULFATE 2 PUFF: 90 AEROSOL, METERED RESPIRATORY (INHALATION) at 19:30

## 2024-02-12 RX ADMIN — METFORMIN HYDROCHLORIDE 1000 MG: 500 TABLET, FILM COATED ORAL at 17:45

## 2024-02-12 RX ADMIN — TOPIRAMATE 25 MG: 25 TABLET ORAL at 22:45

## 2024-02-12 RX ADMIN — INSULIN HUMAN 4 UNITS: 100 INJECTION, SOLUTION PARENTERAL at 17:47

## 2024-02-12 RX ADMIN — ATORVASTATIN CALCIUM 80 MG: 80 TABLET, FILM COATED ORAL at 22:45

## 2024-02-12 RX ADMIN — RIVAROXABAN 2.5 MG: 2.5 TABLET, FILM COATED ORAL at 08:14

## 2024-02-12 RX ADMIN — METFORMIN HYDROCHLORIDE 1000 MG: 500 TABLET, FILM COATED ORAL at 08:13

## 2024-02-12 RX ADMIN — KETOROLAC TROMETHAMINE 15 MG: 30 INJECTION, SOLUTION INTRAMUSCULAR; INTRAVENOUS at 12:53

## 2024-02-12 RX ADMIN — CEFEPIME 1 G: 1 INJECTION, POWDER, FOR SOLUTION INTRAMUSCULAR; INTRAVENOUS at 17:45

## 2024-02-12 RX ADMIN — Medication 10 MG: at 22:45

## 2024-02-12 RX ADMIN — GABAPENTIN 400 MG: 400 CAPSULE ORAL at 15:12

## 2024-02-12 RX ADMIN — INSULIN HUMAN 4 UNITS: 100 INJECTION, SOLUTION PARENTERAL at 08:15

## 2024-02-12 RX ADMIN — ASPIRIN 81 MG: 81 TABLET, CHEWABLE ORAL at 08:13

## 2024-02-12 RX ADMIN — ALBUTEROL SULFATE 2 PUFF: 90 AEROSOL, METERED RESPIRATORY (INHALATION) at 03:43

## 2024-02-12 RX ADMIN — OXYCODONE HYDROCHLORIDE 15 MG: 5 TABLET ORAL at 17:55

## 2024-02-12 RX ADMIN — LEVOTHYROXINE SODIUM 137 MCG: 25 TABLET ORAL at 06:13

## 2024-02-12 RX ADMIN — TIOTROPIUM BROMIDE INHALATION SPRAY 2 PUFF: 3.12 SPRAY, METERED RESPIRATORY (INHALATION) at 07:06

## 2024-02-12 RX ADMIN — OXYCODONE HYDROCHLORIDE 15 MG: 5 TABLET ORAL at 06:13

## 2024-02-12 RX ADMIN — GLIPIZIDE 5 MG: 5 TABLET ORAL at 06:13

## 2024-02-12 RX ADMIN — RIVAROXABAN 2.5 MG: 2.5 TABLET, FILM COATED ORAL at 22:45

## 2024-02-12 RX ADMIN — ALBUTEROL SULFATE 2 PUFF: 90 AEROSOL, METERED RESPIRATORY (INHALATION) at 15:14

## 2024-02-12 RX ADMIN — GABAPENTIN 400 MG: 400 CAPSULE ORAL at 08:13

## 2024-02-12 RX ADMIN — ALBUTEROL SULFATE 2 PUFF: 90 AEROSOL, METERED RESPIRATORY (INHALATION) at 07:06

## 2024-02-12 RX ADMIN — ALBUTEROL SULFATE 2 PUFF: 90 AEROSOL, METERED RESPIRATORY (INHALATION) at 23:30

## 2024-02-12 RX ADMIN — CEFEPIME 1 G: 1 INJECTION, POWDER, FOR SOLUTION INTRAMUSCULAR; INTRAVENOUS at 08:14

## 2024-02-12 ASSESSMENT — PAIN - FUNCTIONAL ASSESSMENT
PAIN_FUNCTIONAL_ASSESSMENT: 0-10

## 2024-02-12 ASSESSMENT — COGNITIVE AND FUNCTIONAL STATUS - GENERAL
HELP NEEDED FOR BATHING: A LITTLE
CLIMB 3 TO 5 STEPS WITH RAILING: A LOT
MOVING TO AND FROM BED TO CHAIR: A LITTLE
DAILY ACTIVITIY SCORE: 19
CLIMB 3 TO 5 STEPS WITH RAILING: A LITTLE
MOVING TO AND FROM BED TO CHAIR: A LITTLE
MOBILITY SCORE: 20
PERSONAL GROOMING: A LITTLE
MOVING TO AND FROM BED TO CHAIR: A LITTLE
MOBILITY SCORE: 20
TOILETING: A LITTLE
STANDING UP FROM CHAIR USING ARMS: A LITTLE
PERSONAL GROOMING: A LITTLE
DRESSING REGULAR UPPER BODY CLOTHING: A LITTLE
WALKING IN HOSPITAL ROOM: A LITTLE
STANDING UP FROM CHAIR USING ARMS: A LITTLE
DRESSING REGULAR LOWER BODY CLOTHING: A LITTLE
TOILETING: A LITTLE
HELP NEEDED FOR BATHING: A LITTLE
WALKING IN HOSPITAL ROOM: A LITTLE
TOILETING: A LITTLE
MOBILITY SCORE: 19
CLIMB 3 TO 5 STEPS WITH RAILING: A LITTLE
DAILY ACTIVITIY SCORE: 19
DRESSING REGULAR LOWER BODY CLOTHING: A LITTLE
DRESSING REGULAR UPPER BODY CLOTHING: A LITTLE
STANDING UP FROM CHAIR USING ARMS: A LITTLE
DRESSING REGULAR LOWER BODY CLOTHING: A LITTLE
DRESSING REGULAR UPPER BODY CLOTHING: A LITTLE
HELP NEEDED FOR BATHING: A LITTLE
PERSONAL GROOMING: A LITTLE
DAILY ACTIVITIY SCORE: 19
WALKING IN HOSPITAL ROOM: A LITTLE

## 2024-02-12 ASSESSMENT — PAIN SCALES - GENERAL
PAINLEVEL_OUTOF10: 3
PAINLEVEL_OUTOF10: 4
PAINLEVEL_OUTOF10: 0 - NO PAIN
PAINLEVEL_OUTOF10: 0 - NO PAIN
PAINLEVEL_OUTOF10: 7
PAINLEVEL_OUTOF10: 0 - NO PAIN
PAINLEVEL_OUTOF10: 7
PAINLEVEL_OUTOF10: 0 - NO PAIN

## 2024-02-12 ASSESSMENT — PAIN DESCRIPTION - LOCATION: LOCATION: BACK

## 2024-02-12 ASSESSMENT — ACTIVITIES OF DAILY LIVING (ADL): HOME_MANAGEMENT_TIME_ENTRY: 30

## 2024-02-12 ASSESSMENT — PAIN SCALES - WONG BAKER
WONGBAKER_NUMERICALRESPONSE: HURTS LITTLE BIT
WONGBAKER_NUMERICALRESPONSE: HURTS LITTLE BIT

## 2024-02-12 NOTE — PROGRESS NOTES
Patient is from home, prefers home upon discharge, is doing well with therapy, has medical issues with blood pressure and blood glucose levels. Will continue to follow for further discharge needs.

## 2024-02-12 NOTE — PROGRESS NOTES
[facility] Progress Note           Rounding Cardiologist:  Dr. Jose Murry/Artur Grider APRN-CNP  Primary Cardiologist: Dr. Ronald Wellington    Date:  2/12/2024  Patient:  Daphney Chen  YOB: 1960  MRN:  [id number]   Admit Date:  2/7/2024      SUBJECTIVE:    Daphney Chen was seen and examined today at bedside.   She denies any chest pain or shortness of breath.   Blood pressures have been better  Infectious disease on board, changed antibiotics for UTI      VITALS:     Vitals:    02/11/24 2010 02/11/24 2050 02/12/24 0408 02/12/24 0734   BP: 100/68  103/56 97/56   BP Location: Left arm   Left arm   Patient Position: Lying   Lying   Pulse: 87  79 80   Resp: 16  16 16   Temp: 36.2 °C (97.2 °F)  36.9 °C (98.4 °F) 36.1 °C (97 °F)   TempSrc: Temporal  Temporal Temporal   SpO2: 96% 95% 92% 95%   Weight:       Height:           Intake/Output Summary (Last 24 hours) at 2/12/2024 0923  Last data filed at 2/11/2024 2328  Gross per 24 hour   Intake 1249.9 ml   Output 500 ml   Net 749.9 ml         Wt Readings from Last 4 Encounters:   02/07/24 106 kg (233 lb 0.4 oz)   01/31/24 105 kg (232 lb)   01/30/24 105 kg (232 lb)   12/05/23 106 kg (234 lb)       CURRENT MEDICATIONS:   albuterol, 2 puff, inhalation, q4h  aspirin, 81 mg, oral, Daily  atorvastatin, 80 mg, oral, Nightly  cefepime, 1 g, intravenous, q8h  ergocalciferol, 50,000 Units, oral, Weekly  gabapentin, 400 mg, oral, TID  glipiZIDE, 5 mg, oral, Daily before breakfast  insulin regular, 0-10 Units, subcutaneous, TID with meals  levothyroxine, 137 mcg, oral, Daily  [Held by provider] losartan, 50 mg, oral, Daily  melatonin, 10 mg, oral, Nightly  metFORMIN, 1,000 mg, oral, BID with meals  [Held by provider] metoprolol succinate XL, 25 mg, oral, Daily  polyethylene glycol, 17 g, oral, Daily  potassium chloride CR, 20 mEq, oral, Daily  rivaroxaban, 2.5 mg, oral, BID  tiotropium, 2 Inhalation, inhalation, Daily  topiramate, 25 mg, oral, Nightly  [Held by  provider] torsemide, 40 mg, oral, Daily      oxygen, , Last Rate: 3 L/min (02/11/24 2050)  sodium chloride 0.9%, 75 mL/hr, Last Rate: 75 mL/hr (02/11/24 1143)      Current Outpatient Medications   Medication Instructions    albuterol 90 mcg/actuation inhaler 2 puffs, inhalation, Every 4 hours    aspirin 81 mg chewable tablet 1 tablet, oral, Daily    atorvastatin (LIPITOR) 80 mg, oral, Nightly    cefdinir (OMNICEF) 300 mg, oral, 2 times daily    ergocalciferol (VITAMIN D-2) 50,000 Units, oral, Weekly    gabapentin (NEURONTIN) 400 mg, oral, 3 times daily    levothyroxine (SYNTHROID, LEVOXYL) 137 mcg, oral, Daily    losartan (COZAAR) 50 mg, oral, Daily    melatonin 10 mg tablet 1 tablet, oral, Nightly    metFORMIN (GLUCOPHAGE) 1,000 mg, oral, 2 times daily with meals    metoprolol succinate XL (TOPROL-XL) 25 mg, oral, Daily    naloxone (NARCAN) 4 mg, nasal, As needed    nitroglycerin (NITROSTAT) 0.4 mg, sublingual, Every 5 min PRN    OneTouch Delica Plus Lancet 33 gauge misc Daily, AS DIRECTED    OneTouch Verio test strips strip Daily    oxyCODONE (ROXICODONE) 15 mg, oral, Every 6 hours PRN    potassium chloride CR 20 mEq ER tablet TAKE 1 TABLET(20 MEQ) BY MOUTH EVERY DAY. DO NOT CRUSH OR CHEW    rivaroxaban (XARELTO) 2.5 mg, oral, 2 times daily    Spiriva Respimat 2.5 mcg/actuation inhaler 2 puffs, inhalation, Daily    topiramate (Topamax) 25 mg tablet     torsemide (DEMADEX) 40 mg, oral, Daily        PHYSICAL EXAMINATION:   GENERAL:  Well developed, well nourished, in no acute distress.  CHEST:  Symmetric and nontender.  NEURO/PSYCH:  Alert and oriented times three with approppriate behavior and responses.  NECK:  Supple, no JVD, no bruit.  LUNGS: Scattered rhonchi, no wheezing or rales, diminished basal breath sounds  HEART:  Rate and rhythm regular with no evident murmur, no gallop appreciated.        There are no rubs, clicks or heaves.  EXTREMITIES:  Warm with good color, no clubbing or cyanosis.  There is trace  edema noted.  PERIPHERAL VASCULAR:  Pulses present and equally palpable; 2+ throughout.      LAB DATA:     CBC:   Results from last 7 days   Lab Units 02/11/24  0937   WBC AUTO x10*3/uL 5.2   RBC AUTO x10*6/uL 4.16   HEMOGLOBIN g/dL 11.5*   HEMATOCRIT % 38.1   PLATELETS AUTO x10*3/uL 186          CMP:    Results from last 7 days   Lab Units 02/09/24  1425   SODIUM mmol/L 137   POTASSIUM mmol/L 3.8   CHLORIDE mmol/L 102   CO2 mmol/L 29   BUN mg/dL 18   CREATININE mg/dL 0.86   GLUCOSE mg/dL 251*   CALCIUM mg/dL 8.4*         Cardiac Enzymes:    Lab Results   Component Value Date    TROPHS 17 (H) 02/07/2024    TROPHS 17 (H) 02/07/2024    TROPHS 245 (H) 06/25/2023           BNP:   Lab Results   Component Value Date    BNP 43 02/07/2024        HgBA1c:    Lab Results   Component Value Date    HGBA1C 7.8 (H) 11/30/2023       CBC:  Lab Results   Component Value Date    WBC 5.2 02/11/2024    RBC 4.16 02/11/2024    HGB 11.5 (L) 02/11/2024    HCT 38.1 02/11/2024    MCV 92 02/11/2024    MCH 27.6 02/11/2024    MCHC 30.2 (L) 02/11/2024    RDW 15.6 (H) 02/11/2024     02/11/2024       BMP:  Lab Results   Component Value Date     02/09/2024    K 3.8 02/09/2024     02/09/2024    CO2 29 02/09/2024    BUN 18 02/09/2024    CREATININE 0.86 02/09/2024         DIAGNOSTIC TESTING:     ECG 12 lead    Result Date: 2/7/2024  Normal sinus rhythm Left ventricular hypertrophy with repolarization abnormality Inferior infarct (cited on or before 07-FEB-2024) Abnormal ECG When compared with ECG of 07-FEB-2024 16:00, (unconfirmed) No significant change was found See ED provider note for full interpretation and clinical correlation Confirmed by Osiel Marshall ([id number]) on 2/7/2024 10:53:11 PM    CT angio chest for pulmonary embolism    Result Date: 2/7/2024  STUDY: CT Angiogram of the Chest; 02/07/2024 at 7:14 PM INDICATION: Shortness of breath, lightheadedness. Elevated D-dimer. Evaluate for pulmonary embolism. COMPARISON: XR chest  of same date, 02/07/24. CTA chest 02/01/23. CTA CAP 07/27/23. ACCESSION NUMBER(S): [id number] ORDERING CLINICIAN: RODRIGO PUTNAM TECHNIQUE:  CTA of the chest was performed with intravenous contrast. Images are reviewed and processed at a workstation according to the CT angiogram protocol with 3-D and/or MIP post processing imaging generated.  Omnipaque-350 75 mL was administered intravenously. Automated mA/kV exposure control was utilized and patient examination was performed in strict accordance with principles of ALARA. FINDINGS: There are no detectable pulmonary emboli. The pulmonary arteries are normal caliber. The heart is normal overall size with minimal LV dilatation. There are minimal coronary artery calcified lesions. There is no thoracic aortic aneurysm or dissection. There is minimal atherosclerotic thrombus in the mid descending thoracic aorta. Small lymph nodes are seen in the mediastinum. There is no bulky lymphadenopathy or focal mass. There is no pericardial effusion. The lungs demonstrate minimal to moderate upper lobe-predominant centrilobular emphysema, in addition to minimal bullous emphysema at the left lung base. There is minimal biapical scarring, with additional focal scarring or atelectasis in the lung bases. There are no pleural effusions. There is no pneumothorax. Previous extensive surgery is seen in the lower thoracic and lumbar spine, with spinal rods in place. Rib cage appears intact. Upper abdomen suggests subtle cirrhotic morphology in the liver.    1. No detectable pulmonary emboli. 2. COPD; no infiltrates or vascular congestion. 3. Remainder as above. Signed by Micheal Diop MD    XR chest 1 view    Result Date: 2/7/2024  STUDY: Chest Radiograph;  2/7/2024 at 4:59 PM. INDICATION: Shortness of breath. COMPARISON: CTA CAP 7/27/2023; XR chest 6/24/2023, 3/1/2023; CTA chest 2/1/2023. ACCESSION NUMBER(S): [id number] ORDERING CLINICIAN: RODRIGO PUTNAM TECHNIQUE:  Frontal chest was  obtained at 16:59 hours (two images). FINDINGS: CARDIOMEDIASTINAL SILHOUETTE: Cardiomediastinal silhouette is normal in size and configuration.  LUNGS: Lungs are clear.  ABDOMEN: No remarkable upper abdominal findings.  BONES: No acute osseous changes.    No acute pulmonary pathology. Signed by Tra Garcia MD       RADIOLOGY:     CT angio chest for pulmonary embolism   Final Result   1. No detectable pulmonary emboli.   2. COPD; no infiltrates or vascular congestion.   3. Remainder as above.   Signed by Micheal Diop MD      XR chest 1 view   Final Result   No acute pulmonary pathology.   Signed by Tra Garcia MD          PROBLEM LIST     Patient Active Problem List   Diagnosis    Complex regional pain syndrome type 1 of left upper extremity    PVD (peripheral vascular disease) (CMS/HCC)    Primary hypertension    Type 2 diabetes mellitus, with long-term current use of insulin (CMS/HCC)    Vaginal bleeding    Combined systolic and diastolic cardiac dysfunction    Impaired functional mobility, balance, gait, and endurance    Cystitis    Gastroesophageal reflux disease without esophagitis    Hypothyroidism    Major depressive disorder, single episode, moderate (CMS/HCC)    Degeneration of lumbar intervertebral disc    Disseminated intravascular coagulation (CMS/HCC)    Nicotine use disorder    Obesity, Class I, BMI 30-34.9    Osteoarthrosis    Other voice and resonance disorders    Postoperative pain after spinal surgery    Posttraumatic muscle contracture following injury (CMS/HCC)    Pseudarthrosis following spinal fusion    Contusion of lower back    Lumbar radiculopathy    Sprain of sacroiliac ligament    Spinal stenosis of lumbar region    Sprain, low back    Tobacco use disorder    Traumatic compartment syndrome of left upper extremity (CMS/HCC)    Chronic low back pain    Chronic pain of left upper extremity    Pain of left hand    Degeneration of cervical intervertebral disc    Abnormal computed  tomography angiography (CTA)    Ascending aortic aneurysm (CMS/HCC)    Asymptomatic stenosis of left carotid artery    Bilateral leg edema    CAD (coronary artery disease)    Class 2 obesity with body mass index (BMI) of 36.0 to 36.9 in adult    Hypertension associated with type 2 diabetes mellitus (CMS/HCC)    Infrarenal abdominal aortic aneurysm (AAA) without rupture (CMS/HCC)    Leg pain    Mixed diabetic hyperlipidemia associated with type 2 diabetes mellitus (CMS/HCC)    Moderate generalized edema    PAD (peripheral artery disease) (CMS/HCC)    Subclavian artery stenosis, left (CMS/HCC)    Weight gain    Hypotension, unspecified hypotension type       ASSESSMENT:   Impression:  Hypotension persisted in the face of increased lactate and reported urinary tract infection, resolved  Normal, cardiac by most recent echo  No active cardiovascular symptoms  Hyperlipidemia  Type 2 diabetes on insulin  COPD  Obesity  Peripheral vascular disease  History of abdominal aortic aneurysm  PLAN:   Recommendation:  Infectious diseases has been consulted to rule out any more extensive infection that may be leading to hypotension and lactic acidosis, antibiotics changed  Continue gentle hydration, monitor strict I's and O's and daily weight  Monitor electrolytes, keep potassium greater than 4 magnesium greater than 2  Will plan to hold off on diuretics on discharge and Dr. Wellington can follow-up in office in regards to resuming  Would be okay resuming beta-blocker however may need to hold losartan until follow-up visit.  Resume at half dose.  Message sent to schedulers for follow-up with Dr. Wellington  Plan to sign off for the next day or so  Please reconsult for any further needs    Artur Grider [facility]  Adult Gerontology Acute Care Nurse Practitioner  [facility] [place]   [facility]  [phone]        Please do not hesitate to call with questions.  Electronically signed  by TIMUR Osborn-CNP, on 2/12/2024 at 9:23 AM   Patient seen and examined in conjunction with TIMUR Whitlock/CNP and agree with the evaluation as noted above.  Patient is doing well but she remains relatively hypotensive, receiving IV fluids.  Blood pressure medications are currently on hold including diuretics, currently getting treated for UTI.  Agree with resuming beta-blockers but at lower dose of Toprol-XL 12.5 mg daily and continue to monitor orthostatic blood pressure.  She may need low-dose midodrine if she continues to have symptomatic hypotension.  We will reevaluate the patient in the a.m.  AKA

## 2024-02-12 NOTE — CONSULTS
Inpatient consult to Endocrinology  Consult performed by: Ayaan Salas MD  Consult ordered by: MD Dagoberto Mehta MD    Assessment/Plan     Diabetes type 2 uncontrolled   Was given steroids   Was on oral diabetes agents - now added scale with Metformin     Plan -DM 2 Uncontrolled   Increasing HBA1C over years   Needs Diabetes education and diet modification and additional meds  since glucoses not at goal- will add Glipizide 5 mg q am -          Latest Reference Range & Units 04/28/21 13:05 05/09/22 12:32 05/16/22 11:05 02/02/23 06:15 06/09/23 09:14 06/15/23 12:27 11/30/23 11:55   Hemoglobin A1C see below % 6.2 (H) (E) 6.1 (H) (E) 6.0 (H) (E) 7.5 ! 6.8 ! 6.7 ! 7.8 (H)   (H): Data is abnormally high  !: Data is abnormal  (E): External lab result     Latest Reference Range & Units 02/02/23 19:28 02/02/23 23:49 02/03/23 06:16 02/03/23 11:02 02/03/23 16:07 02/03/23 20:04 02/04/23 05:59 02/04/23 11:03 06/13/23 05:50 06/13/23 20:31 06/14/23 07:11 06/14/23 11:04 06/14/23 15:02 06/14/23 20:40 06/14/23 23:14 06/15/23 04:30 06/15/23 07:13 06/15/23 11:00 06/15/23 15:03 06/16/23 03:34 06/16/23 07:09 06/16/23 07:11 06/16/23 11:18 06/16/23 15:15 06/17/23 07:15 06/17/23 11:13 06/17/23 15:17 06/18/23 03:20 06/18/23 09:14 06/18/23 12:33 06/18/23 19:40 06/19/23 07:55 06/19/23 12:29 06/19/23 16:24 06/19/23 20:37 06/20/23 07:17 06/20/23 11:18 06/20/23 15:41 06/20/23 19:48 06/20/23 23:43 06/21/23 03:16 06/21/23 07:33 06/21/23 12:01 06/21/23 16:19 06/21/23 19:50 06/22/23 00:52 06/22/23 04:35 06/22/23 08:34 06/22/23 11:59 06/22/23 16:20 06/22/23 19:44 06/23/23 00:07 06/23/23 04:23 06/23/23 07:51 06/23/23 11:26 06/23/23 16:35 06/23/23 21:13 06/25/23 11:07 06/25/23 16:11 06/25/23 22:38 06/26/23 08:51 06/26/23 11:21 06/26/23 16:06 06/26/23 20:45 06/26/23 21:46 06/27/23 08:26 06/27/23 11:13 06/27/23 16:15 06/27/23 20:19 06/28/23 06:18 06/28/23 11:10 06/28/23 16:25 06/28/23 20:46 06/29/23 00:05 06/29/23 06:24  06/29/23 07:23 06/29/23 11:13 06/29/23 15:55 06/29/23 15:57 06/29/23 17:59 06/29/23 21:09 06/30/23 10:47 06/30/23 16:59 02/07/24 18:17 02/07/24 23:03 02/08/24 07:02 02/08/24 11:15 02/08/24 16:07 02/08/24 19:32 02/09/24 07:19 02/09/24 10:59 02/09/24 16:01 02/09/24 22:26 02/10/24 06:36 02/10/24 11:06 02/10/24 16:26 02/10/24 20:50 02/11/24 06:52 02/11/24 11:02 02/11/24 15:50   POCT Glucose 74 - 99 mg/dL 288 (H) 204 (H) 161 (H) 217 (H) 255 (H) 188 (H) 277 (H) 175 (H) 139 (H) 128 (H) 223 (H) 256 (H) 217 (H) 179 (H) 164 (H) 160 (H) 187 (H) 231 (H) 203 (H) 139 (H) 165 (H) 175 (H) 188 (H) 166 (H) 148 (H) 139 (H) 147 (H) 139 (H) 161 (H) 153 (H) 196 (H) 202 (H) 157 (H) 247 (H) 254 (H) 287 (H) 210 (H) 284 (H) 272 (H) 252 (H) 181 (H) 219 (H) 352 (H) 331 (H) 331 (H) 223 (H) 170 (H) 324 (H) 297 (H) 193 (H) 226 (H) 250 (H) 235 (H) 242 (H) 313 (H) 237 (H) 190 (H) 229 (H) 273 (H) 297 (H) 227 (H) 257 (H) 308 (H) 480 (H) 369 (H) 222 (H) 194 (H) 362 (H) 307 (H) 137 (H) 269 (H) 358 (H) 438 (H) 319 (H) 173 (H) 150 (H) 268 (H) 482 (H) 431 (H) 381 (H) 354 (H) 251 (H) 302 (H) 310 (H) 309 (H) 465 (H) 474 (H) 386 (H) 348 (H) 333 (H) 280 (H) 244 (H) 264 (H) 273 (H) 260 (H) 291 (H) 221 (H) 242 (H) 270 (H) 265 (H)   (H): Data is abnormally high  Reason For Consult  Uncontrolled Diabetes     History Of Present Illness  Daphney Chen is a 64 y.o. female with  has no past medical history on file. presenting with uncontrolled diabetes - per HPI-.History Of Present Illness  Daphney Chen is a 64 y.o. female presenting with low blood pressure.     Patient was at her pain management physician office.  Noted to have low blood pressure.  Referred to the emergency department.  Emergency department states has been having weakness.  Lightheaded.  Short of breath.  Worse with exertion.  Emergency department she was noted to have urinary tract infection.  Also COPD exacerbation.  He was given IV antibiotics and IV steroids.  Admitted to the floor.   However she developed hyperglycemia.  I stopped the steroids.  I am giving her insulin.  She states normally her blood sugars are 130s at home.  Blood pressure has improved.  Pain is controlled.  Breathing is stable.  On examination she has a fine expiratory phase with occasional end expiratory wheeze.  She is alert she is oriented she is not in any distress she sitting up.  Trace edema.  Past medical family surgical social history reviewed.  Medications reconciled.  Objective findings reviewed.     64-year-old female presents emergency department with chief complaint of low blood pressure, shortness of breath, and lightheadedness. Patient reports that she had an appointment with her pain management doctor today and they found her blood pressure to be low and advised her to come here to the emergency department. Patient states that she also was found to have low blood pressure yesterday when she had a biopsy done on my breast lesion. She states she has been feeling lightheaded like she may pass out, but denies any recent falls or head injury. She also states she feels short of breath when exerting herself. No fevers, coughing, or congestion. Patient denies any significant chest pain. No abdominal pain, nausea, vomiting, dysuria, diarrhea, constipation, black or bloody stools. Patient does report worsening lower extremity edema recently. Chart review shows significant past medical history of complex regional pain syndrome, peripheral vascular disease, hypertension, diabetes, GERD, hypothyroidism, depression, previous tobacco use, elevated BMI, osteoarthritis, chronic back pain, CAD, and peripheral arterial disease. Denies any illicit drug use or regular alcohol use. Patient is on Plavix and denies any other anticoagulation.       Past Medical History  She has no past medical history on file.    Surgical History  She has a past surgical history that includes CT angio aorta and bilateral iliofemoral runoff w and or wo  IV contrast (2023); Carotid stent; Cardiac surgery; Tonsillectomy; Lumbar laminectomy; Total knee arthroplasty; Anterior compartment decompression; Breast lumpectomy; and Dilation and curettage of uterus.     Social History  She reports that she quit smoking about 4 years ago. Her smoking use included cigarettes. She has never used smokeless tobacco. She reports that she does not currently use alcohol. She reports that she does not use drugs.    Family History  Family History   Problem Relation Name Age of Onset    Diabetes Mother      Hypertension Mother      Heart attack Mother      Heart attack Daughter      Other (blood cancer) Daughter          Allergies  Morphine    Review of Systems  PER HPI    History reviewed. No pertinent past medical history.    Past Surgical History:   Procedure Laterality Date    ANTERIOR COMPARTMENT DECOMPRESSION      BREAST LUMPECTOMY      CARDIAC SURGERY      CAROTID STENT      CT AORTA AND BILATERAL ILIOFEMORAL RUNOFF ANGIOGRAM W AND/OR WO IV CONTRAST  2023    CT AORTA AND BILATERAL ILIOFEMORAL RUNOFF ANGIOGRAM W AND/OR WO IV CONTRAST 2023 [facility] CT    DILATION AND CURETTAGE OF UTERUS      LUMBAR LAMINECTOMY      TONSILLECTOMY      TOTAL KNEE ARTHROPLASTY         Social History     Socioeconomic History    Marital status:      Spouse name: Not on file    Number of children: Not on file    Years of education: Not on file    Highest education level: Not on file   Occupational History    Not on file   Tobacco Use    Smoking status: Former     Types: Cigarettes     Quit date:      Years since quittin.1    Smokeless tobacco: Never   Vaping Use    Vaping Use: Never used   Substance and Sexual Activity    Alcohol use: Not Currently    Drug use: Never    Sexual activity: Defer   Other Topics Concern    Not on file   Social History Narrative    Not on file     Social Determinants of Health     Financial Resource Strain: Not on file   Food Insecurity: Not on file  "  Transportation Needs: Not on file   Physical Activity: Not on file   Stress: Not on file   Social Connections: Not on file   Intimate Partner Violence: Not on file   Housing Stability: Not on file        Physical Exam  Deferred     ROS, PMH, FH/SH, surgical history and allergies have been reviewed.    Last Recorded Vitals  Blood pressure 100/68, pulse 87, temperature 36.2 °C (97.2 °F), temperature source Temporal, resp. rate 16, height 1.727 m (5' 8\"), weight 106 kg (233 lb 0.4 oz), SpO2 95 %.    Relevant Results  Results from last 7 days   Lab Units 02/11/24  1550 02/11/24  1102 02/11/24  0652 02/10/24  2050 02/10/24  1626 02/09/24  1601 02/09/24  1425 02/08/24  0702 02/08/24  0544 02/07/24  1817 02/07/24  1549   POCT GLUCOSE mg/dL 265* 270* 242* 221* 291*   < >  --    < >  --    < >  --    GLUCOSE mg/dL  --   --   --   --   --   --  251*  --  531*  --  485*    < > = values in this interval not displayed.     Lab Results   Component Value Date    HGBA1C 7.8 (H) 11/30/2023      No current facility-administered medications on file prior to encounter.     Current Outpatient Medications on File Prior to Encounter   Medication Sig Dispense Refill    albuterol 90 mcg/actuation inhaler Inhale 2 puffs every 4 hours.      aspirin 81 mg chewable tablet Chew 1 tablet (81 mg) once daily.      atorvastatin (Lipitor) 80 mg tablet Take 1 tablet (80 mg) by mouth once daily at bedtime.      ergocalciferol (Vitamin D-2) 1.25 MG (58244 UT) capsule Take 1 capsule (50,000 Units) by mouth 1 (one) time per week. 12 capsule 3    gabapentin (Neurontin) 400 mg capsule Take 1 capsule (400 mg) by mouth 3 times a day.      levothyroxine (Synthroid, Levoxyl) 137 mcg tablet Take 1 tablet (137 mcg) by mouth once daily. 90 tablet 1    losartan (Cozaar) 50 mg tablet Take 1 tablet (50 mg) by mouth once daily.      melatonin 10 mg tablet Take 1 tablet (10 mg) by mouth once daily at bedtime.      metFORMIN (Glucophage) 1,000 mg tablet Take 1 tablet " (1,000 mg) by mouth 2 times a day with meals. 180 tablet 3    metoprolol succinate XL (Toprol-XL) 25 mg 24 hr tablet Take 1 tablet (25 mg) by mouth once daily.      naloxone (Narcan) 4 mg/0.1 mL nasal spray Administer 1 spray (4 mg) into affected nostril(s) if needed for opioid reversal.      nitroglycerin (Nitrostat) 0.4 mg SL tablet Place 1 tablet (0.4 mg) under the tongue every 5 minutes if needed for chest pain.      OneTouch Delica Plus Lancet 33 gauge misc once daily. AS DIRECTED      OneTouch Verio test strips strip once daily.      oxyCODONE (Roxicodone) 15 mg immediate release tablet Take 1 tablet (15 mg) by mouth every 6 hours if needed.      potassium chloride CR 20 mEq ER tablet TAKE 1 TABLET(20 MEQ) BY MOUTH EVERY DAY. DO NOT CRUSH OR CHEW 90 tablet 3    rivaroxaban (Xarelto) 2.5 mg tablet Take 1 tablet (2.5 mg) by mouth 2 times a day.      Spiriva Respimat 2.5 mcg/actuation inhaler Inhale 2 puffs once daily.      topiramate (Topamax) 25 mg tablet       torsemide (Demadex) 20 mg tablet TAKE 2 TABLETS BY MOUTH EVERY  tablet 3         Bryan Salas MD FACE  Office phone - [phone]  Fax - [phone]  Address: [address] [facility] [address] [facility] [address]  Address: [address] [facility] [address]  2/11/2024  11:59 PM

## 2024-02-12 NOTE — CARE PLAN
The patient's goals for the shift include to sleep    The clinical goals for the shift include safety      Problem: Fall/Injury  Goal: Not fall by end of shift  Outcome: Progressing     Problem: Fall/Injury  Goal: Be free from injury by end of the shift  Outcome: Progressing     Problem: Pain  Goal: My pain/discomfort is manageable  Outcome: Progressing     Problem: Respiratory  Goal: Minimal/no exertional discomfort or dyspnea this shift  Outcome: Progressing     Problem: Respiratory  Goal: Minimize anxiety/maximize coping throughout shift  Outcome: Progressing

## 2024-02-12 NOTE — PROGRESS NOTES
Endocrinology Progress Note  Patient: Daphney VAZQUEZ [place]  Unit/Bed: 1003/1003-A  YOB: 1960  MRN: [id number]  Acct: [id number]   Admitting Diagnosis: Hypoxia [R09.02]  COPD exacerbation (CMS/HCC) [J44.1]  Hypotension, unspecified hypotension type [I95.9]  Urinary tract infection without hematuria, site unspecified [N39.0]  Date:  2/7/2024  Hospital Day: 5  Attending: Tommy Dietz MD       Complaint:  Chief Complaint   Patient presents with    Hypotension     Coming from pain management for hypotension and SOB          Assessment     Patient Active Problem List   Diagnosis    Complex regional pain syndrome type 1 of left upper extremity    PVD (peripheral vascular disease) (CMS/HCC)    Primary hypertension    Type 2 diabetes mellitus, with long-term current use of insulin (CMS/HCC)    Vaginal bleeding    Combined systolic and diastolic cardiac dysfunction    Impaired functional mobility, balance, gait, and endurance    Cystitis    Gastroesophageal reflux disease without esophagitis    Hypothyroidism    Major depressive disorder, single episode, moderate (CMS/HCC)    Degeneration of lumbar intervertebral disc    Disseminated intravascular coagulation (CMS/HCC)    Nicotine use disorder    Obesity, Class I, BMI 30-34.9    Osteoarthrosis    Other voice and resonance disorders    Postoperative pain after spinal surgery    Posttraumatic muscle contracture following injury (CMS/HCC)    Pseudarthrosis following spinal fusion    Contusion of lower back    Lumbar radiculopathy    Sprain of sacroiliac ligament    Spinal stenosis of lumbar region    Sprain, low back    Tobacco use disorder    Traumatic compartment syndrome of left upper extremity (CMS/HCC)    Chronic low back pain    Chronic pain of left upper extremity    Pain of left hand    Degeneration of cervical intervertebral disc    Abnormal computed tomography angiography (CTA)    Ascending aortic aneurysm (CMS/HCC)    Asymptomatic stenosis of  left carotid artery    Bilateral leg edema    CAD (coronary artery disease)    Class 2 obesity with body mass index (BMI) of 36.0 to 36.9 in adult    Hypertension associated with type 2 diabetes mellitus (CMS/HCC)    Infrarenal abdominal aortic aneurysm (AAA) without rupture (CMS/HCC)    Leg pain    Mixed diabetic hyperlipidemia associated with type 2 diabetes mellitus (CMS/HCC)    Moderate generalized edema    PAD (peripheral artery disease) (CMS/HCC)    Subclavian artery stenosis, left (CMS/HCC)    Weight gain    Hypotension, unspecified hypotension type          Plan:  Diabetes mellitus uncontrolled patient glucose have been in the high 200s and 300s with even higher few days ago  The patient has been on sliding scale lispro and metformin.  I have added glipizide 5 mg oral starting this morning see how she does on that may have to be increased over time    Send follow-up in the office in the next week or so after discharge     Hypothyroidism by history  Patient's last TSH was 19.24 on 11/30/2023 and this has to be rechecked and then dosage adjusted as needed do not have recent TSH on profile      SUBJECTIVE    Patient is doing fair glucoses and labs reviewed as below    Go to now       Wednesday 2300 - Today 1459           Timeline   24 hr 8 hr 1 hr 5 min  All   [facility] 10    02/07 02/08 0700 - 02/09 0659 02/09 0700 - 02/10 0659 02/10 0700 - 02/11 0659 02/11 0700 - 02/12 0659 02/12    8 hr:  23-07 07-15 15-23 23-07 07-15 15-23 23-07 07-15 15-23 23-07 07-15 15-23 23-07 07-15        Glucose (mg/dl)    Accucheck   Fasting Glucose   Random Glucose     0 [id number] 0 [id number]    Glucose    FS glucose                 FS glucose     Random glucose  531    251           Random glucose     Fasting glucose                 Fasting glucose     Blood glucose                 Blood glucose     POCT glucose  309 474 348  280 264 273 260 221 242 270 265 229   POCT glucose     Potassium    Potassium,  Blood  4.5    3.8           Potassium, Blood     Medication Dosing    GlipiZIDE ORAL (mg)              5   GlipiZIDE ORAL (mg)     insulin lispro SUBQ (Units)   5              insulin lispro SUBQ (Units)     insulin regular, human INJ (Units)   10 15  14 4  12 6  10 6  4  insulin regular, human INJ (Units)     MetFORMIN ORAL (mg)   1,000 1,000  1,000 1,000  1,000 1,000  1,000 1,000  1,000                VITALS      Vitals:    02/11/24 2010 02/11/24 2050 02/12/24 0408 02/12/24 0734   BP: 100/68  103/56 97/56   BP Location: Left arm   Left arm   Patient Position: Lying   Lying   Pulse: 87  79 80   Resp: 16  16 16   Temp: 36.2 °C (97.2 °F)  36.9 °C (98.4 °F) 36.1 °C (97 °F)   TempSrc: Temporal  Temporal Temporal   SpO2: 96% 95% 92% 95%   Weight:       Height:           Intake/Output Summary (Last 24 hours) at 2/12/2024 1059  Last data filed at 2/11/2024 2328  Gross per 24 hour   Intake 1249.9 ml   Output 500 ml   Net 749.9 ml      Wt Readings from Last 4 Encounters:   02/07/24 106 kg (233 lb 0.4 oz)   01/31/24 105 kg (232 lb)   01/30/24 105 kg (232 lb)   12/05/23 106 kg (234 lb)        Allergies:  Allergies   Allergen Reactions    Morphine Itching and Rash     Feels like bugs are crawling on her        PHYSICAL EXAM   Physical Exam  Deferred    LABS   Magnesium:  Results from last 7 days   Lab Units 02/07/24  1549   MAGNESIUM mg/dL 1.67     Lipid Panel:       Lab Review  Lab Results   Component Value Date    BILITOT 0.6 02/08/2024    CALCIUM 8.4 (L) 02/09/2024    CO2 29 02/09/2024     02/09/2024    CREATININE 0.86 02/09/2024    GLUCOSE 251 (H) 02/09/2024    ALKPHOS 130 02/08/2024    K 3.8 02/09/2024    PROT 6.6 02/08/2024     02/09/2024    AST 43 (H) 02/08/2024    ALT 38 02/08/2024    BUN 18 02/09/2024    ANIONGAP 10 02/09/2024    MG 1.67 02/07/2024    PHOS 3.1 06/28/2023    ALBUMIN 3.3 (L) 02/08/2024    GFRF CANCELED 07/01/2023    GFRMALE CANCELED 07/01/2023     Lab Results   Component Value Date    TRIG  85 11/30/2023    CHOL 116 11/30/2023    LDLCALC 40 11/30/2023    HDL 58.6 11/30/2023     Lab Results   Component Value Date    HGBA1C 7.8 (H) 11/30/2023    HGBA1C 6.7 (A) 06/15/2023    HGBA1C 6.8 (A) 06/09/2023     The ASCVD Risk score (Sachin MONROY, et al., 2019) failed to calculate for the following reasons:    The patient has a prior MI or stroke diagnosis   Lab Results   Component Value Date    HGBA1C 7.8 (H) 11/30/2023    HGBA1C 6.7 (A) 06/15/2023    HGBA1C 6.8 (A) 06/09/2023     02/09/2024    K 3.8 02/09/2024     02/09/2024    CO2 29 02/09/2024    BUN 18 02/09/2024    CREATININE 0.86 02/09/2024    CALCIUM 8.4 (L) 02/09/2024    ALBUMIN 3.3 (L) 02/08/2024    PROT 6.6 02/08/2024    BILITOT 0.6 02/08/2024    ALKPHOS 130 02/08/2024    ALT 38 02/08/2024    AST 43 (H) 02/08/2024    GLUCOSE 251 (H) 02/09/2024    CHOL 116 11/30/2023    TRIG 85 11/30/2023    HDL 58.6 11/30/2023        albuterol, 2 puff, inhalation, q4h  aspirin, 81 mg, oral, Daily  atorvastatin, 80 mg, oral, Nightly  cefepime, 1 g, intravenous, q8h  ergocalciferol, 50,000 Units, oral, Weekly  gabapentin, 400 mg, oral, TID  glipiZIDE, 5 mg, oral, Daily before breakfast  insulin regular, 0-10 Units, subcutaneous, TID with meals  levothyroxine, 137 mcg, oral, Daily  [Held by provider] losartan, 50 mg, oral, Daily  melatonin, 10 mg, oral, Nightly  metFORMIN, 1,000 mg, oral, BID with meals  [Held by provider] metoprolol succinate XL, 25 mg, oral, Daily  polyethylene glycol, 17 g, oral, Daily  potassium chloride CR, 20 mEq, oral, Daily  rivaroxaban, 2.5 mg, oral, BID  tiotropium, 2 Inhalation, inhalation, Daily  topiramate, 25 mg, oral, Nightly  [Held by provider] torsemide, 40 mg, oral, Daily             Electronically signed by Ayaan Salas MD on 2/12/2024 at 10:59 AM

## 2024-02-12 NOTE — PROGRESS NOTES
Physical Therapy    Physical Therapy Treatment    Patient Name: Daphney Chen  MRN: [id number]  Today's Date: 2/12/2024  Time Calculation  Start Time: 1415  Stop Time: 1436  Time Calculation (min): 21 min       Assessment/Plan   End of Session Communication: Bedside nurse  Assessment Comment: Pt presents with fair effort throughout todays session. Willing to participate; however, due to feeling dizzy and unstable limiting progress this date. Call light and all needs in reach.  End of Session Patient Position: Bed, 2 rail up, Alarm off, not on at start of session          General Visit Information:   PT  Visit  PT Received On: 02/12/24  General  Prior to Session Communication: Bedside nurse  Patient Position Received: Bed, 2 rail up, Alarm off, not on at start of session  General Comment: Pt presents seated EOB with RN assessing vitals, agreeable to therapy. Pt states she has been fatigued and felt unstable all day and does not want to do any walking due to fear of falling. States she has been too dizzy while moving.  Subjective     Precautions:  Precautions  Medical Precautions: Fall precautions  Precautions Comment:  (Falls, O2, medical)       Objective     Pain:  Pain Assessment  Pain Assessment: 0-10  Pain Score: 3         Treatments:  Therapeutic Exercise  Therapeutic Exercise Performed: Yes  Therapeutic Exercise Activity 1: seated, BLE ankle pumps, LAQ, marches, hip ABD/ADD 15x VC/demo for proper performance.        Bed Mobility  Bed Mobility: Yes  Bed Mobility 2  Bed Mobility  2: Sitting to supine  Level of Assistance 2: Independent  Bed Mobility Comments 2: Pt performed EOB<>supine IND, able to lift LEs onto bed and utilize UEs to lower trunk. Demonstrates ability to scoot to center/HOB.  Ambulation/Gait Training  Ambulation/Gait Training Performed: No  Transfers  Transfer: Yes  Transfers 2  Transfer From 2: Bed to  Transfer to 2: Stand  Technique 2: Stand to sit, Sit to stand  Transfer Device 2: Quad  cane  Transfer Level of Assistance 2: Close supervision  Trials/Comments 2: Pt performed STS from EOB<>QC SUP. No VC required for sequencing. Good eccentric control. Pt performed 3x for LE strengthening and endurance. Due to instability and dizzy sensation only able to tolerate ~45sec standing this date.          Outcome Measures:  [facility] Basic Mobility  Turning from your back to your side while in a flat bed without using bedrails: None  Moving from lying on your back to sitting on the side of a flat bed without using bedrails: None  Moving to and from bed to chair (including a wheelchair): A little  Standing up from a chair using your arms (e.g. wheelchair or bedside chair): A little  To walk in hospital room: A little  Climbing 3-5 steps with railing: A lot  Basic Mobility - Total Score: 19  Education Documentation  Mobility Training, taught by Jessica Abdul PTA at 2/12/2024  3:07 PM.  Learner: Patient  Readiness: Acceptance  Method: Explanation, Demonstration  Response: Verbalizes Understanding, Needs Reinforcement    Education Comments  No comments found.        EDUCATION:  Outpatient Education  Individual(s) Educated: Patient  Education Provided: Body Mechanics, Posture (educated on PT role in care and importance of OOB activity)  Patient Response to Education: Patient/Caregiver Verbalized Understanding of Information  Encounter Problems       Encounter Problems (Active)       PT Problem       Patient to ambulate 20' with QC modified independent  (Not Progressing)       Start:  02/08/24    Expected End:  02/22/24            Transfers sit <> stand independent  (Progressing)       Start:  02/08/24    Expected End:  02/22/24            Patient to ambulate with platform ww LUE 20' modified independent  (Not Progressing)       Start:  02/08/24    Expected End:  02/22/24

## 2024-02-12 NOTE — PROGRESS NOTES
Occupational Therapy    OT Treatment    Patient Name: Daphney Chen  MRN: [id number]  Today's Date: 2/12/2024  Time Calculation  Start Time: 0907  Stop Time: 0937  Time Calculation (min): 30 min         Assessment:  End of Session Communication: Bedside nurse  End of Session Patient Position: Up in chair, Alarm on       Subjective   Previous Visit Info:  OT Last Visit  OT Received On: 02/12/24  General:  General  Prior to Session Communication: Bedside nurse  Patient Position Received: Bed, 3 rail up  General Comment: pt agreeable to OT tx, pt stating she is feeling a little better.  Precautions:  Medical Precautions: Fall precautions  Vital Signs:  Vital Signs  BP:  (110/62)  Pain:  Pain Assessment  Pain Assessment: 0-10  Pain Score: 0 - No pain    Objective    Cognition:  Cognition  Overall Cognitive Status: Within Functional Limits       Activities of Daily Living: LE Dressing  LE Dressing:  (pt seated EOB threading pants over BLE with increased time and required SBA. pt standing with FWW performing clothing mgmt over B hips with SBA for safety. pt educated on alternating hand placement on FWW for steadying.)  Functional Standing Tolerance:  Functional Standing Tolerance Comments: pt demos F + supported during LB dressing tasks.  Bed Mobility/Transfers: Transfers  Transfer:  (pt required sBA for functional transfers with use of FWW. pt educated on safe hand placement when performing sit  < > stand transfers.)      Outcome Measures:[facility] Daily Activity  Putting on and taking off regular lower body clothing: A little  Bathing (including washing, rinsing, drying): A little  Putting on and taking off regular upper body clothing: A little  Toileting, which includes using toilet, bedpan or urinal: A little  Taking care of personal grooming such as brushing teeth: A little  Eating Meals: None  Daily Activity - Total Score: 19        Education Documentation  ADL Training, taught by CAMI Mcdonald at 2/12/2024  12:38 PM.  Learner: Patient  Readiness: Acceptance  Method: Explanation  Response: Needs Reinforcement  Comment: pt educated on OT POC and DME for home setting such and tub grab bar    Education Comments  No comments found.        OP EDUCATION:       Goals:  Encounter Problems       Encounter Problems (Active)       OT Goals       Pt will dress LB with SBA (Progressing)       Start:  02/08/24    Expected End:  02/22/24            Pt will demo fair + dyn std balance with ADLS (Progressing)       Start:  02/08/24    Expected End:  02/22/24            Pt will verbalize 3 energy conservation strategies to increase indep with aDLS (Progressing)       Start:  02/08/24    Expected End:  02/22/24            Pt will transfer to bed, chair, toilet with modified indep (Progressing)       Start:  02/08/24    Expected End:  02/22/24

## 2024-02-12 NOTE — PROGRESS NOTES
"Daphney Chen is a 64 y.o. female on day 5 of admission presenting with Hypotension, unspecified hypotension type.    Subjective   Hospital follow-up.  Patient seen at the bedside.  She is actually sitting with the bedside chair.  Was they are therapy.  Spoke with nursing at bedside.  Patient complaining of pain.  Chronic pain.  She is on oxycodone.  However patient remains hypotensive.  Under 100.  For now we will hold her pain medications.  Will continue IV fluids.  Get her up moving see how she does.  He is being followed by subspecialty consultations.  Being followed by cardiology.  Continue current care and vascular care.  Was seen by endocrinology.  Diabetes medications adjusted.  Ongoing infectious disease follow-up.  She is on IV antibiotics.  Urinary tract infection.  She was hypotensive.  She has received fluid boluses.  Will pressure improved.  For now we will hold her pain medication due to her hypotension.  Will see how her blood pressure does.  Get her up to remove in.  Will get a wean her oxygen.       Objective     Physical Exam  Sitting up.  She is alert.  Nasal cannula oxygen in place.  Improved air entry.  Improved air movement.  No crackles.  JVD.  Distant heart sounds.  Last Recorded Vitals  Blood pressure 97/56, pulse 80, temperature 36.1 °C (97 °F), temperature source Temporal, resp. rate 16, height 1.727 m (5' 8\"), weight 106 kg (233 lb 0.4 oz), SpO2 95 %.  Intake/Output last 3 Shifts:  I/O last 3 completed shifts:  In: 2314.9 (21.9 mL/kg) [I.V.:1065 (10.1 mL/kg); IV Piggyback:1249.9]  Out: 500 (4.7 mL/kg) [Urine:500 (0.1 mL/kg/hr)]  Weight: 105.7 kg     Relevant Results  Recent Results (from the past 72 hour(s))   POCT GLUCOSE    Collection Time: 02/09/24 10:59 AM   Result Value Ref Range    POCT Glucose 280 (H) 74 - 99 mg/dL   Basic Metabolic Panel    Collection Time: 02/09/24  2:25 PM   Result Value Ref Range    Glucose 251 (H) 74 - 99 mg/dL    Sodium 137 136 - 145 mmol/L    Potassium " 3.8 3.5 - 5.3 mmol/L    Chloride 102 98 - 107 mmol/L    Bicarbonate 29 21 - 32 mmol/L    Anion Gap 10 10 - 20 mmol/L    Urea Nitrogen 18 6 - 23 mg/dL    Creatinine 0.86 0.50 - 1.05 mg/dL    eGFR 75 >60 mL/min/1.73m*2    Calcium 8.4 (L) 8.6 - 10.3 mg/dL   Lactate    Collection Time: 02/09/24  2:25 PM   Result Value Ref Range    Lactate 3.2 (H) 0.4 - 2.0 mmol/L   SST TOP    Collection Time: 02/09/24  2:25 PM   Result Value Ref Range    Extra Tube Hold for add-ons.    POCT GLUCOSE    Collection Time: 02/09/24  4:01 PM   Result Value Ref Range    POCT Glucose 244 (H) 74 - 99 mg/dL   Lactate    Collection Time: 02/09/24  8:01 PM   Result Value Ref Range    Lactate 2.2 (H) 0.4 - 2.0 mmol/L   POCT GLUCOSE    Collection Time: 02/09/24 10:26 PM   Result Value Ref Range    POCT Glucose 264 (H) 74 - 99 mg/dL   POCT GLUCOSE    Collection Time: 02/10/24  6:36 AM   Result Value Ref Range    POCT Glucose 273 (H) 74 - 99 mg/dL   POCT GLUCOSE    Collection Time: 02/10/24 11:06 AM   Result Value Ref Range    POCT Glucose 260 (H) 74 - 99 mg/dL   POCT GLUCOSE    Collection Time: 02/10/24  4:26 PM   Result Value Ref Range    POCT Glucose 291 (H) 74 - 99 mg/dL   Urinalysis with Reflex Microscopic    Collection Time: 02/10/24  5:59 PM   Result Value Ref Range    Color, Urine Straw Straw, Yellow    Appearance, Urine Clear Clear    Specific Gravity, Urine 1.009 1.005 - 1.035    pH, Urine 7.0 5.0, 5.5, 6.0, 6.5, 7.0, 7.5, 8.0    Protein, Urine NEGATIVE NEGATIVE mg/dL    Glucose, Urine >=500 (3+) (A) NEGATIVE mg/dL    Blood, Urine NEGATIVE NEGATIVE    Ketones, Urine NEGATIVE NEGATIVE mg/dL    Bilirubin, Urine NEGATIVE NEGATIVE    Urobilinogen, Urine 2.0 (N) <2.0 mg/dL    Nitrite, Urine NEGATIVE NEGATIVE    Leukocyte Esterase, Urine NEGATIVE NEGATIVE   POCT GLUCOSE    Collection Time: 02/10/24  8:50 PM   Result Value Ref Range    POCT Glucose 221 (H) 74 - 99 mg/dL   POCT GLUCOSE    Collection Time: 02/11/24  6:52 AM   Result Value Ref Range     POCT Glucose 242 (H) 74 - 99 mg/dL   C-Reactive Protein, High Sensitivity    Collection Time: 02/11/24  9:37 AM   Result Value Ref Range    CRP, High Sensitivity 3.2 (H) <1.0 mg/L   CBC    Collection Time: 02/11/24  9:37 AM   Result Value Ref Range    WBC 5.2 4.4 - 11.3 x10*3/uL    nRBC 0.0 0.0 - 0.0 /100 WBCs    RBC 4.16 4.00 - 5.20 x10*6/uL    Hemoglobin 11.5 (L) 12.0 - 16.0 g/dL    Hematocrit 38.1 36.0 - 46.0 %    MCV 92 80 - 100 fL    MCH 27.6 26.0 - 34.0 pg    MCHC 30.2 (L) 32.0 - 36.0 g/dL    RDW 15.6 (H) 11.5 - 14.5 %    Platelets 186 150 - 450 x10*3/uL   SST TOP    Collection Time: 02/11/24  9:37 AM   Result Value Ref Range    Extra Tube Hold for add-ons.    POCT GLUCOSE    Collection Time: 02/11/24 11:02 AM   Result Value Ref Range    POCT Glucose 270 (H) 74 - 99 mg/dL   POCT GLUCOSE    Collection Time: 02/11/24  3:50 PM   Result Value Ref Range    POCT Glucose 265 (H) 74 - 99 mg/dL   POCT GLUCOSE    Collection Time: 02/12/24 12:30 AM   Result Value Ref Range    POCT Glucose 304 (H) 74 - 99 mg/dL   POCT GLUCOSE    Collection Time: 02/12/24  6:56 AM   Result Value Ref Range    POCT Glucose 229 (H) 74 - 99 mg/dL                          albuterol, 2 puff, inhalation, q4h  aspirin, 81 mg, oral, Daily  atorvastatin, 80 mg, oral, Nightly  cefepime, 1 g, intravenous, q8h  ergocalciferol, 50,000 Units, oral, Weekly  gabapentin, 400 mg, oral, TID  glipiZIDE, 5 mg, oral, Daily before breakfast  insulin regular, 0-10 Units, subcutaneous, TID with meals  levothyroxine, 137 mcg, oral, Daily  [Held by provider] losartan, 50 mg, oral, Daily  melatonin, 10 mg, oral, Nightly  metFORMIN, 1,000 mg, oral, BID with meals  [Held by provider] metoprolol succinate XL, 25 mg, oral, Daily  polyethylene glycol, 17 g, oral, Daily  potassium chloride CR, 20 mEq, oral, Daily  rivaroxaban, 2.5 mg, oral, BID  tiotropium, 2 Inhalation, inhalation, Daily  topiramate, 25 mg, oral, Nightly  [Held by provider] torsemide, 40 mg, oral,  Daily        ECG 12 lead    Result Date: 2/7/2024  Normal sinus rhythm Left ventricular hypertrophy with repolarization abnormality Inferior infarct (cited on or before 07-FEB-2024) Abnormal ECG When compared with ECG of 07-FEB-2024 16:00, (unconfirmed) No significant change was found See ED provider note for full interpretation and clinical correlation Confirmed by Osiel Marshall ([id number]) on 2/7/2024 10:53:11 PM    CT angio chest for pulmonary embolism    Result Date: 2/7/2024  STUDY: CT Angiogram of the Chest; 02/07/2024 at 7:14 PM INDICATION: Shortness of breath, lightheadedness. Elevated D-dimer. Evaluate for pulmonary embolism. COMPARISON: XR chest of same date, 02/07/24. CTA chest 02/01/23. CTA CAP 07/27/23. ACCESSION NUMBER(S): [id number] ORDERING CLINICIAN: RODRIGO PUTNAM TECHNIQUE:  CTA of the chest was performed with intravenous contrast. Images are reviewed and processed at a workstation according to the CT angiogram protocol with 3-D and/or MIP post processing imaging generated.  Omnipaque-350 75 mL was administered intravenously. Automated mA/kV exposure control was utilized and patient examination was performed in strict accordance with principles of ALARA. FINDINGS: There are no detectable pulmonary emboli. The pulmonary arteries are normal caliber. The heart is normal overall size with minimal LV dilatation. There are minimal coronary artery calcified lesions. There is no thoracic aortic aneurysm or dissection. There is minimal atherosclerotic thrombus in the mid descending thoracic aorta. Small lymph nodes are seen in the mediastinum. There is no bulky lymphadenopathy or focal mass. There is no pericardial effusion. The lungs demonstrate minimal to moderate upper lobe-predominant centrilobular emphysema, in addition to minimal bullous emphysema at the left lung base. There is minimal biapical scarring, with additional focal scarring or atelectasis in the lung bases. There are no pleural effusions.  There is no pneumothorax. Previous extensive surgery is seen in the lower thoracic and lumbar spine, with spinal rods in place. Rib cage appears intact. Upper abdomen suggests subtle cirrhotic morphology in the liver.    1. No detectable pulmonary emboli. 2. COPD; no infiltrates or vascular congestion. 3. Remainder as above. Signed by Micheal Diop MD    XR chest 1 view    Result Date: 2/7/2024  STUDY: Chest Radiograph;  2/7/2024 at 4:59 PM. INDICATION: Shortness of breath. COMPARISON: CTA CAP 7/27/2023; XR chest 6/24/2023, 3/1/2023; CTA chest 2/1/2023. ACCESSION NUMBER(S): [id number] ORDERING CLINICIAN: RODRIGO PUTNAM TECHNIQUE:  Frontal chest was obtained at 16:59 hours (two images). FINDINGS: CARDIOMEDIASTINAL SILHOUETTE: Cardiomediastinal silhouette is normal in size and configuration.  LUNGS: Lungs are clear.  ABDOMEN: No remarkable upper abdominal findings.  BONES: No acute osseous changes.    No acute pulmonary pathology. Signed by Tra Garcia MD       Assessment/Plan   Principal Problem:    Hypotension, unspecified hypotension type  Active Problems:    PVD (peripheral vascular disease) (CMS/HCC)    Primary hypertension    Combined systolic and diastolic cardiac dysfunction    Gastroesophageal reflux disease without esophagitis    Hypothyroidism    Major depressive disorder, single episode, moderate (CMS/HCC)    Osteoarthrosis    CAD (coronary artery disease)    Hypertension associated with type 2 diabetes mellitus (CMS/HCC)    PAD (peripheral artery disease) (CMS/HCC)    Subclavian artery stenosis, left (CMS/HCC)            I spent   minutes in the professional and overall care of this patient.      Tommy Dietz MD

## 2024-02-13 VITALS
DIASTOLIC BLOOD PRESSURE: 80 MMHG | HEIGHT: 68 IN | TEMPERATURE: 97.5 F | BODY MASS INDEX: 34.92 KG/M2 | RESPIRATION RATE: 16 BRPM | WEIGHT: 230.38 LBS | SYSTOLIC BLOOD PRESSURE: 122 MMHG | OXYGEN SATURATION: 90 % | HEART RATE: 81 BPM

## 2024-02-13 LAB
ANION GAP SERPL CALC-SCNC: 11 MMOL/L (ref 10–20)
BUN SERPL-MCNC: 11 MG/DL (ref 6–23)
CALCIUM SERPL-MCNC: 8.7 MG/DL (ref 8.6–10.3)
CHLORIDE SERPL-SCNC: 105 MMOL/L (ref 98–107)
CO2 SERPL-SCNC: 28 MMOL/L (ref 21–32)
CREAT SERPL-MCNC: 0.68 MG/DL (ref 0.5–1.05)
EGFRCR SERPLBLD CKD-EPI 2021: >90 ML/MIN/1.73M*2
GLUCOSE BLD MANUAL STRIP-MCNC: 165 MG/DL (ref 74–99)
GLUCOSE BLD MANUAL STRIP-MCNC: 199 MG/DL (ref 74–99)
GLUCOSE BLD MANUAL STRIP-MCNC: 208 MG/DL (ref 74–99)
GLUCOSE SERPL-MCNC: 182 MG/DL (ref 74–99)
HOLD SPECIMEN: NORMAL
HOLD SPECIMEN: NORMAL
POTASSIUM SERPL-SCNC: 4.2 MMOL/L (ref 3.5–5.3)
SODIUM SERPL-SCNC: 140 MMOL/L (ref 136–145)

## 2024-02-13 PROCEDURE — 2500000001 HC RX 250 WO HCPCS SELF ADMINISTERED DRUGS (ALT 637 FOR MEDICARE OP): Performed by: NURSE PRACTITIONER

## 2024-02-13 PROCEDURE — 2500000001 HC RX 250 WO HCPCS SELF ADMINISTERED DRUGS (ALT 637 FOR MEDICARE OP): Performed by: INTERNAL MEDICINE

## 2024-02-13 PROCEDURE — 2500000004 HC RX 250 GENERAL PHARMACY W/ HCPCS (ALT 636 FOR OP/ED): Performed by: INTERNAL MEDICINE

## 2024-02-13 PROCEDURE — 99233 SBSQ HOSP IP/OBS HIGH 50: CPT | Performed by: INTERNAL MEDICINE

## 2024-02-13 PROCEDURE — 36415 COLL VENOUS BLD VENIPUNCTURE: CPT | Performed by: INTERNAL MEDICINE

## 2024-02-13 PROCEDURE — 97535 SELF CARE MNGMENT TRAINING: CPT | Mod: GO,CO

## 2024-02-13 PROCEDURE — 82947 ASSAY GLUCOSE BLOOD QUANT: CPT

## 2024-02-13 PROCEDURE — 99238 HOSP IP/OBS DSCHRG MGMT 30/<: CPT | Performed by: FAMILY MEDICINE

## 2024-02-13 PROCEDURE — 2500000001 HC RX 250 WO HCPCS SELF ADMINISTERED DRUGS (ALT 637 FOR MEDICARE OP): Performed by: FAMILY MEDICINE

## 2024-02-13 PROCEDURE — 2500000002 HC RX 250 W HCPCS SELF ADMINISTERED DRUGS (ALT 637 FOR MEDICARE OP, ALT 636 FOR OP/ED): Performed by: FAMILY MEDICINE

## 2024-02-13 PROCEDURE — 80048 BASIC METABOLIC PNL TOTAL CA: CPT | Performed by: INTERNAL MEDICINE

## 2024-02-13 RX ORDER — GLIPIZIDE 5 MG/1
5 TABLET ORAL
Qty: 30 TABLET | Refills: 1 | Status: SHIPPED | OUTPATIENT
Start: 2024-02-14 | End: 2024-04-25

## 2024-02-13 RX ORDER — MIDODRINE HYDROCHLORIDE 2.5 MG/1
2.5 TABLET ORAL
Qty: 60 TABLET | Refills: 1 | Status: SHIPPED | OUTPATIENT
Start: 2024-02-13

## 2024-02-13 RX ORDER — MIDODRINE HYDROCHLORIDE 5 MG/1
2.5 TABLET ORAL
Status: DISCONTINUED | OUTPATIENT
Start: 2024-02-13 | End: 2024-02-13 | Stop reason: HOSPADM

## 2024-02-13 RX ADMIN — CEFEPIME 1 G: 1 INJECTION, POWDER, FOR SOLUTION INTRAMUSCULAR; INTRAVENOUS at 09:24

## 2024-02-13 RX ADMIN — INSULIN HUMAN 2 UNITS: 100 INJECTION, SOLUTION PARENTERAL at 09:57

## 2024-02-13 RX ADMIN — RIVAROXABAN 2.5 MG: 2.5 TABLET, FILM COATED ORAL at 09:24

## 2024-02-13 RX ADMIN — METFORMIN HYDROCHLORIDE 1000 MG: 500 TABLET, FILM COATED ORAL at 09:29

## 2024-02-13 RX ADMIN — OXYCODONE HYDROCHLORIDE 15 MG: 5 TABLET ORAL at 09:22

## 2024-02-13 RX ADMIN — ALBUTEROL SULFATE 2 PUFF: 90 AEROSOL, METERED RESPIRATORY (INHALATION) at 15:30

## 2024-02-13 RX ADMIN — OXYCODONE HYDROCHLORIDE 15 MG: 5 TABLET ORAL at 00:14

## 2024-02-13 RX ADMIN — MIDODRINE HYDROCHLORIDE 2.5 MG: 5 TABLET ORAL at 16:03

## 2024-02-13 RX ADMIN — CEFEPIME 1 G: 1 INJECTION, POWDER, FOR SOLUTION INTRAMUSCULAR; INTRAVENOUS at 00:14

## 2024-02-13 RX ADMIN — INSULIN HUMAN 4 UNITS: 100 INJECTION, SOLUTION PARENTERAL at 12:32

## 2024-02-13 RX ADMIN — CEFEPIME 1 G: 1 INJECTION, POWDER, FOR SOLUTION INTRAMUSCULAR; INTRAVENOUS at 16:04

## 2024-02-13 RX ADMIN — ALBUTEROL SULFATE 2 PUFF: 90 AEROSOL, METERED RESPIRATORY (INHALATION) at 12:35

## 2024-02-13 RX ADMIN — LEVOTHYROXINE SODIUM 137 MCG: 25 TABLET ORAL at 06:51

## 2024-02-13 RX ADMIN — METOPROLOL SUCCINATE 12.5 MG: 25 TABLET, EXTENDED RELEASE ORAL at 09:22

## 2024-02-13 RX ADMIN — METFORMIN HYDROCHLORIDE 1000 MG: 500 TABLET, FILM COATED ORAL at 16:03

## 2024-02-13 RX ADMIN — MIDODRINE HYDROCHLORIDE 2.5 MG: 5 TABLET ORAL at 09:29

## 2024-02-13 RX ADMIN — GABAPENTIN 400 MG: 400 CAPSULE ORAL at 16:03

## 2024-02-13 RX ADMIN — OXYCODONE HYDROCHLORIDE 15 MG: 5 TABLET ORAL at 16:25

## 2024-02-13 RX ADMIN — GABAPENTIN 400 MG: 400 CAPSULE ORAL at 09:23

## 2024-02-13 RX ADMIN — GLIPIZIDE 5 MG: 5 TABLET ORAL at 06:51

## 2024-02-13 RX ADMIN — ASPIRIN 81 MG: 81 TABLET, CHEWABLE ORAL at 09:22

## 2024-02-13 RX ADMIN — POTASSIUM CHLORIDE 20 MEQ: 1500 TABLET, EXTENDED RELEASE ORAL at 09:22

## 2024-02-13 RX ADMIN — ALBUTEROL SULFATE 2 PUFF: 90 AEROSOL, METERED RESPIRATORY (INHALATION) at 09:23

## 2024-02-13 RX ADMIN — ALBUTEROL SULFATE 2 PUFF: 90 AEROSOL, METERED RESPIRATORY (INHALATION) at 03:30

## 2024-02-13 RX ADMIN — TIOTROPIUM BROMIDE INHALATION SPRAY 2 PUFF: 3.12 SPRAY, METERED RESPIRATORY (INHALATION) at 09:23

## 2024-02-13 ASSESSMENT — COGNITIVE AND FUNCTIONAL STATUS - GENERAL
TOILETING: A LITTLE
DAILY ACTIVITIY SCORE: 19
DRESSING REGULAR UPPER BODY CLOTHING: A LITTLE
DRESSING REGULAR LOWER BODY CLOTHING: A LITTLE
WALKING IN HOSPITAL ROOM: A LITTLE
MOBILITY SCORE: 19
HELP NEEDED FOR BATHING: A LITTLE
MOBILITY SCORE: 19
STANDING UP FROM CHAIR USING ARMS: A LITTLE
WALKING IN HOSPITAL ROOM: A LITTLE
STANDING UP FROM CHAIR USING ARMS: A LITTLE
MOVING TO AND FROM BED TO CHAIR: A LITTLE
PERSONAL GROOMING: A LITTLE
CLIMB 3 TO 5 STEPS WITH RAILING: A LOT
CLIMB 3 TO 5 STEPS WITH RAILING: A LOT
MOVING TO AND FROM BED TO CHAIR: A LITTLE

## 2024-02-13 ASSESSMENT — PAIN SCALES - GENERAL
PAINLEVEL_OUTOF10: 8
PAINLEVEL_OUTOF10: 8
PAINLEVEL_OUTOF10: 3
PAINLEVEL_OUTOF10: 0 - NO PAIN
PAINLEVEL_OUTOF10: 0 - NO PAIN
PAINLEVEL_OUTOF10: 7

## 2024-02-13 ASSESSMENT — PAIN - FUNCTIONAL ASSESSMENT
PAIN_FUNCTIONAL_ASSESSMENT: 0-10

## 2024-02-13 ASSESSMENT — PAIN SCALES - WONG BAKER: WONGBAKER_NUMERICALRESPONSE: HURTS LITTLE BIT

## 2024-02-13 ASSESSMENT — ACTIVITIES OF DAILY LIVING (ADL): HOME_MANAGEMENT_TIME_ENTRY: 14

## 2024-02-13 NOTE — PROGRESS NOTES
"Daphney Chen is a 64 y.o. female on day 6 of admission presenting with Hypotension, unspecified hypotension type.    Subjective       Hospital follow-up.  Patient seen at bedside.  Spoke with nursing bedside.  She has been seen by cardiology.  Midodrine added.  Blood pressure has improved.  She is weaning off of oxygen.  Blood sugars improved.  Spoke with the patient.  She wants to go home.  Will evaluate for home O2.  She will go home on oral antibiotics.  Midodrine per cardiology.  Otherwise continue current medical therapy.  She is alert she is polite cooperative no distress scattered rhonchi anteriorly improved air entry distant heart sounds abdomen bowel sounds.    Objective     Physical Exam     Last Recorded Vitals  Blood pressure 114/73, pulse 84, temperature 36.2 °C (97.2 °F), resp. rate 16, height 1.727 m (5' 8\"), weight 104 kg (230 lb 6.1 oz), SpO2 96 %.  Intake/Output last 3 Shifts:  I/O last 3 completed shifts:  In: 610 (5.8 mL/kg) [P.O.:360; IV Piggyback:250]  Out: - (0 mL/kg)   Weight: 104.5 kg     Relevant Results  Recent Results (from the past 72 hour(s))   POCT GLUCOSE    Collection Time: 02/10/24  4:26 PM   Result Value Ref Range    POCT Glucose 291 (H) 74 - 99 mg/dL   Urinalysis with Reflex Microscopic    Collection Time: 02/10/24  5:59 PM   Result Value Ref Range    Color, Urine Straw Straw, Yellow    Appearance, Urine Clear Clear    Specific Gravity, Urine 1.009 1.005 - 1.035    pH, Urine 7.0 5.0, 5.5, 6.0, 6.5, 7.0, 7.5, 8.0    Protein, Urine NEGATIVE NEGATIVE mg/dL    Glucose, Urine >=500 (3+) (A) NEGATIVE mg/dL    Blood, Urine NEGATIVE NEGATIVE    Ketones, Urine NEGATIVE NEGATIVE mg/dL    Bilirubin, Urine NEGATIVE NEGATIVE    Urobilinogen, Urine 2.0 (N) <2.0 mg/dL    Nitrite, Urine NEGATIVE NEGATIVE    Leukocyte Esterase, Urine NEGATIVE NEGATIVE   POCT GLUCOSE    Collection Time: 02/10/24  8:50 PM   Result Value Ref Range    POCT Glucose 221 (H) 74 - 99 mg/dL   POCT GLUCOSE    Collection " Time: 02/11/24  6:52 AM   Result Value Ref Range    POCT Glucose 242 (H) 74 - 99 mg/dL   C-Reactive Protein, High Sensitivity    Collection Time: 02/11/24  9:37 AM   Result Value Ref Range    CRP, High Sensitivity 3.2 (H) <1.0 mg/L   CBC    Collection Time: 02/11/24  9:37 AM   Result Value Ref Range    WBC 5.2 4.4 - 11.3 x10*3/uL    nRBC 0.0 0.0 - 0.0 /100 WBCs    RBC 4.16 4.00 - 5.20 x10*6/uL    Hemoglobin 11.5 (L) 12.0 - 16.0 g/dL    Hematocrit 38.1 36.0 - 46.0 %    MCV 92 80 - 100 fL    MCH 27.6 26.0 - 34.0 pg    MCHC 30.2 (L) 32.0 - 36.0 g/dL    RDW 15.6 (H) 11.5 - 14.5 %    Platelets 186 150 - 450 x10*3/uL   SST TOP    Collection Time: 02/11/24  9:37 AM   Result Value Ref Range    Extra Tube Hold for add-ons.    POCT GLUCOSE    Collection Time: 02/11/24 11:02 AM   Result Value Ref Range    POCT Glucose 270 (H) 74 - 99 mg/dL   POCT GLUCOSE    Collection Time: 02/11/24  3:50 PM   Result Value Ref Range    POCT Glucose 265 (H) 74 - 99 mg/dL   POCT GLUCOSE    Collection Time: 02/12/24 12:30 AM   Result Value Ref Range    POCT Glucose 304 (H) 74 - 99 mg/dL   POCT GLUCOSE    Collection Time: 02/12/24  6:56 AM   Result Value Ref Range    POCT Glucose 229 (H) 74 - 99 mg/dL   POCT GLUCOSE    Collection Time: 02/12/24 11:03 AM   Result Value Ref Range    POCT Glucose 189 (H) 74 - 99 mg/dL   POCT GLUCOSE    Collection Time: 02/12/24  4:13 PM   Result Value Ref Range    POCT Glucose 207 (H) 74 - 99 mg/dL   Basic Metabolic Panel    Collection Time: 02/12/24  5:57 PM   Result Value Ref Range    Glucose 252 (H) 74 - 99 mg/dL    Sodium 135 (L) 136 - 145 mmol/L    Potassium 4.3 3.5 - 5.3 mmol/L    Chloride 103 98 - 107 mmol/L    Bicarbonate 27 21 - 32 mmol/L    Anion Gap 9 (L) 10 - 20 mmol/L    Urea Nitrogen 11 6 - 23 mg/dL    Creatinine 0.67 0.50 - 1.05 mg/dL    eGFR >90 >60 mL/min/1.73m*2    Calcium 8.9 8.6 - 10.3 mg/dL   Lavender Top    Collection Time: 02/12/24  6:01 PM   Result Value Ref Range    Extra Tube Hold for  add-ons.    SST TOP    Collection Time: 02/12/24  6:01 PM   Result Value Ref Range    Extra Tube Hold for add-ons.    POCT GLUCOSE    Collection Time: 02/12/24  7:36 PM   Result Value Ref Range    POCT Glucose 286 (H) 74 - 99 mg/dL   Basic Metabolic Panel    Collection Time: 02/13/24  5:45 AM   Result Value Ref Range    Glucose 182 (H) 74 - 99 mg/dL    Sodium 140 136 - 145 mmol/L    Potassium 4.2 3.5 - 5.3 mmol/L    Chloride 105 98 - 107 mmol/L    Bicarbonate 28 21 - 32 mmol/L    Anion Gap 11 10 - 20 mmol/L    Urea Nitrogen 11 6 - 23 mg/dL    Creatinine 0.68 0.50 - 1.05 mg/dL    eGFR >90 >60 mL/min/1.73m*2    Calcium 8.7 8.6 - 10.3 mg/dL   SST TOP    Collection Time: 02/13/24  5:45 AM   Result Value Ref Range    Extra Tube Hold for add-ons.    Lavender Top    Collection Time: 02/13/24  5:45 AM   Result Value Ref Range    Extra Tube Hold for add-ons.    POCT GLUCOSE    Collection Time: 02/13/24  6:50 AM   Result Value Ref Range    POCT Glucose 165 (H) 74 - 99 mg/dL   POCT GLUCOSE    Collection Time: 02/13/24 11:07 AM   Result Value Ref Range    POCT Glucose 208 (H) 74 - 99 mg/dL                          albuterol, 2 puff, inhalation, q4h  aspirin, 81 mg, oral, Daily  atorvastatin, 80 mg, oral, Nightly  cefepime, 1 g, intravenous, q8h  ergocalciferol, 50,000 Units, oral, Weekly  gabapentin, 400 mg, oral, TID  glipiZIDE, 5 mg, oral, Daily before breakfast  insulin regular, 0-10 Units, subcutaneous, TID with meals  levothyroxine, 137 mcg, oral, Daily  melatonin, 10 mg, oral, Nightly  metFORMIN, 1,000 mg, oral, BID with meals  metoprolol succinate XL, 12.5 mg, oral, Daily  midodrine, 2.5 mg, oral, BID with meals  polyethylene glycol, 17 g, oral, Daily  potassium chloride CR, 20 mEq, oral, Daily  rivaroxaban, 2.5 mg, oral, BID  tiotropium, 2 Inhalation, inhalation, Daily  topiramate, 25 mg, oral, Nightly        ECG 12 lead    Result Date: 2/7/2024  Normal sinus rhythm Left ventricular hypertrophy with repolarization  abnormality Inferior infarct (cited on or before 07-FEB-2024) Abnormal ECG When compared with ECG of 07-FEB-2024 16:00, (unconfirmed) No significant change was found See ED provider note for full interpretation and clinical correlation Confirmed by Osiel Marshall ([id number]) on 2/7/2024 10:53:11 PM    CT angio chest for pulmonary embolism    Result Date: 2/7/2024  STUDY: CT Angiogram of the Chest; 02/07/2024 at 7:14 PM INDICATION: Shortness of breath, lightheadedness. Elevated D-dimer. Evaluate for pulmonary embolism. COMPARISON: XR chest of same date, 02/07/24. CTA chest 02/01/23. CTA CAP 07/27/23. ACCESSION NUMBER(S): [id number] ORDERING CLINICIAN: RODRIGO PUTNAM TECHNIQUE:  CTA of the chest was performed with intravenous contrast. Images are reviewed and processed at a workstation according to the CT angiogram protocol with 3-D and/or MIP post processing imaging generated.  Omnipaque-350 75 mL was administered intravenously. Automated mA/kV exposure control was utilized and patient examination was performed in strict accordance with principles of ALARA. FINDINGS: There are no detectable pulmonary emboli. The pulmonary arteries are normal caliber. The heart is normal overall size with minimal LV dilatation. There are minimal coronary artery calcified lesions. There is no thoracic aortic aneurysm or dissection. There is minimal atherosclerotic thrombus in the mid descending thoracic aorta. Small lymph nodes are seen in the mediastinum. There is no bulky lymphadenopathy or focal mass. There is no pericardial effusion. The lungs demonstrate minimal to moderate upper lobe-predominant centrilobular emphysema, in addition to minimal bullous emphysema at the left lung base. There is minimal biapical scarring, with additional focal scarring or atelectasis in the lung bases. There are no pleural effusions. There is no pneumothorax. Previous extensive surgery is seen in the lower thoracic and lumbar spine, with spinal rods in  place. Rib cage appears intact. Upper abdomen suggests subtle cirrhotic morphology in the liver.    1. No detectable pulmonary emboli. 2. COPD; no infiltrates or vascular congestion. 3. Remainder as above. Signed by Micheal Diop MD    XR chest 1 view    Result Date: 2/7/2024  STUDY: Chest Radiograph;  2/7/2024 at 4:59 PM. INDICATION: Shortness of breath. COMPARISON: CTA CAP 7/27/2023; XR chest 6/24/2023, 3/1/2023; CTA chest 2/1/2023. ACCESSION NUMBER(S): [id number] ORDERING CLINICIAN: RODRIGO PUTNAM TECHNIQUE:  Frontal chest was obtained at 16:59 hours (two images). FINDINGS: CARDIOMEDIASTINAL SILHOUETTE: Cardiomediastinal silhouette is normal in size and configuration.  LUNGS: Lungs are clear.  ABDOMEN: No remarkable upper abdominal findings.  BONES: No acute osseous changes.    No acute pulmonary pathology. Signed by Tra Garcia MD       Assessment/Plan   Principal Problem:    Hypotension, unspecified hypotension type  Active Problems:    PVD (peripheral vascular disease) (CMS/HCC)    Primary hypertension    Combined systolic and diastolic cardiac dysfunction    Gastroesophageal reflux disease without esophagitis    Hypothyroidism    Major depressive disorder, single episode, moderate (CMS/HCC)    Osteoarthrosis    CAD (coronary artery disease)    Hypertension associated with type 2 diabetes mellitus (CMS/HCC)    PAD (peripheral artery disease) (CMS/HCC)    Subclavian artery stenosis, left (CMS/HCC)            I spent   minutes in the professional and overall care of this patient.      Tommy Dietz MD

## 2024-02-13 NOTE — PROGRESS NOTES
02/13/24 1445   Patient Choice   Provider Choice list and CMS website (https://medicare.gov/care-compare#search) for post-acute Quality and Resource Measure Data were provided and reviewed with: Patient   Patient / Family choosing to utilize agency / facility established prior to hospitalization No     Plan is for [facility] for new oxygen and diabetic education. UPDATE:  Patient had resp. Eval, does not qualify for home oxygen. Messaged PCP, that patient will not need oxygen or home care. Patient plan is home, no needs.

## 2024-02-13 NOTE — PROGRESS NOTES
Occupational Therapy    OT Treatment    Patient Name: Daphney Chen  MRN: [id number]  Today's Date: 2/13/2024  Time Calculation  Start Time: 1010  Stop Time: 1024  Time Calculation (min): 14 min         Assessment:  End of Session Communication: Bedside nurse  End of Session Patient Position: Bed, 2 rail up         Subjective   Previous Visit Info:  OT Last Visit  OT Received On: 02/13/24  General:  General  Prior to Session Communication: Bedside nurse  Patient Position Received: Bed, 2 rail up, Alarm on  General Comment: pt seated EOB with RN. pt agreeable to OT tx. pt requesting to return to sitting EOB at EOS RN ok with it.  Precautions:  Medical Precautions: Fall precautions  Vital Signs:  Vital Signs  SpO2:  (pt on 2 L of o2 and spo2 ~ 88-93% with activity)  Pain:  Pain Assessment  Pain Assessment: 0-10  Pain Score: 0 - No pain    Objective    Cognition:  Cognition  Overall Cognitive Status: Within Functional Limits    Functional Standing Tolerance:  Functional Standing Tolerance Comments: pt demos F unsupported standing balance during toilet hygiene tasks, pt standing performing clothing mgmt with SBA and able to perform pavel hygiene while seated.  Bed Mobility/Transfers: Transfers  Transfer:  (pt performing various functional transfers with use of FWW and required SBA for safety. pt educated on o2 line mgmt when performing with fair carry over not.)      Outcome Measures:[facility] Daily Activity  Putting on and taking off regular lower body clothing: A little  Bathing (including washing, rinsing, drying): A little  Putting on and taking off regular upper body clothing: A little  Toileting, which includes using toilet, bedpan or urinal: A little  Taking care of personal grooming such as brushing teeth: A little  Eating Meals: None  Daily Activity - Total Score: 19        Education Documentation  ADL Training, taught by CAMI Mcdonald at 2/13/2024  1:15 PM.  Learner: Patient  Readiness: Acceptance  Method:  Explanation  Response: Needs Reinforcement  Comment: pt educated on OT POC and EC techs such as seated rest breaks, use of LH AE. pt educated on monitoring spo2 with pulse ox    Education Comments  No comments found.        OP EDUCATION:       Goals:  Encounter Problems       Encounter Problems (Active)       OT Goals       Pt will dress LB with SBA (Progressing)       Start:  02/08/24    Expected End:  02/22/24            Pt will demo fair + dyn std balance with ADLS (Progressing)       Start:  02/08/24    Expected End:  02/22/24            Pt will verbalize 3 energy conservation strategies to increase indep with aDLS (Progressing)       Start:  02/08/24    Expected End:  02/22/24            Pt will transfer to bed, chair, toilet with modified indep (Progressing)       Start:  02/08/24    Expected End:  02/22/24

## 2024-02-13 NOTE — TREATMENT PLAN
Pulse Oximetry on room air at rest:   94%    2.   Pulse Oximetry on room air during ambulation:  90%    3.   Pulse Oximetry on oxygen during ambulation:  NA    4.   Amount of oxygen during ambulation:  NA    5.   Pulse oximetry during recovery:  NA    6.   Amount of oxygen during recovery:   NA    Does this patient qualify for home O2?   NO    What is the patient's Pulmonary Diagnosis:    What is the patient's DME company:    Comments:

## 2024-02-13 NOTE — PROGRESS NOTES
02/13/24 1440   Current Planned Discharge Disposition   Current Planned Discharge Disposition Home Healt     Patient will discharge with [facility] for nursing only.

## 2024-02-13 NOTE — PROGRESS NOTES
[facility] Progress Note           Rounding Cardiologist:  Eliz CALLEJAS/Artur Grider APRN-CNP  Primary Cardiologist: Dr. Ronald Wellington    Date:  2/13/2024  Patient:  Daphney Chen  YOB: 1960  MRN:  [id number]   Admit Date:  2/7/2024      SUBJECTIVE:    Daphney Chen was seen and examined today at bedside.   She denies any chest pain or shortness of breath.   Blood pressures have been better  Infectious disease on board, changed antibiotics for UTI      VITALS:     Vitals:    02/12/24 2253 02/13/24 0317 02/13/24 0500 02/13/24 0728   BP: 117/80  103/65 114/73   BP Location:       Patient Position:       Pulse: 83  72 84   Resp:       Temp: 36.3 °C (97.3 °F)   36.2 °C (97.2 °F)   TempSrc:       SpO2: 98%  95% 96%   Weight:  104 kg (230 lb 6.1 oz)     Height:           Intake/Output Summary (Last 24 hours) at 2/13/2024 0918  Last data filed at 2/12/2024 2200  Gross per 24 hour   Intake 360 ml   Output --   Net 360 ml         Wt Readings from Last 4 Encounters:   02/13/24 104 kg (230 lb 6.1 oz)   01/31/24 105 kg (232 lb)   01/30/24 105 kg (232 lb)   12/05/23 106 kg (234 lb)       CURRENT MEDICATIONS:   albuterol, 2 puff, inhalation, q4h  aspirin, 81 mg, oral, Daily  atorvastatin, 80 mg, oral, Nightly  cefepime, 1 g, intravenous, q8h  ergocalciferol, 50,000 Units, oral, Weekly  gabapentin, 400 mg, oral, TID  glipiZIDE, 5 mg, oral, Daily before breakfast  insulin regular, 0-10 Units, subcutaneous, TID with meals  levothyroxine, 137 mcg, oral, Daily  melatonin, 10 mg, oral, Nightly  metFORMIN, 1,000 mg, oral, BID with meals  metoprolol succinate XL, 12.5 mg, oral, Daily  midodrine, 2.5 mg, oral, BID  polyethylene glycol, 17 g, oral, Daily  potassium chloride CR, 20 mEq, oral, Daily  rivaroxaban, 2.5 mg, oral, BID  tiotropium, 2 Inhalation, inhalation, Daily  topiramate, 25 mg, oral, Nightly      oxygen, , Last Rate: 3 L/min (02/11/24 2050)  sodium chloride 0.9%, 75 mL/hr, Last Rate: 75 mL/hr (02/11/24  1143)      Current Outpatient Medications   Medication Instructions    albuterol 90 mcg/actuation inhaler 2 puffs, inhalation, Every 4 hours    aspirin 81 mg chewable tablet 1 tablet, oral, Daily    atorvastatin (LIPITOR) 80 mg, oral, Nightly    cefdinir (OMNICEF) 300 mg, oral, 2 times daily    ergocalciferol (VITAMIN D-2) 50,000 Units, oral, Weekly    gabapentin (NEURONTIN) 400 mg, oral, 3 times daily    levothyroxine (SYNTHROID, LEVOXYL) 137 mcg, oral, Daily    losartan (COZAAR) 50 mg, oral, Daily    melatonin 10 mg tablet 1 tablet, oral, Nightly    metFORMIN (GLUCOPHAGE) 1,000 mg, oral, 2 times daily with meals    metoprolol succinate XL (TOPROL-XL) 25 mg, oral, Daily    naloxone (NARCAN) 4 mg, nasal, As needed    nitroglycerin (NITROSTAT) 0.4 mg, sublingual, Every 5 min PRN    OneTouch Delica Plus Lancet 33 gauge misc Daily, AS DIRECTED    OneTouch Verio test strips strip Daily    oxyCODONE (ROXICODONE) 15 mg, oral, Every 6 hours PRN    potassium chloride CR 20 mEq ER tablet TAKE 1 TABLET(20 MEQ) BY MOUTH EVERY DAY. DO NOT CRUSH OR CHEW    rivaroxaban (XARELTO) 2.5 mg, oral, 2 times daily    Spiriva Respimat 2.5 mcg/actuation inhaler 2 puffs, inhalation, Daily    topiramate (Topamax) 25 mg tablet     torsemide (DEMADEX) 40 mg, oral, Daily        PHYSICAL EXAMINATION:   GENERAL:  Well developed, well nourished, in no acute distress.  CHEST:  Symmetric and nontender.  NEURO/PSYCH:  Alert and oriented times three with approppriate behavior and responses.  NECK:  Supple, no JVD, no bruit.  LUNGS: Scattered rhonchi, no wheezing or rales, diminished basal breath sounds  HEART:  Rate and rhythm regular with no evident murmur, no gallop appreciated.        There are no rubs, clicks or heaves.  EXTREMITIES:  Warm with good color, no clubbing or cyanosis.  There is trace edema noted.  PERIPHERAL VASCULAR:  Pulses present and equally palpable; 2+ throughout.      LAB DATA:     CBC:   Results from last 7 days   Lab Units  "02/11/24  0937   WBC AUTO x10*3/uL 5.2   RBC AUTO x10*6/uL 4.16   HEMOGLOBIN g/dL 11.5*   HEMATOCRIT % 38.1   PLATELETS AUTO x10*3/uL 186          CMP:    Results from last 7 days   Lab Units 02/13/24  0545   SODIUM mmol/L 140   POTASSIUM mmol/L 4.2   CHLORIDE mmol/L 105   CO2 mmol/L 28   BUN mg/dL 11   CREATININE mg/dL 0.68   GLUCOSE mg/dL 182*   CALCIUM mg/dL 8.7         Cardiac Enzymes:    Lab Results   Component Value Date    TROPHS 17 (H) 02/07/2024    TROPHS 17 (H) 02/07/2024    TROPHS 245 (H) 06/25/2023           BNP:   Lab Results   Component Value Date    BNP 43 02/07/2024        HgBA1c:    Lab Results   Component Value Date    HGBA1C 7.8 (H) 11/30/2023       CBC:  Lab Results   Component Value Date    WBC 5.2 02/11/2024    RBC 4.16 02/11/2024    HGB 11.5 (L) 02/11/2024    HCT 38.1 02/11/2024    MCV 92 02/11/2024    MCH 27.6 02/11/2024    MCHC 30.2 (L) 02/11/2024    RDW 15.6 (H) 02/11/2024     02/11/2024       BMP:  Lab Results   Component Value Date     02/13/2024     (L) 02/12/2024    K 4.2 02/13/2024    K 4.3 02/12/2024     02/13/2024     02/12/2024    CO2 28 02/13/2024    CO2 27 02/12/2024    BUN 11 02/13/2024    BUN 11 02/12/2024    CREATININE 0.68 02/13/2024    CREATININE 0.67 02/12/2024   No results found for: \"ALKPHOS\", \"ALT\", \"AST\", \"PROT\", \"BILITOT\", \"BILIDIR\"      DIAGNOSTIC TESTING:     ECG 12 lead    Result Date: 2/7/2024  Normal sinus rhythm Left ventricular hypertrophy with repolarization abnormality Inferior infarct (cited on or before 07-FEB-2024) Abnormal ECG When compared with ECG of 07-FEB-2024 16:00, (unconfirmed) No significant change was found See ED provider note for full interpretation and clinical correlation Confirmed by Osiel Marshall ([id number]) on 2/7/2024 10:53:11 PM    CT angio chest for pulmonary embolism    Result Date: 2/7/2024  STUDY: CT Angiogram of the Chest; 02/07/2024 at 7:14 PM INDICATION: Shortness of breath, lightheadedness. Elevated " D-dimer. Evaluate for pulmonary embolism. COMPARISON: XR chest of same date, 02/07/24. CTA chest 02/01/23. CTA CAP 07/27/23. ACCESSION NUMBER(S): [id number] ORDERING CLINICIAN: RODRIGO PUTNAM TECHNIQUE:  CTA of the chest was performed with intravenous contrast. Images are reviewed and processed at a workstation according to the CT angiogram protocol with 3-D and/or MIP post processing imaging generated.  Omnipaque-350 75 mL was administered intravenously. Automated mA/kV exposure control was utilized and patient examination was performed in strict accordance with principles of ALARA. FINDINGS: There are no detectable pulmonary emboli. The pulmonary arteries are normal caliber. The heart is normal overall size with minimal LV dilatation. There are minimal coronary artery calcified lesions. There is no thoracic aortic aneurysm or dissection. There is minimal atherosclerotic thrombus in the mid descending thoracic aorta. Small lymph nodes are seen in the mediastinum. There is no bulky lymphadenopathy or focal mass. There is no pericardial effusion. The lungs demonstrate minimal to moderate upper lobe-predominant centrilobular emphysema, in addition to minimal bullous emphysema at the left lung base. There is minimal biapical scarring, with additional focal scarring or atelectasis in the lung bases. There are no pleural effusions. There is no pneumothorax. Previous extensive surgery is seen in the lower thoracic and lumbar spine, with spinal rods in place. Rib cage appears intact. Upper abdomen suggests subtle cirrhotic morphology in the liver.    1. No detectable pulmonary emboli. 2. COPD; no infiltrates or vascular congestion. 3. Remainder as above. Signed by Micheal Diop MD    XR chest 1 view    Result Date: 2/7/2024  STUDY: Chest Radiograph;  2/7/2024 at 4:59 PM. INDICATION: Shortness of breath. COMPARISON: CTA CAP 7/27/2023; XR chest 6/24/2023, 3/1/2023; CTA chest 2/1/2023. ACCESSION NUMBER(S): [id number]  ORDERING CLINICIAN: RODRIGO PUTNAM TECHNIQUE:  Frontal chest was obtained at 16:59 hours (two images). FINDINGS: CARDIOMEDIASTINAL SILHOUETTE: Cardiomediastinal silhouette is normal in size and configuration.  LUNGS: Lungs are clear.  ABDOMEN: No remarkable upper abdominal findings.  BONES: No acute osseous changes.    No acute pulmonary pathology. Signed by Tra Garcia MD       RADIOLOGY:     CT angio chest for pulmonary embolism   Final Result   1. No detectable pulmonary emboli.   2. COPD; no infiltrates or vascular congestion.   3. Remainder as above.   Signed by Micheal Diop MD      XR chest 1 view   Final Result   No acute pulmonary pathology.   Signed by Tra Garcia MD          PROBLEM LIST     Patient Active Problem List   Diagnosis    Complex regional pain syndrome type 1 of left upper extremity    PVD (peripheral vascular disease) (CMS/HCC)    Primary hypertension    Type 2 diabetes mellitus, with long-term current use of insulin (CMS/HCC)    Vaginal bleeding    Combined systolic and diastolic cardiac dysfunction    Impaired functional mobility, balance, gait, and endurance    Cystitis    Gastroesophageal reflux disease without esophagitis    Hypothyroidism    Major depressive disorder, single episode, moderate (CMS/HCC)    Degeneration of lumbar intervertebral disc    Disseminated intravascular coagulation (CMS/HCC)    Nicotine use disorder    Obesity, Class I, BMI 30-34.9    Osteoarthrosis    Other voice and resonance disorders    Postoperative pain after spinal surgery    Posttraumatic muscle contracture following injury (CMS/HCC)    Pseudarthrosis following spinal fusion    Contusion of lower back    Lumbar radiculopathy    Sprain of sacroiliac ligament    Spinal stenosis of lumbar region    Sprain, low back    Tobacco use disorder    Traumatic compartment syndrome of left upper extremity (CMS/HCC)    Chronic low back pain    Chronic pain of left upper extremity    Pain of left hand     Degeneration of cervical intervertebral disc    Abnormal computed tomography angiography (CTA)    Ascending aortic aneurysm (CMS/HCC)    Asymptomatic stenosis of left carotid artery    Bilateral leg edema    CAD (coronary artery disease)    Class 2 obesity with body mass index (BMI) of 36.0 to 36.9 in adult    Hypertension associated with type 2 diabetes mellitus (CMS/HCC)    Infrarenal abdominal aortic aneurysm (AAA) without rupture (CMS/HCC)    Leg pain    Mixed diabetic hyperlipidemia associated with type 2 diabetes mellitus (CMS/HCC)    Moderate generalized edema    PAD (peripheral artery disease) (CMS/HCC)    Subclavian artery stenosis, left (CMS/HCC)    Weight gain    Hypotension, unspecified hypotension type       ASSESSMENT:   Impression:  Hypotension persisted in the face of increased lactate and reported urinary tract infection, resolved  Normal, cardiac by most recent echo  No active cardiovascular symptoms  Hyperlipidemia  Type 2 diabetes on insulin  COPD  Obesity  Peripheral vascular disease  History of abdominal aortic aneurysm  PLAN:   Recommendation:  Infectious diseases has been consulted to rule out any more extensive infection that may be leading to hypotension and lactic acidosis, antibiotics changed  Continue gentle hydration, monitor strict I's and O's and daily weight  Monitor electrolytes, keep potassium greater than 4 magnesium greater than 2  Will plan to hold off on diuretics on discharge and Dr. Wellington can follow-up in office in regards to resuming  Would be okay resuming beta-blocker however may need to hold losartan until follow-up visit.  Resume at half dose.  Message sent to schedulers for follow-up with Dr. Wellington  Plan to sign off for the next day or so  Please reconsult for any further needs    Artur Grider [facility]  Adult Gerontology Acute Care Nurse Practitioner  [facility] [place]   [facility]  [place]  [phone]      Patient seen and examined in conjunction with TIMUR Whitlock/CNP and agree with the evaluation as noted above.  Patient is doing well but she remains relatively hypotensive, receiving IV fluids.  Blood pressure medications are currently on hold including diuretics, currently getting treated for UTI.  Agree with resuming beta-blockers but at lower dose of Toprol-XL 12.5 mg daily and continue to monitor orthostatic blood pressure.  She may need low-dose midodrine if she continues to have symptomatic hypotension.  We will reevaluate the patient in the a.m.  AKA    2/13/24  Patient is alert and oriented x 3.  Mentation appropriate  Supplemental O2, currently on a couple liters of oxygen with good oxygen saturation  BPs have improved, midodrine added to support blood pressure while receiving her chronic pain medications  Losartan and Demadex to be held  Resumed metoprolol to half dose dosing  Continue to monitor Ortho's  Will follow for 1 more day to assess blood pressure parameters    Artur Grider [facility]  Adult Gerontology Acute Care Nurse Practitioner  [facility] [place]   [facility]  [phone]    Patient seen and examined in conjunction with TIMUR Whitlock/CNP and agree with the evaluation as noted above.  Patient feels better with improved blood pressure.  She has not had any significant dizzy spells with ambulation.  She was started on low-dose midodrine and the blood pressure appears improved.  Agree with plans to observe overnight to continue to monitor how she responds to the low-dose midodrine and consider possible discharge in a.m. if she continues to improve.  AKA

## 2024-02-14 ENCOUNTER — PATIENT OUTREACH (OUTPATIENT)
Dept: PRIMARY CARE | Facility: CLINIC | Age: 64
End: 2024-02-14
Payer: MEDICARE

## 2024-02-14 DIAGNOSIS — N39.0 URINARY TRACT INFECTION WITHOUT HEMATURIA, SITE UNSPECIFIED: ICD-10-CM

## 2024-02-14 DIAGNOSIS — R09.02 HYPOXIA: ICD-10-CM

## 2024-02-14 DIAGNOSIS — E11.9 TYPE 2 DIABETES MELLITUS WITHOUT COMPLICATION, WITH LONG-TERM CURRENT USE OF INSULIN (MULTI): ICD-10-CM

## 2024-02-14 DIAGNOSIS — I95.9 HYPOTENSION, UNSPECIFIED HYPOTENSION TYPE: ICD-10-CM

## 2024-02-14 DIAGNOSIS — J44.1 COPD EXACERBATION (MULTI): ICD-10-CM

## 2024-02-14 DIAGNOSIS — Z79.4 TYPE 2 DIABETES MELLITUS WITHOUT COMPLICATION, WITH LONG-TERM CURRENT USE OF INSULIN (MULTI): ICD-10-CM

## 2024-02-14 PROBLEM — M48.061 SPINAL STENOSIS OF LUMBAR REGION: Status: RESOLVED | Noted: 2018-07-30 | Resolved: 2024-02-14

## 2024-02-14 PROBLEM — E66.811 OBESITY, CLASS I, BMI 30-34.9: Status: RESOLVED | Noted: 2022-05-19 | Resolved: 2024-02-14

## 2024-02-14 PROBLEM — M79.642 PAIN OF LEFT HAND: Status: RESOLVED | Noted: 2023-08-22 | Resolved: 2024-02-14

## 2024-02-14 PROBLEM — I10 PRIMARY HYPERTENSION: Status: RESOLVED | Noted: 2023-07-09 | Resolved: 2024-02-14

## 2024-02-14 PROBLEM — R63.5 WEIGHT GAIN: Status: RESOLVED | Noted: 2023-08-22 | Resolved: 2024-02-14

## 2024-02-14 PROBLEM — T79.A12A: Status: RESOLVED | Noted: 2018-03-15 | Resolved: 2024-02-14

## 2024-02-14 PROBLEM — E66.01 OBESITY, MORBID (MULTI): Status: ACTIVE | Noted: 2023-08-22

## 2024-02-14 PROBLEM — D65 DISSEMINATED INTRAVASCULAR COAGULATION (MULTI): Status: RESOLVED | Noted: 2018-07-30 | Resolved: 2024-02-14

## 2024-02-14 PROBLEM — R93.89 ABNORMAL COMPUTED TOMOGRAPHY ANGIOGRAPHY (CTA): Status: RESOLVED | Noted: 2023-08-22 | Resolved: 2024-02-14

## 2024-02-14 PROBLEM — I73.9 PAD (PERIPHERAL ARTERY DISEASE) (CMS-HCC): Status: RESOLVED | Noted: 2023-08-22 | Resolved: 2024-02-14

## 2024-02-14 PROBLEM — I74.09 OTHER ARTERIAL EMBOLISM AND THROMBOSIS OF ABDOMINAL AORTA (MULTI): Status: RESOLVED | Noted: 2024-02-14 | Resolved: 2024-02-14

## 2024-02-14 PROBLEM — M79.606 LEG PAIN: Status: RESOLVED | Noted: 2023-08-22 | Resolved: 2024-02-14

## 2024-02-14 PROBLEM — T79.6XXA: Status: RESOLVED | Noted: 2018-07-30 | Resolved: 2024-02-14

## 2024-02-14 PROBLEM — F17.200 NICOTINE USE DISORDER: Status: RESOLVED | Noted: 2020-10-08 | Resolved: 2024-02-14

## 2024-02-14 PROBLEM — J96.01 ACUTE RESPIRATORY FAILURE WITH HYPOXIA (MULTI): Status: ACTIVE | Noted: 2024-02-14

## 2024-02-14 PROBLEM — I50.33 ACUTE ON CHRONIC DIASTOLIC (CONGESTIVE) HEART FAILURE (MULTI): Status: ACTIVE | Noted: 2024-02-14

## 2024-02-14 PROBLEM — N30.90 CYSTITIS: Status: RESOLVED | Noted: 2020-10-07 | Resolved: 2024-02-14

## 2024-02-14 PROBLEM — E66.9 OBESITY, CLASS I, BMI 30-34.9: Status: RESOLVED | Noted: 2022-05-19 | Resolved: 2024-02-14

## 2024-02-14 PROBLEM — R60.1: Status: RESOLVED | Noted: 2023-08-22 | Resolved: 2024-02-14

## 2024-02-14 PROBLEM — I25.119 CHEST PAIN DUE TO CAD (CMS-HCC): Status: ACTIVE | Noted: 2024-02-14

## 2024-02-14 PROBLEM — N93.9 VAGINAL BLEEDING: Status: RESOLVED | Noted: 2023-07-09 | Resolved: 2024-02-14

## 2024-02-14 PROBLEM — I74.09 OTHER ARTERIAL EMBOLISM AND THROMBOSIS OF ABDOMINAL AORTA (MULTI): Status: ACTIVE | Noted: 2024-02-14

## 2024-02-14 ASSESSMENT — ENCOUNTER SYMPTOMS
ACTIVITY CHANGE: 0
BLOOD IN STOOL: 0
JOINT SWELLING: 0
SPEECH DIFFICULTY: 0
HEMATURIA: 0
PALPITATIONS: 0
LIGHT-HEADEDNESS: 0
EYE REDNESS: 0
STRIDOR: 0
FEVER: 0
CHEST TIGHTNESS: 0

## 2024-02-14 NOTE — PROGRESS NOTES
Discharge Facility:  [facility]    Discharge Diagnosis:  Hypotension, unspecified hypotension type  Urinary tract infection without hematuria, site unspecified  Hypoxia  COPD exacerbation (CMS/HCC)  Type 2 diabetes mellitus without complication, with long-term current use of insulin (CMS/HCC)    Admission Date:2/7/24  Discharge Date: 2/13/24    PCP Appointment Date:  MAR 13  2024 11:15 AM - Primary Care Established  [facility] - ENRIQUE Aguilar     Specialist Appointment Date:   FEB 16 2024 02:30 PM - General Surgery Established Patient  [facility] [place] - Juana Pereira MD     FEB 20 2024 01:45 PM - Cardiology Hospital Discharge  [facility] - Ronald Wellington MD     Hospital Encounter and Summary: Linked   I left voicemail to introduce myself and the TCM program to Daphney Chen. Reviewed upcoming appts and encouraged patient to keep them. I gave my contact information to return my call so we can go over discharge instructions and see if I can assist in anyway.    I reached out to Cardio office and they were able to reach pt and reschedule appt into the recommend 14 day follow up time frame.

## 2024-02-14 NOTE — DOCUMENTATION CLARIFICATION NOTE
"    PATIENT:               IVETTE GUILLAUME  ACCT #:                  [id number]  MRN:                       [id number]  :                       1960  ADMIT DATE:       2024 3:08 PM  DISCH DATE:        2024 6:50 PM  RESPONDING PROVIDER #:        [id number]          PROVIDER RESPONSE TEXT:    Acute Hypoxemic Respiratory Failure    CDI QUERY TEXT:    [facility]_Respiratory Failure Acute    Instruction:    Based on your assessment of the patient and the clinical information, please provide the requested documentation by clicking on the appropriate radio button and enter any additional information if prompted.    Question: Is there a diagnosis indicative of the clinical information    When answering this query, please exercise your independent professional judgment. The fact that a question is being asked, does not imply that any particular answer is desired or expected.    The patient's clinical indicators include:  Clinical Information: Admit with copde and uti    Clinical Indicators:    Per ED note \"Given the patient's findings of COPD exacerbation with hypoxia, UTI, and hypotension I do feel she would benefit from mission for further evaluation and treatment.\"    Per VS record, pulse ox range during stay 87-97% on room air to 4.5L per NC to maintain stats.    Progress note 24 states \"Patient's discharge was held yesterday.  Remained hypoxic.  4 L nasal cannula.  Does not wear oxygen at home\" and \"Continue has difficulty with shortness of breath.' and \"Lightheaded\" and \" Nasal cannula oxygen in place diminished breath sounds prolonged expiratory phase\"    No abgs/vbgs noted.    Treatment: solumedrol, supplemental oxygen up to 4.5L, nebs, DC held    Risk Factors: copde with hypoxia, shortness of breath needing up to 4.5L of oxygen to maintain sats  Options provided:  -- Acute Hypoxemic Respiratory Failure  -- No acute respiratory failure  -- Other - I will add my own diagnosis  -- Refer to Clinical Documentation " Reviewer    Query created by: Cash Marinelli on 2/9/2024 11:47 AM      Electronically signed by:  GILBERTO ACEVEDO MD 2/14/2024 10:11 AM

## 2024-02-14 NOTE — PROGRESS NOTES
CHIEF COMPLAINT:    Daphney Chen is a 64 y.o. female who presents for Results and Follow-up (Patient here for follow-up on test results. ).    HISTORY OF PRESENT ILLNESS:  Daphney Chen  is a pleasant 64-year-old female with quite a few medical comorbidities along with diabetes, hypertension hyperlipidemia, obesity, vasculopath recently took a mammogram to screen for CA breast.  She was found to have a mass at the 2 o'clock position on the right breast.  Patient was here in the office to discuss about the abnormal findings of the mammogram.  She does not have any pain or discharge from the nipple.  She missed her mammogram for some time.  This is being done after so many years.  Patient would need ultrasound-guided biopsy.  She has been referred to breast surgeon.  She is already scheduled to have ultrasound-guided biopsy.  Patient was reassured.  I have seen the imaging studies closely which may not be a malignant tumor.  However the final diagnosis can only be established after receiving the biopsy result.  Patient appears to be somewhat anxious but she is willing to go for further treatment.  Otherwise she does not have any acute medical complaint.  She does not need any medication refill.        Review of Systems   Constitutional:  Negative for activity change and fever.   HENT:  Negative for hearing loss, nosebleeds and tinnitus.    Eyes:  Negative for redness.   Respiratory:  Negative for chest tightness and stridor.    Cardiovascular:  Negative for chest pain, palpitations and leg swelling.   Gastrointestinal:  Negative for blood in stool.   Endocrine: Negative for cold intolerance.   Genitourinary:  Negative for hematuria.   Musculoskeletal:  Negative for joint swelling.   Skin:  Negative for rash.   Neurological:  Negative for speech difficulty and light-headedness.   Psychiatric/Behavioral:  Negative for behavioral problems.      Visit Vitals  /68 (BP Location: Right arm, Patient Position:  "Sitting, BP Cuff Size: Adult)   Pulse 75   Temp 36.6 °C (97.8 °F) (Temporal)   Ht 1.702 m (5' 7\")   Wt 105 kg (232 lb)   SpO2 93%   BMI 36.34 kg/m²   Smoking Status Former   BSA 2.23 m²         Wt Readings from Last 10 Encounters:   02/13/24 104 kg (230 lb 6.1 oz)   01/31/24 105 kg (232 lb)   01/30/24 105 kg (232 lb)   12/05/23 106 kg (234 lb)   11/07/23 106 kg (232 lb 12.8 oz)   10/03/23 105 kg (231 lb)   09/05/23 103 kg (227 lb)   08/15/23 102 kg (225 lb)   07/20/23 105 kg (232 lb)   07/13/23 105 kg (232 lb)       Physical Exam  Constitutional:       General: She is not in acute distress.     Appearance: Normal appearance.   HENT:      Head: Normocephalic.      Right Ear: Tympanic membrane normal.      Left Ear: Tympanic membrane normal.      Mouth/Throat:      Mouth: Mucous membranes are moist.   Cardiovascular:      Rate and Rhythm: Normal rate and regular rhythm.      Heart sounds: No murmur heard.  Pulmonary:      Effort: No respiratory distress.   Abdominal:      Palpations: Abdomen is soft.   Musculoskeletal:      Cervical back: Neck supple.      Right lower leg: No edema.      Left lower leg: No edema.   Skin:     Findings: No rash.   Neurological:      General: No focal deficit present.      Mental Status: She is alert and oriented to person, place, and time.   Psychiatric:         Mood and Affect: Mood normal.        RECENT LABS:  Lab Results   Component Value Date    WBC 5.2 02/11/2024    HGB 11.5 (L) 02/11/2024    HCT 38.1 02/11/2024     02/11/2024    CHOL 116 11/30/2023    TRIG 85 11/30/2023    HDL 58.6 11/30/2023    ALT 38 02/08/2024    AST 43 (H) 02/08/2024     02/13/2024    K 4.2 02/13/2024     02/13/2024    CREATININE 0.68 02/13/2024    BUN 11 02/13/2024    CO2 28 02/13/2024    TSH 19.24 (H) 11/30/2023    INR 1.1 02/07/2024    HGBA1C 7.8 (H) 11/30/2023     IMAGING:  === 02/07/24 ===    XR CHEST 1 VIEW    - Impression -  No acute pulmonary pathology.  Signed by Tra Garcia MD "   === 02/07/24 ===    CT ANGIO CHEST FOR PULMONARY EMBOLISM    - Impression -  1. No detectable pulmonary emboli.  2. COPD; no infiltrates or vascular congestion.  3. Remainder as above.  Signed by Micheal Diop MD   Reviewed:   MEDICATIONS:   Current Outpatient Medications   Medication Instructions    albuterol 90 mcg/actuation inhaler 2 puffs, inhalation, Every 4 hours    aspirin 81 mg chewable tablet 1 tablet, oral, Daily    atorvastatin (LIPITOR) 80 mg, oral, Nightly    cefdinir (OMNICEF) 300 mg, oral, 2 times daily    ergocalciferol (VITAMIN D-2) 50,000 Units, oral, Weekly    gabapentin (NEURONTIN) 400 mg, oral, 3 times daily    glipiZIDE (GLUCOTROL) 5 mg, oral, Daily before breakfast    levothyroxine (SYNTHROID, LEVOXYL) 137 mcg, oral, Daily    losartan (COZAAR) 50 mg, oral, Daily    melatonin 10 mg tablet 1 tablet, oral, Nightly    metFORMIN (GLUCOPHAGE) 1,000 mg, oral, 2 times daily with meals    metoprolol succinate XL (TOPROL-XL) 25 mg, oral, Daily    midodrine (PROAMATINE) 2.5 mg, oral, 2 times daily with meals    naloxone (NARCAN) 4 mg, nasal, As needed    nitroglycerin (NITROSTAT) 0.4 mg, sublingual, Every 5 min PRN    OneTouch Delica Plus Lancet 33 gauge misc Daily, AS DIRECTED    OneTouch Verio test strips strip Daily    oxyCODONE (ROXICODONE) 15 mg, oral, Every 6 hours PRN    potassium chloride CR 20 mEq ER tablet TAKE 1 TABLET(20 MEQ) BY MOUTH EVERY DAY. DO NOT CRUSH OR CHEW    rivaroxaban (XARELTO) 2.5 mg, oral, 2 times daily    Spiriva Respimat 2.5 mcg/actuation inhaler 2 puffs, inhalation, Daily    topiramate (Topamax) 25 mg tablet     torsemide (DEMADEX) 40 mg, oral, Daily      TODAY'S VISIT  DX:   1. Abnormal finding of diagnostic imaging        2. Other arterial embolism and thrombosis of abdominal aorta (CMS/HCC)        3. Acute respiratory failure with hypoxia (CMS/HCC)        4. Acute on chronic diastolic (congestive) heart failure (CMS/HCC)        5. Chest pain due to CAD (CMS/HCC)         6. Disseminated intravascular coagulation (CMS/HCC)        7. Obesity, morbid (CMS/HCC)           MEDICAL DECISION MAKING:  - Recent lab work and relevant imaging studies have been reviewed.    - The current active medical co morbidities have been considered.   - Relevant correspondence/notes from specialty consultants were reviewed and discussed with patient.    - Patient was was reassured and encouraged to go for ultrasound-guided biopsy of the breast mass.  - Patient will continue with current medical therapy and plan.    - Next Follow up in 3 months..

## 2024-02-16 ENCOUNTER — OFFICE VISIT (OUTPATIENT)
Dept: SURGERY | Facility: HOSPITAL | Age: 64
End: 2024-02-16
Payer: MEDICARE

## 2024-02-16 VITALS
WEIGHT: 229 LBS | HEART RATE: 84 BPM | BODY MASS INDEX: 34.82 KG/M2 | SYSTOLIC BLOOD PRESSURE: 110 MMHG | DIASTOLIC BLOOD PRESSURE: 83 MMHG

## 2024-02-16 DIAGNOSIS — N63.41 SUBAREOLAR MASS OF RIGHT BREAST: Primary | ICD-10-CM

## 2024-02-16 PROCEDURE — 1111F DSCHRG MED/CURRENT MED MERGE: CPT | Performed by: SURGERY

## 2024-02-16 PROCEDURE — 1125F AMNT PAIN NOTED PAIN PRSNT: CPT | Performed by: SURGERY

## 2024-02-16 PROCEDURE — 1036F TOBACCO NON-USER: CPT | Performed by: SURGERY

## 2024-02-16 PROCEDURE — 3074F SYST BP LT 130 MM HG: CPT | Performed by: SURGERY

## 2024-02-16 PROCEDURE — 3079F DIAST BP 80-89 MM HG: CPT | Performed by: SURGERY

## 2024-02-16 PROCEDURE — 4010F ACE/ARB THERAPY RXD/TAKEN: CPT | Performed by: SURGERY

## 2024-02-16 PROCEDURE — 1159F MED LIST DOCD IN RCRD: CPT | Performed by: SURGERY

## 2024-02-16 PROCEDURE — 99213 OFFICE O/P EST LOW 20 MIN: CPT | Performed by: SURGERY

## 2024-02-16 PROCEDURE — 3008F BODY MASS INDEX DOCD: CPT | Performed by: SURGERY

## 2024-02-16 ASSESSMENT — ENCOUNTER SYMPTOMS
EYES NEGATIVE: 1
GASTROINTESTINAL NEGATIVE: 1
ENDOCRINE NEGATIVE: 1
RESPIRATORY NEGATIVE: 1
CONSTITUTIONAL NEGATIVE: 1
ALLERGIC/IMMUNOLOGIC NEGATIVE: 1
HEMATOLOGIC/LYMPHATIC NEGATIVE: 1
PSYCHIATRIC NEGATIVE: 1
NEUROLOGICAL NEGATIVE: 1
CARDIOVASCULAR NEGATIVE: 1
MUSCULOSKELETAL NEGATIVE: 1

## 2024-02-16 NOTE — PROGRESS NOTES
Subjective   Patient ID: Daphney Chen is a 64 y.o. female who presents for abnormal mammogram.   HPI  Status post ultrasound-guided core biopsy right breast with final pathology demonstrating fibroadenoma with usual ductal hyperplasia, benign and concordant.    Pt presents for right breast abnormal imaging and mass.  Patient has no breast complaints.  Has not noticed a mass.  No tenderness.  No skin changes or nipple discharge.    Imagin23 screening mammogram followed by RIGHT breast US 24: 2oclock 2CFN ovoid mass 1.5x0.6x1.1cm no shadowing, nonspecific ?fibroadenoma. Normal axilla. Category 4.     Xarelto for PVD (right ax-bifem bypass 2023) and CAD (ALAN ). Cleared to come off for biopsy.     Breast history:  Open left breast biopsy remotely for benign indications  No family history  G4, P4, no breast-feeding.  First pregnancy age 16  Menarche age 11.  Menopause age 48.  No hormone supplementation    Review of Systems   Constitutional: Negative.    HENT: Negative.     Eyes: Negative.    Respiratory: Negative.     Cardiovascular: Negative.    Gastrointestinal: Negative.    Endocrine: Negative.    Genitourinary: Negative.    Musculoskeletal: Negative.    Skin: Negative.    Allergic/Immunologic: Negative.    Neurological: Negative.    Hematological: Negative.    Psychiatric/Behavioral: Negative.         No past medical history on file.  Past Surgical History:   Procedure Laterality Date    ANTERIOR COMPARTMENT DECOMPRESSION      BREAST LUMPECTOMY      CARDIAC SURGERY      CAROTID STENT      CT AORTA AND BILATERAL ILIOFEMORAL RUNOFF ANGIOGRAM W AND/OR WO IV CONTRAST  2023    CT AORTA AND BILATERAL ILIOFEMORAL RUNOFF ANGIOGRAM W AND/OR WO IV CONTRAST 2023 [facility] CT    DILATION AND CURETTAGE OF UTERUS      LUMBAR LAMINECTOMY      TONSILLECTOMY      TOTAL KNEE ARTHROPLASTY       Family History   Problem Relation Name Age of Onset    Diabetes Mother      Hypertension Mother      Heart attack  Mother      Heart attack Daughter      Other (blood cancer) Daughter        Social History     Socioeconomic History    Marital status:      Spouse name: Not on file    Number of children: Not on file    Years of education: Not on file    Highest education level: Not on file   Occupational History    Not on file   Tobacco Use    Smoking status: Former     Types: Cigarettes     Quit date:      Years since quittin.1    Smokeless tobacco: Never   Vaping Use    Vaping Use: Never used   Substance and Sexual Activity    Alcohol use: Not Currently    Drug use: Never    Sexual activity: Defer   Other Topics Concern    Not on file   Social History Narrative    Not on file     Social Determinants of Health     Financial Resource Strain: Not on file   Food Insecurity: Not on file   Transportation Needs: Not on file   Physical Activity: Not on file   Stress: Not on file   Social Connections: Not on file   Intimate Partner Violence: Not on file   Housing Stability: Not on file          Current Outpatient Medications:     albuterol 90 mcg/actuation inhaler, Inhale 2 puffs every 4 hours., Disp: , Rfl:     aspirin 81 mg chewable tablet, Chew 1 tablet (81 mg) once daily., Disp: , Rfl:     atorvastatin (Lipitor) 80 mg tablet, Take 1 tablet (80 mg) by mouth once daily at bedtime., Disp: , Rfl:     ergocalciferol (Vitamin D-2) 1.25 MG (52077 UT) capsule, Take 1 capsule (50,000 Units) by mouth 1 (one) time per week., Disp: 12 capsule, Rfl: 3    gabapentin (Neurontin) 400 mg capsule, Take 1 capsule (400 mg) by mouth 3 times a day., Disp: , Rfl:     glipiZIDE (Glucotrol) 5 mg tablet, Take 1 tablet (5 mg) by mouth once daily in the morning. Take before meals. Do not start before 2024., Disp: 30 tablet, Rfl: 1    levothyroxine (Synthroid, Levoxyl) 137 mcg tablet, Take 1 tablet (137 mcg) by mouth once daily., Disp: 90 tablet, Rfl: 1    losartan (Cozaar) 50 mg tablet, Take 1 tablet (50 mg) by mouth once daily.,  Disp: , Rfl:     melatonin 10 mg tablet, Take 1 tablet (10 mg) by mouth once daily at bedtime., Disp: , Rfl:     metFORMIN (Glucophage) 1,000 mg tablet, Take 1 tablet (1,000 mg) by mouth 2 times a day with meals., Disp: 180 tablet, Rfl: 3    metoprolol succinate XL (Toprol-XL) 25 mg 24 hr tablet, Take 1 tablet (25 mg) by mouth once daily., Disp: , Rfl:     midodrine (Proamatine) 2.5 mg tablet, Take 1 tablet (2.5 mg) by mouth 2 times a day with meals., Disp: 60 tablet, Rfl: 1    naloxone (Narcan) 4 mg/0.1 mL nasal spray, Administer 1 spray (4 mg) into affected nostril(s) if needed for opioid reversal., Disp: , Rfl:     nitroglycerin (Nitrostat) 0.4 mg SL tablet, Place 1 tablet (0.4 mg) under the tongue every 5 minutes if needed for chest pain., Disp: , Rfl:     OneTouch Delica Plus Lancet 33 gauge misc, once daily. AS DIRECTED, Disp: , Rfl:     OneTouch Verio test strips strip, once daily., Disp: , Rfl:     oxyCODONE (Roxicodone) 15 mg immediate release tablet, Take 1 tablet (15 mg) by mouth every 6 hours if needed., Disp: , Rfl:     potassium chloride CR 20 mEq ER tablet, TAKE 1 TABLET(20 MEQ) BY MOUTH EVERY DAY. DO NOT CRUSH OR CHEW, Disp: 90 tablet, Rfl: 3    rivaroxaban (Xarelto) 2.5 mg tablet, Take 1 tablet (2.5 mg) by mouth 2 times a day., Disp: , Rfl:     Spiriva Respimat 2.5 mcg/actuation inhaler, Inhale 2 puffs once daily., Disp: , Rfl:     topiramate (Topamax) 25 mg tablet, , Disp: , Rfl:     torsemide (Demadex) 20 mg tablet, TAKE 2 TABLETS BY MOUTH EVERY DAY, Disp: 180 tablet, Rfl: 3     Objective   There were no vitals filed for this visit.   Physical Exam  Constitutional:       General: She is not in acute distress.     Appearance: Normal appearance.   HENT:      Head: Normocephalic and atraumatic.      Mouth/Throat:      Mouth: Mucous membranes are moist.   Eyes:      Pupils: Pupils are equal, round, and reactive to light.   Cardiovascular:      Rate and Rhythm: Normal rate and regular rhythm.    Pulmonary:      Effort: Pulmonary effort is normal.   Chest:      Chest wall: No mass, swelling or tenderness.   Breasts:     Right: Normal. No mass or tenderness.      Left: Normal. No mass or tenderness.      Comments: Mild bilateral symmetric nipple inversion  Right deltopectoral groove incision for axillary bypass procedure  Abdominal:      General: Abdomen is flat.      Palpations: Abdomen is soft.   Musculoskeletal:         General: Normal range of motion.      Cervical back: Normal range of motion.   Lymphadenopathy:      Cervical: No cervical adenopathy.      Upper Body:      Right upper body: No axillary adenopathy.      Left upper body: No axillary adenopathy.   Skin:     General: Skin is warm and dry.   Neurological:      Mental Status: She is alert. Mental status is at baseline.   Psychiatric:         Mood and Affect: Mood normal.         Assessment/Plan   Problem List Items Addressed This Visit    None  Visit Diagnoses         Codes    Subareolar mass of right breast    -  Primary N63.41        Reassured regarding benign pathology.  6-month follow-up with diagnostic right mammogram.    Juana Pereira MD

## 2024-02-18 ENCOUNTER — DOCUMENTATION (OUTPATIENT)
Dept: INPATIENT UNIT | Facility: HOSPITAL | Age: 64
End: 2024-02-18
Payer: MEDICARE

## 2024-02-20 ENCOUNTER — OFFICE VISIT (OUTPATIENT)
Dept: CARDIOLOGY | Facility: CLINIC | Age: 64
End: 2024-02-20
Payer: MEDICARE

## 2024-02-20 VITALS
HEIGHT: 68 IN | WEIGHT: 230.8 LBS | BODY MASS INDEX: 34.98 KG/M2 | HEART RATE: 76 BPM | SYSTOLIC BLOOD PRESSURE: 98 MMHG | DIASTOLIC BLOOD PRESSURE: 60 MMHG

## 2024-02-20 DIAGNOSIS — I15.2 HYPERTENSION ASSOCIATED WITH TYPE 2 DIABETES MELLITUS (MULTI): ICD-10-CM

## 2024-02-20 DIAGNOSIS — I25.10 CORONARY ARTERY DISEASE INVOLVING NATIVE CORONARY ARTERY OF NATIVE HEART WITHOUT ANGINA PECTORIS: ICD-10-CM

## 2024-02-20 DIAGNOSIS — E11.59 HYPERTENSION ASSOCIATED WITH TYPE 2 DIABETES MELLITUS (MULTI): ICD-10-CM

## 2024-02-20 DIAGNOSIS — E78.2 MIXED DIABETIC HYPERLIPIDEMIA ASSOCIATED WITH TYPE 2 DIABETES MELLITUS (MULTI): ICD-10-CM

## 2024-02-20 DIAGNOSIS — E11.00 TYPE 2 DIABETES MELLITUS WITH HYPEROSMOLARITY WITHOUT COMA, WITH LONG-TERM CURRENT USE OF INSULIN (MULTI): ICD-10-CM

## 2024-02-20 DIAGNOSIS — E11.69 MIXED DIABETIC HYPERLIPIDEMIA ASSOCIATED WITH TYPE 2 DIABETES MELLITUS (MULTI): ICD-10-CM

## 2024-02-20 DIAGNOSIS — Z79.4 TYPE 2 DIABETES MELLITUS WITH HYPEROSMOLARITY WITHOUT COMA, WITH LONG-TERM CURRENT USE OF INSULIN (MULTI): ICD-10-CM

## 2024-02-20 DIAGNOSIS — Z87.891 FORMER SMOKER: ICD-10-CM

## 2024-02-20 DIAGNOSIS — I50.33 ACUTE ON CHRONIC DIASTOLIC (CONGESTIVE) HEART FAILURE (MULTI): ICD-10-CM

## 2024-02-20 DIAGNOSIS — I71.21 ANEURYSM OF ASCENDING AORTA WITHOUT RUPTURE (CMS-HCC): ICD-10-CM

## 2024-02-20 PROCEDURE — 3074F SYST BP LT 130 MM HG: CPT | Performed by: INTERNAL MEDICINE

## 2024-02-20 PROCEDURE — 1036F TOBACCO NON-USER: CPT | Performed by: INTERNAL MEDICINE

## 2024-02-20 PROCEDURE — 99496 TRANSJ CARE MGMT HIGH F2F 7D: CPT | Performed by: INTERNAL MEDICINE

## 2024-02-20 PROCEDURE — 1159F MED LIST DOCD IN RCRD: CPT | Performed by: INTERNAL MEDICINE

## 2024-02-20 PROCEDURE — 3008F BODY MASS INDEX DOCD: CPT | Performed by: INTERNAL MEDICINE

## 2024-02-20 PROCEDURE — 1125F AMNT PAIN NOTED PAIN PRSNT: CPT | Performed by: INTERNAL MEDICINE

## 2024-02-20 PROCEDURE — 4010F ACE/ARB THERAPY RXD/TAKEN: CPT | Performed by: INTERNAL MEDICINE

## 2024-02-20 PROCEDURE — 3078F DIAST BP <80 MM HG: CPT | Performed by: INTERNAL MEDICINE

## 2024-02-20 PROCEDURE — 1111F DSCHRG MED/CURRENT MED MERGE: CPT | Performed by: INTERNAL MEDICINE

## 2024-02-20 RX ORDER — METOLAZONE 2.5 MG/1
TABLET ORAL
Qty: 36 TABLET | Refills: 3 | Status: SHIPPED | OUTPATIENT
Start: 2024-02-20 | End: 2024-02-22 | Stop reason: SDUPTHER

## 2024-02-20 RX ORDER — FUROSEMIDE 20 MG/1
20 TABLET ORAL DAILY
Qty: 90 TABLET | Refills: 3 | Status: SHIPPED | OUTPATIENT
Start: 2024-02-20 | End: 2024-04-02 | Stop reason: ALTCHOICE

## 2024-02-20 RX ORDER — CLOPIDOGREL BISULFATE 75 MG/1
75 TABLET ORAL DAILY
COMMUNITY
Start: 2024-02-05 | End: 2024-05-07 | Stop reason: SDUPTHER

## 2024-02-20 RX ORDER — METOLAZONE 2.5 MG/1
2.5 TABLET ORAL
COMMUNITY
End: 2024-02-20 | Stop reason: SDUPTHER

## 2024-02-20 RX ORDER — LOSARTAN POTASSIUM 25 MG/1
25 TABLET ORAL DAILY
Qty: 90 TABLET | Refills: 3 | Status: SHIPPED | OUTPATIENT
Start: 2024-02-20 | End: 2025-02-19

## 2024-02-20 ASSESSMENT — ENCOUNTER SYMPTOMS
CONSTITUTIONAL NEGATIVE: 1
RESPIRATORY NEGATIVE: 1
NEUROLOGICAL NEGATIVE: 1
CARDIOVASCULAR NEGATIVE: 1

## 2024-02-20 NOTE — PATIENT INSTRUCTIONS
4-6 weeks  Increase metolazone to mon wed and Friday  Decrease losartan to  25 mg one a day  Start furosemide 20mg one a day

## 2024-02-20 NOTE — PROGRESS NOTES
CARDIOLOGY OFFICE VISIT      CHIEF COMPLAINT  Chief Complaint   Patient presents with    Hospital Follow-up     TCM visit, follow up from [facility] due to hypotension        HISTORY OF PRESENT ILLNESS  Daphney Chen is a 64 y.o. year old female patient with a history of peripheral vascular disease and bifemoral bypass secondary to critical limb ischemia, carotid artery stenosis and subclavian stenosis, hypertension as well as a CAD s/p stenting to the RCA.  She was recently admitted with small symptomatic hypotension and increased dizziness and fatigue with her day-to-day activities.  The blood pressure was quite labile and she was eventually started on midodrine tomorrow to optimize her blood pressure as well as increase hydration.  She has been doing well since her discharge although she had some mixup with her medications.  Blood pressure continues to be fairly well-controlled at home.  However she has noticed increased lower extremity swelling since her Demadex was discontinued in the hospital.  She denies orthopnea proximal nocturnal dyspnea.    ASSESSMENT AND PLAN  1.  CAD with history of multivessel angioplasty and s/p RCA stent with recent cardiac catheterization that showed occluded left circumflex filling with collaterals, and 100% mid RCA that was stented in February 2023.  2.  Peripheral vascular disease s/p right axillary to bifemoral bypass as noted above, recommend follow-up with vascular surgery.  3.  Symptomatic hypertension: This is improved with midodrine but she still has relative borderline hypotension.  I will reduce losartan to 25 mg daily  4.  Recurrent lower extremity swelling: We will put her back on diuretics but try furosemide instead of Demadex at this time.  Also increase metolazone to 2.5 mg 3 days a week on Mondays Wednesdays and Fridays.  She will follow-up as scheduled for reevaluation.    Problem List Items Addressed This Visit       Type 2 diabetes mellitus, with long-term current  use of insulin (CMS/HCC)    Ascending aortic aneurysm (CMS/HCC)    CAD (coronary artery disease)    Relevant Medications    clopidogrel (Plavix) 75 mg tablet    Hypertension associated with type 2 diabetes mellitus (CMS/HCC)    Mixed diabetic hyperlipidemia associated with type 2 diabetes mellitus (CMS/HCC)    Acute on chronic diastolic (congestive) heart failure (CMS/HCC)    Relevant Medications    clopidogrel (Plavix) 75 mg tablet    BMI 35.0-35.9,adult    Former smoker       Recent Cardiovascular Testing:    Echo-  Stress-  Cath-  Carotid Ultrasound-    Past Medical History  No past medical history on file.    Social History  Social History     Tobacco Use    Smoking status: Former     Packs/day: 1.00     Years: 30.00     Additional pack years: 0.00     Total pack years: 30.00     Types: Cigarettes     Quit date: 03/2020     Years since quitting: 3.9    Smokeless tobacco: Never   Vaping Use    Vaping Use: Never used   Substance Use Topics    Alcohol use: Not Currently    Drug use: Never       Family History     Family History   Problem Relation Name Age of Onset    Diabetes Mother      Hypertension Mother      Heart attack Mother      Heart attack Daughter      Other (blood cancer) Daughter          Allergies:  Allergies   Allergen Reactions    Morphine Itching and Rash     Feels like bugs are crawling on her        Outpatient Medications:  Current Outpatient Medications   Medication Instructions    albuterol 90 mcg/actuation inhaler 2 puffs, inhalation, Every 4 hours    aspirin 81 mg chewable tablet 1 tablet, oral, Daily    atorvastatin (LIPITOR) 80 mg, oral, Nightly    clopidogrel (PLAVIX) 75 mg, oral, Daily    ergocalciferol (VITAMIN D-2) 50,000 Units, oral, Weekly    gabapentin (NEURONTIN) 400 mg, oral, 3 times daily    glipiZIDE (GLUCOTROL) 5 mg, oral, Daily before breakfast    levothyroxine (SYNTHROID, LEVOXYL) 137 mcg, oral, Daily    melatonin 10 mg tablet 1 tablet, oral, Nightly    metFORMIN (GLUCOPHAGE)  1,000 mg, oral, 2 times daily with meals    metOLazone (ZAROXOLYN) 2.5 mg, oral, 2x a week    metoprolol succinate XL (TOPROL-XL) 25 mg, oral, Daily    midodrine (PROAMATINE) 2.5 mg, oral, 2 times daily with meals    naloxone (NARCAN) 4 mg, nasal, As needed    nitroglycerin (NITROSTAT) 0.4 mg, sublingual, Every 5 min PRN    OneTouch Delica Plus Lancet 33 gauge misc Daily, AS DIRECTED    OneTouch Verio test strips strip Daily    oxyCODONE (ROXICODONE) 15 mg, oral, Every 6 hours PRN    potassium chloride CR 20 mEq ER tablet TAKE 1 TABLET(20 MEQ) BY MOUTH EVERY DAY. DO NOT CRUSH OR CHEW    Spiriva Respimat 2.5 mcg/actuation inhaler 2 puffs, inhalation, Daily        Recent Lab Results:    CBC:   Lab Results   Component Value Date    WBC 5.2 02/11/2024    RBC 4.16 02/11/2024    HGB 11.5 (L) 02/11/2024    HCT 38.1 02/11/2024     02/11/2024        CMP:    Lab Results   Component Value Date     02/13/2024    K 4.2 02/13/2024     02/13/2024    CO2 28 02/13/2024    BUN 11 02/13/2024    CREATININE 0.68 02/13/2024    GLUCOSE 182 (H) 02/13/2024    CALCIUM 8.7 02/13/2024       Magnesium:    Lab Results   Component Value Date    MG 1.67 02/07/2024       Lipid Profile:    Lab Results   Component Value Date    TRIG 85 11/30/2023    HDL 58.6 11/30/2023    LDLCALC 40 11/30/2023       TSH:    Lab Results   Component Value Date    TSH 19.24 (H) 11/30/2023       BNP:   Lab Results   Component Value Date    BNP 43 02/07/2024        PT/INR:    Lab Results   Component Value Date    PROTIME 11.9 02/07/2024    INR 1.1 02/07/2024       HgBA1c:    Lab Results   Component Value Date    HGBA1C 7.8 (H) 11/30/2023       BMP:  Lab Results   Component Value Date     02/13/2024     (L) 02/12/2024     02/09/2024    K 4.2 02/13/2024    K 4.3 02/12/2024    K 3.8 02/09/2024     02/13/2024     02/12/2024     02/09/2024    CO2 28 02/13/2024    CO2 27 02/12/2024    CO2 29 02/09/2024    BUN 11 02/13/2024     BUN 11 02/12/2024    BUN 18 02/09/2024    CREATININE 0.68 02/13/2024    CREATININE 0.67 02/12/2024    CREATININE 0.86 02/09/2024       CBC:  Lab Results   Component Value Date    WBC 5.2 02/11/2024    WBC 4.7 02/08/2024    WBC 6.0 02/07/2024    RBC 4.16 02/11/2024    RBC 4.40 02/08/2024    RBC 4.34 02/07/2024    HGB 11.5 (L) 02/11/2024    HGB 12.4 02/08/2024    HGB 12.3 02/07/2024    HCT 38.1 02/11/2024    HCT 40.3 02/08/2024    HCT 39.1 02/07/2024    MCV 92 02/11/2024    MCV 92 02/08/2024    MCV 90 02/07/2024    MCH 27.6 02/11/2024    MCH 28.2 02/08/2024    MCH 28.3 02/07/2024    MCHC 30.2 (L) 02/11/2024    MCHC 30.8 (L) 02/08/2024    MCHC 31.5 (L) 02/07/2024    RDW 15.6 (H) 02/11/2024    RDW 15.4 (H) 02/08/2024    RDW 15.4 (H) 02/07/2024     02/11/2024     02/08/2024     02/07/2024       Cardiac Enzymes:    Lab Results   Component Value Date    TROPHS 17 (H) 02/07/2024    TROPHS 17 (H) 02/07/2024    TROPHS 245 (H) 06/25/2023       Hepatic Function Panel:    Lab Results   Component Value Date    ALKPHOS 130 02/08/2024    ALT 38 02/08/2024    AST 43 (H) 02/08/2024    PROT 6.6 02/08/2024    BILITOT 0.6 02/08/2024    BILIDIR 0.9 (H) 06/15/2023         REVIEW OF SYSTEMS  Review of Systems   Constitutional: Negative.   Cardiovascular: Negative.    Respiratory: Negative.     Neurological: Negative.    All other systems reviewed and are negative.      VITALS  Vitals:    02/20/24 1405   BP: 98/60   Pulse: 76     Wt Readings from Last 4 Encounters:   02/20/24 105 kg (230 lb 12.8 oz)   02/16/24 104 kg (229 lb)   02/13/24 104 kg (230 lb 6.1 oz)   01/31/24 105 kg (232 lb)       PHYSICAL EXAM  Constitutional:       Appearance: Healthy appearance. Not in distress.   Eyes:      Conjunctiva/sclera: Conjunctivae normal.      Pupils: Pupils are equal, round, and reactive to light.   Neck:      Vascular: No JVR. JVD normal.   Pulmonary:      Effort: Pulmonary effort is normal.      Breath sounds: Normal breath  sounds. No wheezing. No rhonchi. No rales.   Chest:      Chest wall: Not tender to palpatation.   Cardiovascular:      PMI at left midclavicular line. Normal rate. Regular rhythm. Normal S1. Normal S2.       Murmurs:  2-3/6 systolic murmur LSB      No gallop.  No click. No rub.   Pulses:     Intact distal pulses.   Edema:     Peripheral edema present.     Ankle: bilateral 2+ edema of the ankle.  Abdominal:      Tenderness: There is no abdominal tenderness.   Musculoskeletal: Normal range of motion.         General: No tenderness.      Cervical back: Normal range of motion. Skin:     General: Skin is warm and dry.   Neurological:      General: No focal deficit present.      Mental Status: Alert and oriented to person, place and time.

## 2024-02-20 NOTE — PROGRESS NOTES
Patient FMLA for her daughter is filled out and signed by Dr Wellington . Will be scanned into chart and patient called.

## 2024-02-21 NOTE — TELEPHONE ENCOUNTER
Received request for prescription refills for patient.   Patient follows with Dr. Eliz MD     Last OV 2/20/2024  Next OV 4/2/2024    Pended for signing and sent to provider

## 2024-02-22 RX ORDER — METOLAZONE 2.5 MG/1
TABLET ORAL
Qty: 38 TABLET | Refills: 3 | Status: SHIPPED | OUTPATIENT
Start: 2024-02-22

## 2024-02-27 ENCOUNTER — APPOINTMENT (OUTPATIENT)
Dept: CARDIOLOGY | Facility: CLINIC | Age: 64
End: 2024-02-27
Payer: MEDICARE

## 2024-03-05 ENCOUNTER — APPOINTMENT (OUTPATIENT)
Dept: PRIMARY CARE | Facility: CLINIC | Age: 64
End: 2024-03-05
Payer: MEDICARE

## 2024-03-06 ENCOUNTER — PATIENT OUTREACH (OUTPATIENT)
Dept: PRIMARY CARE | Facility: CLINIC | Age: 64
End: 2024-03-06
Payer: MEDICARE

## 2024-03-06 NOTE — PROGRESS NOTES
Unable to reach patient for call back after patient's follow up appointment with cardio 2/20/24.   LVM with call back number for patient to call if needed to assist with any questions or concerns patient may have.

## 2024-03-13 ENCOUNTER — OFFICE VISIT (OUTPATIENT)
Dept: PRIMARY CARE | Facility: CLINIC | Age: 64
End: 2024-03-13
Payer: MEDICARE

## 2024-03-13 VITALS
OXYGEN SATURATION: 94 % | WEIGHT: 226 LBS | BODY MASS INDEX: 34.25 KG/M2 | SYSTOLIC BLOOD PRESSURE: 124 MMHG | HEIGHT: 68 IN | DIASTOLIC BLOOD PRESSURE: 72 MMHG | TEMPERATURE: 97.9 F | HEART RATE: 74 BPM

## 2024-03-13 DIAGNOSIS — I15.2 HYPERTENSION ASSOCIATED WITH TYPE 2 DIABETES MELLITUS (MULTI): ICD-10-CM

## 2024-03-13 DIAGNOSIS — E03.9 ACQUIRED HYPOTHYROIDISM: ICD-10-CM

## 2024-03-13 DIAGNOSIS — E11.69 MIXED DIABETIC HYPERLIPIDEMIA ASSOCIATED WITH TYPE 2 DIABETES MELLITUS (MULTI): ICD-10-CM

## 2024-03-13 DIAGNOSIS — E11.65 TYPE 2 DIABETES MELLITUS WITH HYPERGLYCEMIA, WITH LONG-TERM CURRENT USE OF INSULIN (MULTI): ICD-10-CM

## 2024-03-13 DIAGNOSIS — Z79.4 TYPE 2 DIABETES MELLITUS WITH HYPERGLYCEMIA, WITH LONG-TERM CURRENT USE OF INSULIN (MULTI): ICD-10-CM

## 2024-03-13 DIAGNOSIS — E11.59 HYPERTENSION ASSOCIATED WITH TYPE 2 DIABETES MELLITUS (MULTI): ICD-10-CM

## 2024-03-13 DIAGNOSIS — E78.2 MIXED DIABETIC HYPERLIPIDEMIA ASSOCIATED WITH TYPE 2 DIABETES MELLITUS (MULTI): ICD-10-CM

## 2024-03-13 DIAGNOSIS — I50.33 ACUTE ON CHRONIC DIASTOLIC (CONGESTIVE) HEART FAILURE (MULTI): Primary | ICD-10-CM

## 2024-03-13 PROCEDURE — 3074F SYST BP LT 130 MM HG: CPT | Performed by: INTERNAL MEDICINE

## 2024-03-13 PROCEDURE — 1159F MED LIST DOCD IN RCRD: CPT | Performed by: INTERNAL MEDICINE

## 2024-03-13 PROCEDURE — 3008F BODY MASS INDEX DOCD: CPT | Performed by: INTERNAL MEDICINE

## 2024-03-13 PROCEDURE — 1111F DSCHRG MED/CURRENT MED MERGE: CPT | Performed by: INTERNAL MEDICINE

## 2024-03-13 PROCEDURE — 4010F ACE/ARB THERAPY RXD/TAKEN: CPT | Performed by: INTERNAL MEDICINE

## 2024-03-13 PROCEDURE — 99213 OFFICE O/P EST LOW 20 MIN: CPT | Performed by: INTERNAL MEDICINE

## 2024-03-13 PROCEDURE — 3078F DIAST BP <80 MM HG: CPT | Performed by: INTERNAL MEDICINE

## 2024-03-13 PROCEDURE — 1036F TOBACCO NON-USER: CPT | Performed by: INTERNAL MEDICINE

## 2024-03-14 ASSESSMENT — ENCOUNTER SYMPTOMS
JOINT SWELLING: 0
SPEECH DIFFICULTY: 0
LIGHT-HEADEDNESS: 0
ACTIVITY CHANGE: 0
EYE REDNESS: 0
BLOOD IN STOOL: 0
PALPITATIONS: 0
CHEST TIGHTNESS: 0
STRIDOR: 0
FEVER: 0
HEMATURIA: 0

## 2024-04-02 ENCOUNTER — OFFICE VISIT (OUTPATIENT)
Dept: CARDIOLOGY | Facility: CLINIC | Age: 64
End: 2024-04-02
Payer: MEDICARE

## 2024-04-02 VITALS
DIASTOLIC BLOOD PRESSURE: 62 MMHG | SYSTOLIC BLOOD PRESSURE: 110 MMHG | WEIGHT: 226.6 LBS | HEIGHT: 68 IN | HEART RATE: 76 BPM | BODY MASS INDEX: 34.34 KG/M2

## 2024-04-02 DIAGNOSIS — Z87.891 FORMER SMOKER: ICD-10-CM

## 2024-04-02 DIAGNOSIS — I25.10 CORONARY ARTERY DISEASE INVOLVING NATIVE CORONARY ARTERY OF NATIVE HEART WITHOUT ANGINA PECTORIS: ICD-10-CM

## 2024-04-02 DIAGNOSIS — I71.43 INFRARENAL ABDOMINAL AORTIC ANEURYSM (AAA) WITHOUT RUPTURE (CMS-HCC): ICD-10-CM

## 2024-04-02 DIAGNOSIS — E78.2 MIXED DIABETIC HYPERLIPIDEMIA ASSOCIATED WITH TYPE 2 DIABETES MELLITUS (MULTI): ICD-10-CM

## 2024-04-02 DIAGNOSIS — I15.2 HYPERTENSION ASSOCIATED WITH TYPE 2 DIABETES MELLITUS (MULTI): ICD-10-CM

## 2024-04-02 DIAGNOSIS — I73.9 PVD (PERIPHERAL VASCULAR DISEASE) (CMS-HCC): ICD-10-CM

## 2024-04-02 DIAGNOSIS — R60.0 BILATERAL LEG EDEMA: ICD-10-CM

## 2024-04-02 DIAGNOSIS — I51.89 COMBINED SYSTOLIC AND DIASTOLIC CARDIAC DYSFUNCTION: ICD-10-CM

## 2024-04-02 DIAGNOSIS — E11.59 HYPERTENSION ASSOCIATED WITH TYPE 2 DIABETES MELLITUS (MULTI): ICD-10-CM

## 2024-04-02 DIAGNOSIS — E11.69 MIXED DIABETIC HYPERLIPIDEMIA ASSOCIATED WITH TYPE 2 DIABETES MELLITUS (MULTI): ICD-10-CM

## 2024-04-02 DIAGNOSIS — R00.2 PALPITATIONS: ICD-10-CM

## 2024-04-02 PROCEDURE — 3008F BODY MASS INDEX DOCD: CPT | Performed by: INTERNAL MEDICINE

## 2024-04-02 PROCEDURE — 1036F TOBACCO NON-USER: CPT | Performed by: INTERNAL MEDICINE

## 2024-04-02 PROCEDURE — 4010F ACE/ARB THERAPY RXD/TAKEN: CPT | Performed by: INTERNAL MEDICINE

## 2024-04-02 PROCEDURE — 99214 OFFICE O/P EST MOD 30 MIN: CPT | Performed by: INTERNAL MEDICINE

## 2024-04-02 PROCEDURE — 3078F DIAST BP <80 MM HG: CPT | Performed by: INTERNAL MEDICINE

## 2024-04-02 PROCEDURE — 1159F MED LIST DOCD IN RCRD: CPT | Performed by: INTERNAL MEDICINE

## 2024-04-02 PROCEDURE — 3074F SYST BP LT 130 MM HG: CPT | Performed by: INTERNAL MEDICINE

## 2024-04-02 RX ORDER — FUROSEMIDE 40 MG/1
40 TABLET ORAL DAILY
Qty: 90 TABLET | Refills: 3 | Status: SHIPPED | OUTPATIENT
Start: 2024-04-02 | End: 2025-04-02

## 2024-04-02 RX ORDER — FLUTICASONE FUROATE AND VILANTEROL 100; 25 UG/1; UG/1
POWDER RESPIRATORY (INHALATION)
COMMUNITY

## 2024-04-02 ASSESSMENT — ENCOUNTER SYMPTOMS
CARDIOVASCULAR NEGATIVE: 1
NEUROLOGICAL NEGATIVE: 1
CONSTITUTIONAL NEGATIVE: 1
RESPIRATORY NEGATIVE: 1

## 2024-04-02 NOTE — PROGRESS NOTES
CARDIOLOGY OFFICE VISIT      CHIEF COMPLAINT  Chief Complaint   Patient presents with    Follow-up     4-6 week follow up        HISTORY OF PRESENT ILLNESS  Daphney Chen is a 64 y.o. year old female patient with history of peripheral vascular disease and bifemoral bypass secondary to critical limb ischemia, carotid stenosis and subclavian artery stenosis, hypertension as well as CAD s/p stenting to the RCA in February 2023.  She had a recent episode of symptomatic hypotension and increased dizziness for which she was started on low-dose midodrine.  She has persistent lower extremity swelling following discontinuation of her Demadex during her hospitalization.  We had switched to oral Lasix in order to avoid excessive hypotension.  She still complains of increased lower extremity swelling but the pain in the legs is much improved.    ASSESSMENT AND PLAN  1.  CAD with a history of multivessel angioplasty and s/p RCA stent and recent cardiac catheterization that showed occluded left circumflex filling with collaterals and 100% mid RCA was stented in February 2023  2.  Peripheral vascular disease s/p right axillary to bifemoral bypass, recommend follow-up with vascular surgery.  3.  Persistent lower extremity swelling: I will increase her Lasix to 40 mg daily and continue with metolazone 2.5 mg 3 days a week.  Will get a repeat BMP when she returns for follow-up.  4.  Irregular heart rhythm: She appears to have frequent ectopics and examination today, we will get 48-hour Holter monitor to rule out any episodes of silent A-fib.    Problem List Items Addressed This Visit       PVD (peripheral vascular disease) (CMS/HCC)    Combined systolic and diastolic cardiac dysfunction    Bilateral leg edema    CAD (coronary artery disease)    Hypertension associated with type 2 diabetes mellitus (CMS/HCC)    Infrarenal abdominal aortic aneurysm (AAA) without rupture (CMS/HCC)    Mixed diabetic hyperlipidemia associated with type  2 diabetes mellitus (CMS/[facility])    BMI 34.0-34.9,adult    Former smoker     Other Visit Diagnoses       Palpitations                Recent Cardiovascular Testing:    Echo-  Stress-  Cath-  Carotid Ultrasound-    Past Medical History  No past medical history on file.    Social History  Social History     Tobacco Use    Smoking status: Former     Packs/day: 1.00     Years: 30.00     Additional pack years: 0.00     Total pack years: 30.00     Types: Cigarettes     Quit date: 2020     Years since quittin.0    Smokeless tobacco: Never   Vaping Use    Vaping Use: Never used   Substance Use Topics    Alcohol use: Not Currently    Drug use: Never       Family History     Family History   Problem Relation Name Age of Onset    Diabetes Mother      Hypertension Mother      Heart attack Mother      Heart attack Daughter      Other (blood cancer) Daughter          Allergies:  Allergies   Allergen Reactions    Morphine Itching and Rash     Feels like bugs are crawling on her        Outpatient Medications:  Current Outpatient Medications   Medication Instructions    albuterol 90 mcg/actuation inhaler 2 puffs, inhalation, Every 4 hours    aspirin 81 mg chewable tablet 1 tablet, oral, Daily    atorvastatin (LIPITOR) 80 mg, oral, Nightly    clopidogrel (PLAVIX) 75 mg, oral, Daily    ergocalciferol (VITAMIN D-2) 50,000 Units, oral, Weekly    fluticasone furoate-vilanteroL (Breo Ellipta) 100-25 mcg/dose inhaler inhalation, As directed PRN    gabapentin (NEURONTIN) 400 mg, oral, 3 times daily    glipiZIDE (GLUCOTROL) 5 mg, oral, Daily before breakfast    levothyroxine (SYNTHROID, LEVOXYL) 137 mcg, oral, Daily    losartan (COZAAR) 25 mg, oral, Daily    melatonin 10 mg tablet 1 tablet, oral, Nightly, PRN    metFORMIN (GLUCOPHAGE) 1,000 mg, oral, 2 times daily with meals    metOLazone (Zaroxolyn) 2.5 mg tablet TAKE 1 TABLET BY MOUTH ON MONDAY, WEDNESDAY, FRIDAY    metoprolol succinate XL (TOPROL-XL) 25 mg, oral, Daily    midodrine  (PROAMATINE) 2.5 mg, oral, 2 times daily with meals    naloxone (NARCAN) 4 mg, nasal, As needed    nitroglycerin (NITROSTAT) 0.4 mg, sublingual, Every 5 min PRN    OneTouch Delica Plus Lancet 33 gauge misc Daily, AS DIRECTED    OneTouch Verio test strips strip Daily    oxyCODONE (ROXICODONE) 15 mg, oral, Every 6 hours PRN    potassium chloride CR 20 mEq ER tablet TAKE 1 TABLET(20 MEQ) BY MOUTH EVERY DAY. DO NOT CRUSH OR CHEW    Spiriva Respimat 2.5 mcg/actuation inhaler 2 puffs, inhalation, Daily        Recent Lab Results:    CBC:   Lab Results   Component Value Date    WBC 5.2 02/11/2024    RBC 4.16 02/11/2024    HGB 11.5 (L) 02/11/2024    HCT 38.1 02/11/2024     02/11/2024        CMP:    Lab Results   Component Value Date     02/13/2024    K 4.2 02/13/2024     02/13/2024    CO2 28 02/13/2024    BUN 11 02/13/2024    CREATININE 0.68 02/13/2024    GLUCOSE 182 (H) 02/13/2024    CALCIUM 8.7 02/13/2024       Magnesium:    Lab Results   Component Value Date    MG 1.67 02/07/2024       Lipid Profile:    Lab Results   Component Value Date    TRIG 85 11/30/2023    HDL 58.6 11/30/2023    LDLCALC 40 11/30/2023       TSH:    Lab Results   Component Value Date    TSH 19.24 (H) 11/30/2023       BNP:   Lab Results   Component Value Date    BNP 43 02/07/2024        PT/INR:    Lab Results   Component Value Date    PROTIME 11.9 02/07/2024    INR 1.1 02/07/2024       HgBA1c:    Lab Results   Component Value Date    HGBA1C 7.8 (H) 11/30/2023       BMP:  Lab Results   Component Value Date     02/13/2024     (L) 02/12/2024     02/09/2024    K 4.2 02/13/2024    K 4.3 02/12/2024    K 3.8 02/09/2024     02/13/2024     02/12/2024     02/09/2024    CO2 28 02/13/2024    CO2 27 02/12/2024    CO2 29 02/09/2024    BUN 11 02/13/2024    BUN 11 02/12/2024    BUN 18 02/09/2024    CREATININE 0.68 02/13/2024    CREATININE 0.67 02/12/2024    CREATININE 0.86 02/09/2024       CBC:  Lab Results    Component Value Date    WBC 5.2 02/11/2024    WBC 4.7 02/08/2024    WBC 6.0 02/07/2024    RBC 4.16 02/11/2024    RBC 4.40 02/08/2024    RBC 4.34 02/07/2024    HGB 11.5 (L) 02/11/2024    HGB 12.4 02/08/2024    HGB 12.3 02/07/2024    HCT 38.1 02/11/2024    HCT 40.3 02/08/2024    HCT 39.1 02/07/2024    MCV 92 02/11/2024    MCV 92 02/08/2024    MCV 90 02/07/2024    MCH 27.6 02/11/2024    MCH 28.2 02/08/2024    MCH 28.3 02/07/2024    MCHC 30.2 (L) 02/11/2024    MCHC 30.8 (L) 02/08/2024    MCHC 31.5 (L) 02/07/2024    RDW 15.6 (H) 02/11/2024    RDW 15.4 (H) 02/08/2024    RDW 15.4 (H) 02/07/2024     02/11/2024     02/08/2024     02/07/2024       Cardiac Enzymes:    Lab Results   Component Value Date    TROPHS 17 (H) 02/07/2024    TROPHS 17 (H) 02/07/2024    TROPHS 245 (H) 06/25/2023       Hepatic Function Panel:    Lab Results   Component Value Date    ALKPHOS 130 02/08/2024    ALT 38 02/08/2024    AST 43 (H) 02/08/2024    PROT 6.6 02/08/2024    BILITOT 0.6 02/08/2024    BILIDIR 0.9 (H) 06/15/2023         REVIEW OF SYSTEMS  Review of Systems   Constitutional: Negative.   Cardiovascular: Negative.    Respiratory: Negative.     Neurological: Negative.    All other systems reviewed and are negative.      VITALS  Vitals:    04/02/24 1112   BP: 110/62   Pulse: 76     Wt Readings from Last 4 Encounters:   04/02/24 103 kg (226 lb 9.6 oz)   03/13/24 103 kg (226 lb)   02/20/24 105 kg (230 lb 12.8 oz)   02/16/24 104 kg (229 lb)       PHYSICAL EXAM  Constitutional:       Appearance: Healthy appearance. Not in distress.   Eyes:      Conjunctiva/sclera: Conjunctivae normal.      Pupils: Pupils are equal, round, and reactive to light.   Neck:      Vascular: No JVR. JVD normal.   Pulmonary:      Effort: Pulmonary effort is normal.      Breath sounds: Normal breath sounds. No wheezing. No rhonchi. No rales.   Chest:      Chest wall: Not tender to palpatation.   Cardiovascular:      PMI at left midclavicular line.  Normal rate. Regular rhythm. Normal S1. Normal S2.       Murmurs:  3/6 Systolic murmur RUSB      No gallop.  No click. No rub.   Pulses:     Intact distal pulses.   Edema:     Peripheral edema present.     Ankle: bilateral 2+ edema of the ankle.  Abdominal:      Tenderness: There is no abdominal tenderness.   Musculoskeletal: Normal range of motion.         General: No tenderness.      Cervical back: Normal range of motion. Skin:     General: Skin is warm and dry.   Neurological:      General: No focal deficit present.      Mental Status: Alert and oriented to person, place and time.

## 2024-04-03 ENCOUNTER — PATIENT OUTREACH (OUTPATIENT)
Dept: PRIMARY CARE | Facility: CLINIC | Age: 64
End: 2024-04-03
Payer: MEDICARE

## 2024-04-04 ENCOUNTER — APPOINTMENT (OUTPATIENT)
Dept: VASCULAR SURGERY | Facility: CLINIC | Age: 64
End: 2024-04-04
Payer: MEDICARE

## 2024-04-18 ENCOUNTER — HOSPITAL ENCOUNTER (OUTPATIENT)
Dept: RADIOLOGY | Facility: HOSPITAL | Age: 64
Discharge: HOME | End: 2024-04-18
Payer: MEDICARE

## 2024-04-18 ENCOUNTER — OFFICE VISIT (OUTPATIENT)
Dept: VASCULAR SURGERY | Facility: CLINIC | Age: 64
End: 2024-04-18
Payer: MEDICARE

## 2024-04-18 VITALS
TEMPERATURE: 97.1 F | DIASTOLIC BLOOD PRESSURE: 65 MMHG | SYSTOLIC BLOOD PRESSURE: 91 MMHG | WEIGHT: 233 LBS | BODY MASS INDEX: 35.31 KG/M2 | RESPIRATION RATE: 16 BRPM | OXYGEN SATURATION: 91 % | HEIGHT: 68 IN | HEART RATE: 89 BPM

## 2024-04-18 DIAGNOSIS — I73.9 PAD (PERIPHERAL ARTERY DISEASE) (CMS-HCC): ICD-10-CM

## 2024-04-18 DIAGNOSIS — I70.391 OTHER ATHEROSCLEROSIS OF UNSPECIFIED TYPE OF BYPASS GRAFT(S) OF THE EXTREMITIES, RIGHT LEG (CMS-HCC): ICD-10-CM

## 2024-04-18 DIAGNOSIS — I70.392 OTHER ATHEROSCLEROSIS OF UNSPECIFIED TYPE OF BYPASS GRAFT(S) OF THE EXTREMITIES, LEFT LEG (CMS-HCC): ICD-10-CM

## 2024-04-18 DIAGNOSIS — I71.40 ABDOMINAL AORTIC ANEURYSM (AAA) WITHOUT RUPTURE, UNSPECIFIED PART (CMS-HCC): ICD-10-CM

## 2024-04-18 DIAGNOSIS — Z09 FOLLOW-UP EXAM: ICD-10-CM

## 2024-04-18 DIAGNOSIS — I73.9 PAD (PERIPHERAL ARTERY DISEASE) (CMS-HCC): Primary | ICD-10-CM

## 2024-04-18 DIAGNOSIS — R09.89 OTHER SPECIFIED SYMPTOMS AND SIGNS INVOLVING THE CIRCULATORY AND RESPIRATORY SYSTEMS: ICD-10-CM

## 2024-04-18 PROCEDURE — 3074F SYST BP LT 130 MM HG: CPT | Performed by: SURGERY

## 2024-04-18 PROCEDURE — 4010F ACE/ARB THERAPY RXD/TAKEN: CPT | Performed by: SURGERY

## 2024-04-18 PROCEDURE — 99214 OFFICE O/P EST MOD 30 MIN: CPT | Performed by: SURGERY

## 2024-04-18 PROCEDURE — 93922 UPR/L XTREMITY ART 2 LEVELS: CPT

## 2024-04-18 PROCEDURE — 3078F DIAST BP <80 MM HG: CPT | Performed by: SURGERY

## 2024-04-18 PROCEDURE — 93930 UPPER EXTREMITY STUDY: CPT

## 2024-04-18 PROCEDURE — 3008F BODY MASS INDEX DOCD: CPT | Performed by: SURGERY

## 2024-04-18 PROCEDURE — 1159F MED LIST DOCD IN RCRD: CPT | Performed by: SURGERY

## 2024-04-18 NOTE — PROGRESS NOTES
Vascular Surgery Clinic Note    CC: PAD    HPI:  Daphney Chen is 64 y.o. female with history of aortic occlusion s/p ax-bifemoral bypass 6/13/23 for severe rest pain. She is doing well. Leg swelling has improved. Duplex and ZOE show widely patent bypass.     Medical History:   has no past medical history on file.    Meds:   Current Outpatient Medications on File Prior to Visit   Medication Sig Dispense Refill    albuterol 90 mcg/actuation inhaler Inhale 2 puffs every 4 hours.      aspirin 81 mg chewable tablet Chew 1 tablet (81 mg) once daily.      atorvastatin (Lipitor) 80 mg tablet Take 1 tablet (80 mg) by mouth once daily at bedtime.      clopidogrel (Plavix) 75 mg tablet Take 1 tablet (75 mg) by mouth once daily.      ergocalciferol (Vitamin D-2) 1.25 MG (27972 UT) capsule Take 1 capsule (50,000 Units) by mouth 1 (one) time per week. 12 capsule 3    fluticasone furoate-vilanteroL (Breo Ellipta) 100-25 mcg/dose inhaler Inhale. As directed PRN      furosemide (Lasix) 40 mg tablet Take 1 tablet (40 mg) by mouth once daily. 90 tablet 3    gabapentin (Neurontin) 400 mg capsule Take 1 capsule (400 mg) by mouth 3 times a day.      glipiZIDE (Glucotrol) 5 mg tablet Take 1 tablet (5 mg) by mouth once daily in the morning. Take before meals. Do not start before February 14, 2024. 30 tablet 1    levothyroxine (Synthroid, Levoxyl) 137 mcg tablet Take 1 tablet (137 mcg) by mouth once daily. 90 tablet 1    losartan (Cozaar) 25 mg tablet Take 1 tablet (25 mg) by mouth once daily. 90 tablet 3    melatonin 10 mg tablet Take 1 tablet (10 mg) by mouth once daily at bedtime. PRN      metFORMIN (Glucophage) 1,000 mg tablet Take 1 tablet (1,000 mg) by mouth 2 times a day with meals. 180 tablet 3    metOLazone (Zaroxolyn) 2.5 mg tablet TAKE 1 TABLET BY MOUTH ON MONDAY, WEDNESDAY, FRIDAY 38 tablet 3    metoprolol succinate XL (Toprol-XL) 25 mg 24 hr tablet Take 1 tablet (25 mg) by mouth once daily.      midodrine (Proamatine) 2.5  mg tablet Take 1 tablet (2.5 mg) by mouth 2 times a day with meals. 60 tablet 1    naloxone (Narcan) 4 mg/0.1 mL nasal spray Administer 1 spray (4 mg) into affected nostril(s) if needed for opioid reversal.      nitroglycerin (Nitrostat) 0.4 mg SL tablet Place 1 tablet (0.4 mg) under the tongue every 5 minutes if needed for chest pain.      OneTouch Delica Plus Lancet 33 gauge misc once daily. AS DIRECTED      OneTouch Verio test strips strip once daily.      oxyCODONE (Roxicodone) 15 mg immediate release tablet Take 1 tablet (15 mg) by mouth every 6 hours if needed.      potassium chloride CR 20 mEq ER tablet TAKE 1 TABLET(20 MEQ) BY MOUTH EVERY DAY. DO NOT CRUSH OR CHEW 90 tablet 3    Spiriva Respimat 2.5 mcg/actuation inhaler Inhale 2 puffs once daily.       No current facility-administered medications on file prior to visit.        Allergies:   Allergies   Allergen Reactions    Morphine Itching and Rash     Feels like bugs are crawling on her       SH:    Social Determinants of Health     Tobacco Use: Medium Risk (4/18/2024)    Patient History     Smoking Tobacco Use: Former     Smokeless Tobacco Use: Never     Passive Exposure: Not on file   Alcohol Use: Not At Risk (2/7/2024)    AUDIT-C     Frequency of Alcohol Consumption: Never     Average Number of Drinks: Patient does not drink     Frequency of Binge Drinking: Never   Financial Resource Strain: Not on file   Food Insecurity: Not on file   Transportation Needs: Not on file   Physical Activity: Not on file   Stress: Not on file   Social Connections: Not on file   Intimate Partner Violence: Not on file   Depression: Not at risk (2/7/2024)    PHQ-2     PHQ-2 Score: 0   Housing Stability: Not on file   Utilities: Not on file   Digital Equity: Not on file   Health Literacy: Not on file        FH:  Family History   Problem Relation Name Age of Onset    Diabetes Mother      Hypertension Mother      Heart attack Mother      Heart attack Daughter      Other (blood  cancer) Daughter          ROS:  All systems were reviewed and are negative except as per HPI.    Objective:  Vitals:  Vitals:    04/18/24 1259   BP: 91/65   Pulse: 89   Resp: 16   Temp: 36.2 °C (97.1 °F)   SpO2: 91%        Exam:  In NAD, well appearing  Abd Soft, ND/NT  Vascular examination:  No pedal pulses possibly due to persistent edema. Feet are warm.      Assessment & Plan:  Daphney Chen is 64 y.o. female s/p ax-bifem. Widely patent on duplex. Recent CTA chest did show the proximal anastomosis which appears widely patent. Continue DAPT. OAC was stopped due to hemoptysis.      I spent a total of 30 minutes on the day of the visit.         Tom Rosas M.D.

## 2024-04-19 PROCEDURE — 93922 UPR/L XTREMITY ART 2 LEVELS: CPT | Performed by: INTERNAL MEDICINE

## 2024-04-22 ENCOUNTER — ANCILLARY PROCEDURE (OUTPATIENT)
Dept: CARDIOLOGY | Facility: CLINIC | Age: 64
End: 2024-04-22
Payer: MEDICARE

## 2024-04-22 DIAGNOSIS — R00.2 PALPITATIONS: ICD-10-CM

## 2024-04-22 PROCEDURE — 93227 XTRNL ECG REC<48 HR R&I: CPT | Performed by: INTERNAL MEDICINE

## 2024-04-22 PROCEDURE — 93225 XTRNL ECG REC<48 HRS REC: CPT | Performed by: INTERNAL MEDICINE

## 2024-04-25 DIAGNOSIS — E11.9 TYPE 2 DIABETES MELLITUS WITHOUT COMPLICATION, WITH LONG-TERM CURRENT USE OF INSULIN (MULTI): ICD-10-CM

## 2024-04-25 DIAGNOSIS — Z79.4 TYPE 2 DIABETES MELLITUS WITHOUT COMPLICATION, WITH LONG-TERM CURRENT USE OF INSULIN (MULTI): ICD-10-CM

## 2024-04-25 RX ORDER — GLIPIZIDE 5 MG/1
TABLET ORAL
Qty: 90 TABLET | Refills: 1 | Status: SHIPPED | OUTPATIENT
Start: 2024-04-25

## 2024-04-30 PROCEDURE — 93930 UPPER EXTREMITY STUDY: CPT | Performed by: INTERNAL MEDICINE

## 2024-05-05 DIAGNOSIS — E78.2 MIXED HYPERLIPIDEMIA: ICD-10-CM

## 2024-05-05 DIAGNOSIS — E11.59 HYPERTENSION ASSOCIATED WITH TYPE 2 DIABETES MELLITUS (MULTI): ICD-10-CM

## 2024-05-05 DIAGNOSIS — I15.2 HYPERTENSION ASSOCIATED WITH TYPE 2 DIABETES MELLITUS (MULTI): ICD-10-CM

## 2024-05-06 DIAGNOSIS — I25.10 CORONARY ARTERY DISEASE INVOLVING NATIVE CORONARY ARTERY OF NATIVE HEART WITHOUT ANGINA PECTORIS: ICD-10-CM

## 2024-05-07 RX ORDER — ATORVASTATIN CALCIUM 80 MG/1
80 TABLET, FILM COATED ORAL NIGHTLY
Qty: 90 TABLET | Refills: 3 | Status: SHIPPED | OUTPATIENT
Start: 2024-05-07

## 2024-05-07 RX ORDER — LOSARTAN POTASSIUM 25 MG/1
25 TABLET ORAL DAILY
Qty: 90 TABLET | Refills: 3 | Status: SHIPPED | OUTPATIENT
Start: 2024-05-07 | End: 2025-05-07

## 2024-05-07 RX ORDER — CLOPIDOGREL BISULFATE 75 MG/1
75 TABLET ORAL DAILY
Qty: 90 TABLET | Refills: 1 | Status: SHIPPED | OUTPATIENT
Start: 2024-05-07

## 2024-05-21 ENCOUNTER — TELEPHONE (OUTPATIENT)
Dept: CARDIOLOGY | Facility: CLINIC | Age: 64
End: 2024-05-21
Payer: MEDICARE

## 2024-05-21 NOTE — TELEPHONE ENCOUNTER
----- Message from Ronald Wellington MD sent at 5/19/2024  3:26 PM EDT -----  Essentially normal Holter

## 2024-05-21 NOTE — TELEPHONE ENCOUNTER
Patient seen with Dr. Wellington recently and noted to have questionable ectopic beats on exam.   Holter ordered to rule out silent Afib.     Patient has follow up on 6/11/24 with Dr. Wellington.   Will leave as scheduled as Holter is normal.

## 2024-06-11 ENCOUNTER — APPOINTMENT (OUTPATIENT)
Dept: CARDIOLOGY | Facility: CLINIC | Age: 64
End: 2024-06-11
Payer: MEDICARE

## 2024-08-16 ENCOUNTER — APPOINTMENT (OUTPATIENT)
Dept: SURGERY | Facility: HOSPITAL | Age: 64
End: 2024-08-16
Payer: MEDICARE

## 2024-08-16 ENCOUNTER — APPOINTMENT (OUTPATIENT)
Dept: RADIOLOGY | Facility: HOSPITAL | Age: 64
End: 2024-08-16
Payer: MEDICARE

## 2024-09-18 ENCOUNTER — APPOINTMENT (OUTPATIENT)
Dept: PRIMARY CARE | Facility: CLINIC | Age: 64
End: 2024-09-18
Payer: MEDICARE

## 2024-09-18 ENCOUNTER — TELEPHONE (OUTPATIENT)
Dept: PRIMARY CARE | Facility: CLINIC | Age: 64
End: 2024-09-18

## 2024-09-18 VITALS
TEMPERATURE: 97.7 F | OXYGEN SATURATION: 97 % | DIASTOLIC BLOOD PRESSURE: 60 MMHG | WEIGHT: 203 LBS | SYSTOLIC BLOOD PRESSURE: 100 MMHG | BODY MASS INDEX: 30.87 KG/M2 | HEART RATE: 95 BPM

## 2024-09-18 DIAGNOSIS — E55.9 VITAMIN D DEFICIENCY: ICD-10-CM

## 2024-09-18 DIAGNOSIS — E78.2 MIXED HYPERLIPIDEMIA: ICD-10-CM

## 2024-09-18 DIAGNOSIS — J96.01 ACUTE RESPIRATORY FAILURE WITH HYPOXIA (MULTI): ICD-10-CM

## 2024-09-18 DIAGNOSIS — I25.10 CORONARY ARTERY DISEASE INVOLVING NATIVE CORONARY ARTERY OF NATIVE HEART WITHOUT ANGINA PECTORIS: ICD-10-CM

## 2024-09-18 DIAGNOSIS — Z79.4 TYPE 2 DIABETES MELLITUS WITH HYPERGLYCEMIA, WITH LONG-TERM CURRENT USE OF INSULIN: ICD-10-CM

## 2024-09-18 DIAGNOSIS — F32.1 MAJOR DEPRESSIVE DISORDER, SINGLE EPISODE, MODERATE (MULTI): ICD-10-CM

## 2024-09-18 DIAGNOSIS — Z00.00 ENCOUNTER FOR SUBSEQUENT ANNUAL WELLNESS VISIT (AWV) IN MEDICARE PATIENT: Primary | ICD-10-CM

## 2024-09-18 DIAGNOSIS — E03.9 HYPOTHYROIDISM, UNSPECIFIED TYPE: ICD-10-CM

## 2024-09-18 DIAGNOSIS — E11.65 TYPE 2 DIABETES MELLITUS WITH HYPERGLYCEMIA, WITH LONG-TERM CURRENT USE OF INSULIN: ICD-10-CM

## 2024-09-18 PROBLEM — R60.0 BILATERAL LEG EDEMA: Status: RESOLVED | Noted: 2023-08-22 | Resolved: 2024-09-18

## 2024-09-18 LAB — POC HEMOGLOBIN A1C: 12 % (ref 4.2–6.5)

## 2024-09-18 PROCEDURE — G2211 COMPLEX E/M VISIT ADD ON: HCPCS | Performed by: INTERNAL MEDICINE

## 2024-09-18 PROCEDURE — G0439 PPPS, SUBSEQ VISIT: HCPCS | Performed by: INTERNAL MEDICINE

## 2024-09-18 PROCEDURE — G0444 DEPRESSION SCREEN ANNUAL: HCPCS | Performed by: INTERNAL MEDICINE

## 2024-09-18 PROCEDURE — 1036F TOBACCO NON-USER: CPT | Performed by: INTERNAL MEDICINE

## 2024-09-18 PROCEDURE — 3074F SYST BP LT 130 MM HG: CPT | Performed by: INTERNAL MEDICINE

## 2024-09-18 PROCEDURE — 1124F ACP DISCUSS-NO DSCNMKR DOCD: CPT | Performed by: INTERNAL MEDICINE

## 2024-09-18 PROCEDURE — 1160F RVW MEDS BY RX/DR IN RCRD: CPT | Performed by: INTERNAL MEDICINE

## 2024-09-18 PROCEDURE — 83036 HEMOGLOBIN GLYCOSYLATED A1C: CPT | Performed by: INTERNAL MEDICINE

## 2024-09-18 PROCEDURE — 99214 OFFICE O/P EST MOD 30 MIN: CPT | Performed by: INTERNAL MEDICINE

## 2024-09-18 PROCEDURE — 1159F MED LIST DOCD IN RCRD: CPT | Performed by: INTERNAL MEDICINE

## 2024-09-18 PROCEDURE — 3078F DIAST BP <80 MM HG: CPT | Performed by: INTERNAL MEDICINE

## 2024-09-18 PROCEDURE — 1170F FXNL STATUS ASSESSED: CPT | Performed by: INTERNAL MEDICINE

## 2024-09-18 RX ORDER — TIOTROPIUM BROMIDE INHALATION SPRAY 3.12 UG/1
2 SPRAY, METERED RESPIRATORY (INHALATION) DAILY
Qty: 8 G | Refills: 1 | Status: CANCELLED | OUTPATIENT
Start: 2024-09-18

## 2024-09-18 RX ORDER — ERGOCALCIFEROL 1.25 MG/1
50000 CAPSULE ORAL
Qty: 12 CAPSULE | Refills: 3 | Status: SHIPPED | OUTPATIENT
Start: 2024-09-22 | End: 2025-09-22

## 2024-09-18 RX ORDER — FLUTICASONE FUROATE AND VILANTEROL 100; 25 UG/1; UG/1
1 POWDER RESPIRATORY (INHALATION) DAILY
Qty: 60 EACH | Refills: 1 | Status: CANCELLED | OUTPATIENT
Start: 2024-09-18

## 2024-09-18 RX ORDER — ATORVASTATIN CALCIUM 80 MG/1
80 TABLET, FILM COATED ORAL NIGHTLY
Qty: 90 TABLET | Refills: 3 | Status: SHIPPED | OUTPATIENT
Start: 2024-09-18

## 2024-09-18 RX ORDER — ALBUTEROL SULFATE 90 UG/1
2 INHALANT RESPIRATORY (INHALATION) EVERY 4 HOURS PRN
Qty: 18 G | Refills: 11 | Status: SHIPPED | OUTPATIENT
Start: 2024-09-18

## 2024-09-18 RX ORDER — CLOPIDOGREL BISULFATE 75 MG/1
75 TABLET ORAL DAILY
Qty: 90 TABLET | Refills: 1 | Status: SHIPPED | OUTPATIENT
Start: 2024-09-18

## 2024-09-18 RX ORDER — DAPAGLIFLOZIN 10 MG/1
10 TABLET, FILM COATED ORAL DAILY
Qty: 90 TABLET | Refills: 1 | Status: SHIPPED | OUTPATIENT
Start: 2024-09-18 | End: 2025-09-18

## 2024-09-18 RX ORDER — LEVOTHYROXINE SODIUM 137 UG/1
137 TABLET ORAL DAILY
Qty: 90 TABLET | Refills: 1 | Status: SHIPPED | OUTPATIENT
Start: 2024-09-18 | End: 2025-09-18

## 2024-09-18 ASSESSMENT — COLUMBIA-SUICIDE SEVERITY RATING SCALE - C-SSRS
6. HAVE YOU EVER DONE ANYTHING, STARTED TO DO ANYTHING, OR PREPARED TO DO ANYTHING TO END YOUR LIFE?: NO
1. IN THE PAST MONTH, HAVE YOU WISHED YOU WERE DEAD OR WISHED YOU COULD GO TO SLEEP AND NOT WAKE UP?: YES
2. HAVE YOU ACTUALLY HAD ANY THOUGHTS OF KILLING YOURSELF?: NO

## 2024-09-18 ASSESSMENT — PATIENT HEALTH QUESTIONNAIRE - PHQ9
6. FEELING BAD ABOUT YOURSELF - OR THAT YOU ARE A FAILURE OR HAVE LET YOURSELF OR YOUR FAMILY DOWN: SEVERAL DAYS
2. FEELING DOWN, DEPRESSED OR HOPELESS: MORE THAN HALF THE DAYS
SUM OF ALL RESPONSES TO PHQ QUESTIONS 1-9: 11
10. IF YOU CHECKED OFF ANY PROBLEMS, HOW DIFFICULT HAVE THESE PROBLEMS MADE IT FOR YOU TO DO YOUR WORK, TAKE CARE OF THINGS AT HOME, OR GET ALONG WITH OTHER PEOPLE: SOMEWHAT DIFFICULT
3. TROUBLE FALLING OR STAYING ASLEEP OR SLEEPING TOO MUCH: NEARLY EVERY DAY
8. MOVING OR SPEAKING SO SLOWLY THAT OTHER PEOPLE COULD HAVE NOTICED. OR THE OPPOSITE, BEING SO FIGETY OR RESTLESS THAT YOU HAVE BEEN MOVING AROUND A LOT MORE THAN USUAL: NOT AT ALL
9. THOUGHTS THAT YOU WOULD BE BETTER OFF DEAD, OR OF HURTING YOURSELF: SEVERAL DAYS
SUM OF ALL RESPONSES TO PHQ9 QUESTIONS 1 AND 2: 4
5. POOR APPETITE OR OVEREATING: NOT AT ALL
1. LITTLE INTEREST OR PLEASURE IN DOING THINGS: MORE THAN HALF THE DAYS
7. TROUBLE CONCENTRATING ON THINGS, SUCH AS READING THE NEWSPAPER OR WATCHING TELEVISION: SEVERAL DAYS
4. FEELING TIRED OR HAVING LITTLE ENERGY: SEVERAL DAYS

## 2024-09-18 ASSESSMENT — ACTIVITIES OF DAILY LIVING (ADL)
TAKING_MEDICATION: TOTAL CARE
DRESSING: INDEPENDENT
DOING_HOUSEWORK: NEEDS ASSISTANCE
MANAGING_FINANCES: NEEDS ASSISTANCE
GROCERY_SHOPPING: NEEDS ASSISTANCE
BATHING: NEEDS ASSISTANCE

## 2024-09-18 ASSESSMENT — ENCOUNTER SYMPTOMS
FEVER: 0
BLOOD IN STOOL: 0
CHEST TIGHTNESS: 0
SPEECH DIFFICULTY: 0
STRIDOR: 0
ACTIVITY CHANGE: 0
EYE REDNESS: 0
PALPITATIONS: 0
JOINT SWELLING: 0
LIGHT-HEADEDNESS: 0
HEMATURIA: 0

## 2024-09-18 NOTE — PROGRESS NOTES
CHIEF COMPLAINT:   Annual Wellness Checkup       HISTORY OF PRESENT ILLNESS:   Daphney Chen comes for annual medical check up.  Otherwise doing well. No acute medical complaint today. Chronic medical conditions are stable with current medical management. Cognitive function is assessed. Immunizations are age appropriate. Home medications have been reconciled. The healthy lifestyle has been reinforced. Encouraged continued avoidance of tobacco, alcohol, poly pharmacy and substances. Functional capacity has been assessed. Discussed about fall risk and safety measures, at home and outside.  Age appropriate cancer screening tests were recommended. Reminded about code status and health care Power of  (POA). Discussed about mental health and wellbeing after reviewing depression screening questionnaire  (PHQ 9) with the patient for 5 mins. Vital signs, recent lab results, imaging studies were reviewed. Patient wanted to discuss about respiratory distress from asthma, COPD, peripheral vascular disease, atherosclerotic disease, poorly controlled diabetes and major depression. So a complete physical exams was performed.     Review of Systems   Constitutional:  Negative for activity change and fever.   HENT:  Negative for hearing loss, nosebleeds and tinnitus.    Eyes:  Negative for redness.   Respiratory:  Negative for chest tightness and stridor.    Cardiovascular:  Negative for chest pain, palpitations and leg swelling.   Gastrointestinal:  Negative for blood in stool.   Endocrine: Negative for cold intolerance.   Genitourinary:  Negative for hematuria.   Musculoskeletal:  Negative for joint swelling.   Skin:  Negative for rash.   Neurological:  Negative for speech difficulty and light-headedness.   Psychiatric/Behavioral:  Negative for behavioral problems.        Visit Vitals  /60 (BP Location: Left arm, Patient Position: Sitting, BP Cuff Size: Adult)   Pulse 95   Temp 36.5 °C (97.7 °F) (Temporal)   Wt  92.1 kg (203 lb)   SpO2 97%   BMI 30.87 kg/m²   Smoking Status Former   BSA 2.1 m²           Wt Readings from Last 10 Encounters:   09/18/24 92.1 kg (203 lb)   04/18/24 106 kg (233 lb)   04/02/24 103 kg (226 lb 9.6 oz)   03/13/24 103 kg (226 lb)   02/20/24 105 kg (230 lb 12.8 oz)   02/16/24 104 kg (229 lb)   02/13/24 104 kg (230 lb 6.1 oz)   01/31/24 105 kg (232 lb)   01/30/24 105 kg (232 lb)   12/05/23 106 kg (234 lb)        Physical Exam  Constitutional:       General: She is not in acute distress.     Appearance: Normal appearance.   HENT:      Head: Normocephalic.      Right Ear: Tympanic membrane normal.      Left Ear: Tympanic membrane normal.      Mouth/Throat:      Mouth: Mucous membranes are moist.   Cardiovascular:      Rate and Rhythm: Normal rate and regular rhythm.      Heart sounds: No murmur heard.  Pulmonary:      Effort: No respiratory distress.   Abdominal:      Palpations: Abdomen is soft.   Musculoskeletal:      Cervical back: Neck supple.      Right lower leg: No edema.      Left lower leg: No edema.   Skin:     Findings: No rash.   Neurological:      General: No focal deficit present.      Mental Status: She is alert and oriented to person, place, and time.   Psychiatric:         Mood and Affect: Mood normal.            RECENT LABS:  Lab Results   Component Value Date    WBC 5.2 02/11/2024    HGB 11.5 (L) 02/11/2024    HCT 38.1 02/11/2024     02/11/2024    CHOL 116 11/30/2023    TRIG 85 11/30/2023    HDL 58.6 11/30/2023    ALT 38 02/08/2024    AST 43 (H) 02/08/2024     02/13/2024    K 4.2 02/13/2024     02/13/2024    CREATININE 0.68 02/13/2024    BUN 11 02/13/2024    CO2 28 02/13/2024    TSH 19.24 (H) 11/30/2023    INR 1.1 02/07/2024    HGBA1C 12 (A) 09/18/2024     Lab Results   Component Value Date    GLUCOSE 182 (H) 02/13/2024    CALCIUM 8.7 02/13/2024     02/13/2024    K 4.2 02/13/2024    CO2 28 02/13/2024     02/13/2024    BUN 11 02/13/2024    CREATININE 0.68  02/13/2024      Lab Results   Component Value Date    LDLCALC 40 11/30/2023     Lab Results   Component Value Date    HGBA1C 12 (A) 09/18/2024    HGBA1C 7.8 (H) 11/30/2023    HGBA1C 6.7 (A) 06/15/2023     Lab Results   Component Value Date    LDLCALC 40 11/30/2023    CREATININE 0.68 02/13/2024       MEDICATIONS:   Current Outpatient Medications   Medication Instructions    albuterol 90 mcg/actuation inhaler 2 puffs, inhalation, Every 4 hours PRN    aspirin 81 mg chewable tablet 1 tablet, oral, Daily    atorvastatin (LIPITOR) 80 mg, oral, Nightly    clopidogrel (PLAVIX) 75 mg, oral, Daily    dapagliflozin propanediol (FARXIGA) 10 mg, oral, Daily    [START ON 9/22/2024] ergocalciferol (VITAMIN D-2) 50,000 Units, oral, Once Weekly    fluticasone furoate-vilanteroL (Breo Ellipta) 100-25 mcg/dose inhaler inhalation, As directed PRN    levothyroxine (SYNTHROID, LEVOXYL) 137 mcg, oral, Daily    naloxone (NARCAN) 4 mg, nasal, As needed    OneTouch Delica Plus Lancet 33 gauge misc Daily, AS DIRECTED    OneTouch Verio test strips strip Daily    oxyCODONE (ROXICODONE) 15 mg, oral, Every 6 hours PRN    Spiriva Respimat 2.5 mcg/actuation inhaler 2 puffs, inhalation, Daily        TODAY'S VISIT  DX:      1. Encounter for subsequent annual wellness visit (AWV) in Medicare patient  Patient was depressed lately.  She has stopped all her medications for last 2 months.  We are bringing back few medications that are essential and lifesaver..      2. Type 2 diabetes mellitus with hyperglycemia, with long-term current use of insulin (Multi)  A1c today in the office is  12%    dapagliflozin propanediol (Farxiga) 10 mg      3. Acute respiratory failure with hypoxia (Multi)  albuterol 90 mcg/actuation inhaler      4. Mixed hyperlipidemia  atorvastatin (Lipitor) 80 mg tablet      5. Coronary artery disease involving native coronary artery of native heart without angina pectoris  clopidogrel (Plavix) 75 mg tablet      6. Vitamin D deficiency   ergocalciferol (Vitamin D-2) 1.25 MG (97720 UT) capsule      7. Hypothyroidism, unspecified type  levothyroxine (Synthroid, Levoxyl) 137 mcg tablet      8. Major depressive disorder, single episode, moderate (Multi)  Patient would see a psychotherapist.  We discussed about antidepressant.  Patient is not interested to take any more pills.  Too much pills were making her depressed and frustrated.         MEDICAL DECISION MAKING:   - Relevant correspondence/notes from specialty consultants were reviewed.  - Active co morbidities conditions are stable for now.    - Cognitive function stable.    - Immunizations are age appropriately up to date.   - Preventative cancer screening tests are up-to-date.    - Medications have been sent for refill  - F/U in 4 weeks              PS: This note was completed using Dragon voice recognition technology and may include unintended errors with respect to translation of words, typographical errors or grammar errors which may not have been identified while finalizing the chart.

## 2024-09-18 NOTE — TELEPHONE ENCOUNTER
Patient called stating that the need medication that  prescribed (Farxiga) is too expensive (Was told by  to call the office if the medication was too expensive, stated that even with insurance she will have to pay $150).  sent an RX for Januvia but is afraid that after 1 month of patient being on the Januvia that patient might have to go on insulin. Patient is aware of new RX and is going to talk to pharmacy to see what else she can get that isn't so expensive.

## 2024-10-05 DIAGNOSIS — R60.0 LOCALIZED EDEMA: ICD-10-CM

## 2024-10-07 RX ORDER — POTASSIUM CHLORIDE 20 MEQ/1
TABLET, EXTENDED RELEASE ORAL
Qty: 1 TABLET | Refills: 0 | Status: SHIPPED | OUTPATIENT
Start: 2024-10-07

## 2024-10-07 NOTE — TELEPHONE ENCOUNTER
Received request for prescription refills for patient.   Patient follows with Dr. Wellington    Request is for Potassium  Is patient currently on medication no-called pt and pt stated that she is NOT taking potassium.    Last OV 4/2/24  Next OV none noted

## 2024-10-10 ENCOUNTER — APPOINTMENT (OUTPATIENT)
Dept: RADIOLOGY | Facility: HOSPITAL | Age: 64
End: 2024-10-10
Payer: MEDICARE

## 2024-10-10 ENCOUNTER — APPOINTMENT (OUTPATIENT)
Dept: VASCULAR SURGERY | Facility: CLINIC | Age: 64
End: 2024-10-10
Payer: MEDICARE

## 2024-11-03 DIAGNOSIS — I25.10 CORONARY ARTERY DISEASE INVOLVING NATIVE CORONARY ARTERY OF NATIVE HEART WITHOUT ANGINA PECTORIS: ICD-10-CM

## 2024-11-04 RX ORDER — CLOPIDOGREL BISULFATE 75 MG/1
75 TABLET ORAL DAILY
Qty: 30 TABLET | Refills: 0 | Status: SHIPPED | OUTPATIENT
Start: 2024-11-04

## 2024-11-04 NOTE — TELEPHONE ENCOUNTER
Received request for prescription refills for patient.   Patient follows with Dr. Wellington    Request is for Plavix  Is patient currently on medication yes    Last OV 4/2/2024  Next OV none- pending, pt was to follow up in 6/2024    Sent to  to make follow up     Telephone Encounter by Brady Mcneill MD, FACC at 10/25/17 12:35 PM     Author:  Brady Mcneill MD, FACC Service:  (none) Author Type:  Physician     Filed:  10/25/17 12:35 PM Encounter Date:  10/25/2017 Status:  Signed     :  Brady Mcneill MD, FACC (Physician)            Please notify patient of normal imaging portion of stress thal. Follow-up with ordering MD. Thanks.  Electronically Signed by:    Brady Mcneill MD, FACC , 10/25/2017[DC1.1T]         Revision History        User Key Date/Time User Provider Type Action    > DC1.1 10/25/17 12:35 PM Brady Mcneill MD, FACC Physician Sign    T - Template

## 2024-11-07 DIAGNOSIS — I25.10 CORONARY ARTERY DISEASE INVOLVING NATIVE CORONARY ARTERY OF NATIVE HEART WITHOUT ANGINA PECTORIS: ICD-10-CM

## 2024-11-07 NOTE — TELEPHONE ENCOUNTER
Received request for prescription refills for patient.   Patient follows with     Request is for Plavix  Is patient currently on medication yes    Last OV 4/2/2024  Next OV 11/19/2024    Pended for signing and sent to provider

## 2024-11-11 RX ORDER — CLOPIDOGREL BISULFATE 75 MG/1
75 TABLET ORAL DAILY
Qty: 90 TABLET | Refills: 3 | Status: SHIPPED | OUTPATIENT
Start: 2024-11-11

## 2024-11-15 DIAGNOSIS — E55.9 VITAMIN D DEFICIENCY: ICD-10-CM

## 2024-11-15 RX ORDER — ERGOCALCIFEROL 1.25 MG/1
50000 CAPSULE ORAL
Qty: 12 CAPSULE | Refills: 3 | Status: SHIPPED | OUTPATIENT
Start: 2024-11-17 | End: 2025-11-17

## 2024-11-15 NOTE — TELEPHONE ENCOUNTER
9/18/2024    [id number] - [place], [place] - [address] AT [facility] [address]  [address]  [facility] [address]  Phone: [phone] Fax: [phone]    Next office visit 11/18/2024

## 2024-11-18 ENCOUNTER — APPOINTMENT (OUTPATIENT)
Dept: PRIMARY CARE | Facility: CLINIC | Age: 64
End: 2024-11-18
Payer: MEDICARE

## 2024-11-18 VITALS
BODY MASS INDEX: 30.46 KG/M2 | WEIGHT: 201 LBS | HEART RATE: 94 BPM | OXYGEN SATURATION: 98 % | SYSTOLIC BLOOD PRESSURE: 112 MMHG | HEIGHT: 68 IN | DIASTOLIC BLOOD PRESSURE: 80 MMHG | TEMPERATURE: 97.9 F

## 2024-11-18 DIAGNOSIS — R05.1 ACUTE COUGH: ICD-10-CM

## 2024-11-18 DIAGNOSIS — J44.1 COPD EXACERBATION (MULTI): ICD-10-CM

## 2024-11-18 DIAGNOSIS — E55.9 VITAMIN D DEFICIENCY: ICD-10-CM

## 2024-11-18 DIAGNOSIS — E11.65 TYPE 2 DIABETES MELLITUS WITH HYPERGLYCEMIA, WITH LONG-TERM CURRENT USE OF INSULIN: Primary | ICD-10-CM

## 2024-11-18 DIAGNOSIS — J40 BRONCHITIS: ICD-10-CM

## 2024-11-18 DIAGNOSIS — Z79.4 TYPE 2 DIABETES MELLITUS WITH HYPERGLYCEMIA, WITH LONG-TERM CURRENT USE OF INSULIN: Primary | ICD-10-CM

## 2024-11-18 DIAGNOSIS — R73.9 HYPERGLYCEMIA: ICD-10-CM

## 2024-11-18 PROCEDURE — 3074F SYST BP LT 130 MM HG: CPT | Performed by: INTERNAL MEDICINE

## 2024-11-18 PROCEDURE — G2211 COMPLEX E/M VISIT ADD ON: HCPCS | Performed by: INTERNAL MEDICINE

## 2024-11-18 PROCEDURE — 1160F RVW MEDS BY RX/DR IN RCRD: CPT | Performed by: INTERNAL MEDICINE

## 2024-11-18 PROCEDURE — 1123F ACP DISCUSS/DSCN MKR DOCD: CPT | Performed by: INTERNAL MEDICINE

## 2024-11-18 PROCEDURE — 99214 OFFICE O/P EST MOD 30 MIN: CPT | Performed by: INTERNAL MEDICINE

## 2024-11-18 PROCEDURE — 3008F BODY MASS INDEX DOCD: CPT | Performed by: INTERNAL MEDICINE

## 2024-11-18 PROCEDURE — 83036 HEMOGLOBIN GLYCOSYLATED A1C: CPT | Performed by: INTERNAL MEDICINE

## 2024-11-18 PROCEDURE — 94640 AIRWAY INHALATION TREATMENT: CPT | Performed by: INTERNAL MEDICINE

## 2024-11-18 PROCEDURE — 1159F MED LIST DOCD IN RCRD: CPT | Performed by: INTERNAL MEDICINE

## 2024-11-18 PROCEDURE — 3079F DIAST BP 80-89 MM HG: CPT | Performed by: INTERNAL MEDICINE

## 2024-11-18 RX ORDER — ERGOCALCIFEROL 1.25 MG/1
50000 CAPSULE ORAL
Qty: 12 CAPSULE | Refills: 3 | Status: SHIPPED | OUTPATIENT
Start: 2024-11-24 | End: 2025-11-24

## 2024-11-18 RX ORDER — IPRATROPIUM BROMIDE AND ALBUTEROL SULFATE 2.5; .5 MG/3ML; MG/3ML
3 SOLUTION RESPIRATORY (INHALATION) ONCE
Status: COMPLETED | OUTPATIENT
Start: 2024-11-18 | End: 2024-11-18

## 2024-11-18 RX ORDER — IPRATROPIUM BROMIDE AND ALBUTEROL SULFATE 2.5; .5 MG/3ML; MG/3ML
3 SOLUTION RESPIRATORY (INHALATION)
Qty: 360 ML | Refills: 11 | Status: SHIPPED | OUTPATIENT
Start: 2024-11-18 | End: 2024-11-19 | Stop reason: SDUPTHER

## 2024-11-18 RX ORDER — DOXYCYCLINE 100 MG/1
100 CAPSULE ORAL 2 TIMES DAILY
Qty: 20 CAPSULE | Refills: 0 | Status: SHIPPED | OUTPATIENT
Start: 2024-11-18 | End: 2024-11-28

## 2024-11-18 RX ORDER — BENZONATATE 200 MG/1
200 CAPSULE ORAL 3 TIMES DAILY PRN
Qty: 21 CAPSULE | Refills: 1 | Status: SHIPPED | OUTPATIENT
Start: 2024-11-18 | End: 2024-12-18

## 2024-11-18 RX ORDER — METHYLPREDNISOLONE 4 MG/1
TABLET ORAL
Qty: 21 TABLET | Refills: 0 | Status: SHIPPED | OUTPATIENT
Start: 2024-11-18

## 2024-11-18 RX ADMIN — IPRATROPIUM BROMIDE AND ALBUTEROL SULFATE 3 ML: 2.5; .5 SOLUTION RESPIRATORY (INHALATION) at 13:15

## 2024-11-18 ASSESSMENT — ENCOUNTER SYMPTOMS: DEPRESSION: 0

## 2024-11-18 NOTE — PROGRESS NOTES
Daphney Chen, pleasant 64 y.o. female was seen today:     Chief Complaint   Patient presents with    Follow-up    Cough     Patient here for follow-up. Stated that she has had a cough and nose dripping.        VISIT SUMMERY:    Daphney Chen   Has been having cough, congestion, thick nasal discharge.  Patient denies any fever and chills.  Patient does not have any history of bronchial asthma.  During this time of the season, patient usually gets similar symptoms. Patient tried over-the-counter remedies but did not feel better.  There is some postnasal drip, occasional sore throat.  No shortness of breath or dyspnea on exertion.  Nobody else is sick in the family.  Otherwise appetite is somewhat good.  These symptoms have been there for about 3 to 4 days now. The COVID rapid antigen test is not done.          MEDICATIONS:   Current Outpatient Medications   Medication Instructions    albuterol 90 mcg/actuation inhaler 2 puffs, inhalation, Every 4 hours PRN    aspirin 81 mg chewable tablet 1 tablet, Daily    atorvastatin (LIPITOR) 80 mg, oral, Nightly    benzonatate (TESSALON) 200 mg, oral, 3 times daily PRN, Do not crush or chew.    clopidogrel (PLAVIX) 75 mg, oral, Daily    dapagliflozin propanediol (FARXIGA) 10 mg, oral, Daily    doxycycline (VIBRAMYCIN) 100 mg, oral, 2 times daily, Take with at least 8 ounces (large glass) of water, do not lie down for 30 minutes after    [START ON 11/24/2024] ergocalciferol (VITAMIN D-2) 50,000 Units, oral, Once Weekly    fluticasone furoate-vilanteroL (Breo Ellipta) 100-25 mcg/dose inhaler Inhale. As directed PRN    levothyroxine (SYNTHROID, LEVOXYL) 137 mcg, oral, Daily    methylPREDNISolone (Medrol Dospak) 4 mg tablets Take as directed on package.    naloxone (NARCAN) 4 mg, As needed    OneTouch Delica Plus Lancet 33 gauge misc Daily    OneTouch Verio test strips strip Daily    oxyCODONE (ROXICODONE) 15 mg, Every 6 hours PRN    potassium chloride CR 20 mEq ER tablet TAKE  "1 TABLET(20 MEQ) BY MOUTH EVERY DAY. DO NOT CRUSH OR CHEW    SITagliptin phosphate (JANUVIA) 100 mg, oral, Daily    Spiriva Respimat 2.5 mcg/actuation inhaler 2 puffs, Daily       TODAY'S VISIT  DX:     1. Acute cough  benzonatate (Tessalon) 200 mg capsule      2. Vitamin D deficiency  ergocalciferol (Vitamin D-2) 1.25 MG (14272 UT) capsule      3. COPD exacerbation (Multi)  doxycycline (Vibramycin) 100 mg capsule    ipratropium-albuteroL (Duo-Neb) 0.5-2.5 mg/3 mL nebulizer solution 3 mL      4. Bronchitis  methylPREDNISolone (Medrol Dospak) 4 mg tablets           MEDICAL DECISION MAKING:  - Treatment and therapy plan are as above   - Active medical co morbidities have been considered.   - Recent lab work and relevant imaging studies were reviewed.    - Correspondence/notes from specialty consultants were checked.    - Next Follow up in 3 months      Review of Systems   Constitutional:  Negative for activity change and fever.   HENT:  Negative for hearing loss, nosebleeds and tinnitus.    Eyes:  Negative for redness.   Respiratory:  Negative for chest tightness and stridor.    Cardiovascular:  Negative for chest pain, palpitations and leg swelling.   Gastrointestinal:  Negative for blood in stool.   Endocrine: Negative for cold intolerance.   Genitourinary:  Negative for hematuria.   Musculoskeletal:  Negative for joint swelling.   Skin:  Negative for rash.   Neurological:  Negative for speech difficulty and light-headedness.   Psychiatric/Behavioral:  Negative for behavioral problems.      Visit Vitals  /80 (BP Location: Left arm, Patient Position: Sitting, BP Cuff Size: Adult)   Pulse 94   Temp 36.6 °C (97.9 °F) (Temporal)   Ht 1.727 m (5' 8\")   Wt 91.2 kg (201 lb)   SpO2 98%   BMI 30.56 kg/m²   Smoking Status Former   BSA 2.09 m²         Wt Readings from Last 10 Encounters:   11/18/24 91.2 kg (201 lb)   09/18/24 92.1 kg (203 lb)   04/18/24 106 kg (233 lb)   04/02/24 103 kg (226 lb 9.6 oz)   03/13/24 103 kg " (226 lb)   02/20/24 105 kg (230 lb 12.8 oz)   02/16/24 104 kg (229 lb)   02/13/24 104 kg (230 lb 6.1 oz)   01/31/24 105 kg (232 lb)   01/30/24 105 kg (232 lb)     Physical Exam  Constitutional:       General: She is not in acute distress.     Appearance: Normal appearance.   HENT:      Head: Normocephalic.      Right Ear: Tympanic membrane normal.      Left Ear: Tympanic membrane normal.      Mouth/Throat:      Mouth: Mucous membranes are moist.   Cardiovascular:      Rate and Rhythm: Normal rate and regular rhythm.      Heart sounds: No murmur heard.  Pulmonary:      Effort: No respiratory distress.   Abdominal:      Palpations: Abdomen is soft.   Musculoskeletal:      Cervical back: Neck supple.      Right lower leg: No edema.      Left lower leg: No edema.   Skin:     Findings: No rash.   Neurological:      General: No focal deficit present.      Mental Status: She is alert and oriented to person, place, and time.   Psychiatric:         Mood and Affect: Mood normal.            RECENT LABS:  Lab Results   Component Value Date    WBC 5.2 02/11/2024    HGB 11.5 (L) 02/11/2024    HCT 38.1 02/11/2024     02/11/2024    CHOL 116 11/30/2023    TRIG 85 11/30/2023    HDL 58.6 11/30/2023    ALT 38 02/08/2024    AST 43 (H) 02/08/2024     02/13/2024    K 4.2 02/13/2024     02/13/2024    CREATININE 0.68 02/13/2024    BUN 11 02/13/2024    CO2 28 02/13/2024    TSH 19.24 (H) 11/30/2023    INR 1.1 02/07/2024    HGBA1C 12 (A) 09/18/2024     Lab Results   Component Value Date    GLUCOSE 182 (H) 02/13/2024    CALCIUM 8.7 02/13/2024     02/13/2024    K 4.2 02/13/2024    CO2 28 02/13/2024     02/13/2024    BUN 11 02/13/2024    CREATININE 0.68 02/13/2024      Lab Results   Component Value Date    LDLCALC 40 11/30/2023     Lab Results   Component Value Date    HGBA1C 12 (A) 09/18/2024    HGBA1C 7.8 (H) 11/30/2023    HGBA1C 6.7 (A) 06/15/2023     Lab Results   Component Value Date    LDLCALC 40 11/30/2023     CREATININE 0.68 02/13/2024             P.S: This note was completed using Dragon voice recognition technology and may include unintended errors with respect to translation of words, typographical errors or grammar errors which may not have been identified while finalizing the chart.

## 2024-11-19 ENCOUNTER — APPOINTMENT (OUTPATIENT)
Dept: CARDIOLOGY | Facility: CLINIC | Age: 64
End: 2024-11-19
Payer: MEDICARE

## 2024-11-19 VITALS
HEART RATE: 60 BPM | SYSTOLIC BLOOD PRESSURE: 100 MMHG | DIASTOLIC BLOOD PRESSURE: 70 MMHG | WEIGHT: 203.4 LBS | HEIGHT: 68 IN | BODY MASS INDEX: 30.83 KG/M2

## 2024-11-19 DIAGNOSIS — R60.0 LOCALIZED EDEMA: ICD-10-CM

## 2024-11-19 DIAGNOSIS — E78.2 MIXED DIABETIC HYPERLIPIDEMIA ASSOCIATED WITH TYPE 2 DIABETES MELLITUS (MULTI): ICD-10-CM

## 2024-11-19 DIAGNOSIS — I71.21 ANEURYSM OF ASCENDING AORTA WITHOUT RUPTURE (CMS-HCC): ICD-10-CM

## 2024-11-19 DIAGNOSIS — I25.10 CORONARY ARTERY DISEASE INVOLVING NATIVE CORONARY ARTERY OF NATIVE HEART WITHOUT ANGINA PECTORIS: ICD-10-CM

## 2024-11-19 DIAGNOSIS — E11.59 HYPERTENSION ASSOCIATED WITH TYPE 2 DIABETES MELLITUS (MULTI): ICD-10-CM

## 2024-11-19 DIAGNOSIS — J44.1 COPD EXACERBATION (MULTI): ICD-10-CM

## 2024-11-19 DIAGNOSIS — I15.2 HYPERTENSION ASSOCIATED WITH TYPE 2 DIABETES MELLITUS (MULTI): ICD-10-CM

## 2024-11-19 DIAGNOSIS — Z87.891 FORMER SMOKER: ICD-10-CM

## 2024-11-19 DIAGNOSIS — I51.89 COMBINED SYSTOLIC AND DIASTOLIC CARDIAC DYSFUNCTION: ICD-10-CM

## 2024-11-19 DIAGNOSIS — E11.69 MIXED DIABETIC HYPERLIPIDEMIA ASSOCIATED WITH TYPE 2 DIABETES MELLITUS (MULTI): ICD-10-CM

## 2024-11-19 DIAGNOSIS — I73.9 PVD (PERIPHERAL VASCULAR DISEASE) (CMS-HCC): ICD-10-CM

## 2024-11-19 LAB — POC HEMOGLOBIN A1C: 11.7 % (ref 4.2–6.5)

## 2024-11-19 PROCEDURE — 1036F TOBACCO NON-USER: CPT | Performed by: INTERNAL MEDICINE

## 2024-11-19 PROCEDURE — 1159F MED LIST DOCD IN RCRD: CPT | Performed by: INTERNAL MEDICINE

## 2024-11-19 PROCEDURE — 3078F DIAST BP <80 MM HG: CPT | Performed by: INTERNAL MEDICINE

## 2024-11-19 PROCEDURE — 99214 OFFICE O/P EST MOD 30 MIN: CPT | Performed by: INTERNAL MEDICINE

## 2024-11-19 PROCEDURE — 1123F ACP DISCUSS/DSCN MKR DOCD: CPT | Performed by: INTERNAL MEDICINE

## 2024-11-19 PROCEDURE — 3008F BODY MASS INDEX DOCD: CPT | Performed by: INTERNAL MEDICINE

## 2024-11-19 PROCEDURE — 3074F SYST BP LT 130 MM HG: CPT | Performed by: INTERNAL MEDICINE

## 2024-11-19 RX ORDER — IPRATROPIUM BROMIDE AND ALBUTEROL SULFATE 2.5; .5 MG/3ML; MG/3ML
3 SOLUTION RESPIRATORY (INHALATION)
Qty: 360 ML | Refills: 11 | Status: SHIPPED | OUTPATIENT
Start: 2024-11-19 | End: 2025-11-19

## 2024-11-19 RX ORDER — ATORVASTATIN CALCIUM 40 MG/1
40 TABLET, FILM COATED ORAL DAILY
Qty: 90 TABLET | Refills: 3 | Status: SHIPPED | OUTPATIENT
Start: 2024-11-19 | End: 2025-11-19

## 2024-11-19 RX ORDER — FUROSEMIDE 20 MG/1
TABLET ORAL
Qty: 36 TABLET | Refills: 3 | Status: SHIPPED | OUTPATIENT
Start: 2024-11-19 | End: 2024-11-20

## 2024-11-19 ASSESSMENT — ENCOUNTER SYMPTOMS
RESPIRATORY NEGATIVE: 1
CARDIOVASCULAR NEGATIVE: 1
CONSTITUTIONAL NEGATIVE: 1
NEUROLOGICAL NEGATIVE: 1

## 2024-11-19 NOTE — TELEPHONE ENCOUNTER
Duplicate refill request for Furosemide recv'd, RX was sent today by Dr. Wellington.  Pended duplicate for refusal.

## 2024-11-19 NOTE — PROGRESS NOTES
CARDIOLOGY OFFICE VISIT      CHIEF COMPLAINT  Chief Complaint   Patient presents with    Follow-up     Pt here for overdue follow up and to get med refills        HISTORY OF PRESENT ILLNESS  Daphney Chen is a 64 y.o. year old female patient with history of peripheral vascular disease and bifemoral bypass secondary to critical limb ischemia, carotid stenosis and subclavian artery stenosis, hypertension as well as CAD s/p stenting to the RCA in February 2023.  Echo from June 2024 shows normal LV function with mild mitral and tricuspid regurgitation.    She is said she fell into depression and had been off her medications for several months but now that she is taking the medications, she has noticed some increased lower extremity swelling.  She continues to deny any chest pain..    ASSESSMENT AND PLAN  1.  CAD with a history of multivessel angioplasty and s/p RCA stent and recent cardiac catheterization that showed occluded left circumflex filling with collaterals and 100% mid RCA was stented in February 2023  2.  Peripheral vascular disease s/p right axillary to bifemoral bypass, recommend follow-up with vascular surgery.  3.  Persistent lower extremity swelling: I will restart Lasix at 20 mg on Mondays Wednesdays and Fridays.  4.  Dyslipidemia: She complains of some muscle soreness, we will cut Lipitor in half and follow-up as scheduled.  Problem List Items Addressed This Visit          Cardiac and Vasculature    PVD (peripheral vascular disease) (CMS-HCC)    Combined systolic and diastolic cardiac dysfunction    Ascending aortic aneurysm (CMS-HCC)    CAD (coronary artery disease)    Hypertension associated with type 2 diabetes mellitus (Multi)    Relevant Medications    furosemide (Lasix) 20 mg tablet    Mixed diabetic hyperlipidemia associated with type 2 diabetes mellitus (Multi)    Relevant Medications    atorvastatin (Lipitor) 40 mg tablet       Endocrine/Metabolic    BMI 30.0-30.9,adult       Tobacco     Former smoker     Other Visit Diagnoses       Localized edema        Relevant Medications    furosemide (Lasix) 20 mg tablet            Recent Cardiovascular Testing:    Echo-  Stress-  Cath-  Carotid Ultrasound-    Past Medical History  No past medical history on file.    Social History  Social History     Tobacco Use    Smoking status: Every Day     Current packs/day: 0.25     Average packs/day: 1 pack/day for 30.2 years (30.1 ttl pk-yrs)     Types: Cigarettes     Start date: 03/1990     Last attempt to quit: 03/2020    Smokeless tobacco: Never   Vaping Use    Vaping status: Never Used   Substance Use Topics    Alcohol use: Not Currently    Drug use: Never       Family History     Family History   Problem Relation Name Age of Onset    Diabetes Mother      Hypertension Mother      Heart attack Mother      Heart attack Daughter      Other (blood cancer) Daughter          Allergies:  Allergies   Allergen Reactions    Morphine Itching and Rash     Feels like bugs are crawling on her        Outpatient Medications:  Current Outpatient Medications   Medication Instructions    albuterol 90 mcg/actuation inhaler 2 puffs, inhalation, Every 4 hours PRN    aspirin 81 mg chewable tablet 1 tablet, Daily    atorvastatin (LIPITOR) 40 mg, oral, Daily    benzonatate (TESSALON) 200 mg, oral, 3 times daily PRN, Do not crush or chew.    clopidogrel (PLAVIX) 75 mg, oral, Daily    dapagliflozin propanediol (FARXIGA) 10 mg, oral, Daily    doxycycline (VIBRAMYCIN) 100 mg, oral, 2 times daily, Take with at least 8 ounces (large glass) of water, do not lie down for 30 minutes after    [START ON 11/24/2024] ergocalciferol (VITAMIN D-2) 50,000 Units, oral, Once Weekly    furosemide (Lasix) 20 mg tablet Take one tablet on Monday Wednesday and friday    ipratropium-albuteroL (Duo-Neb) 0.5-2.5 mg/3 mL nebulizer solution 3 mL, nebulization, 4 times daily RT    levothyroxine (SYNTHROID, LEVOXYL) 137 mcg, oral, Daily    methylPREDNISolone (Medrol  Dospak) 4 mg tablets Take as directed on package.    naloxone (NARCAN) 4 mg, As needed    OneTouch Delica Plus Lancet 33 gauge misc Daily    OneTouch Verio test strips strip Daily    oxyCODONE (ROXICODONE) 15 mg, Every 6 hours PRN    potassium chloride CR 20 mEq ER tablet TAKE 1 TABLET(20 MEQ) BY MOUTH EVERY DAY. DO NOT CRUSH OR CHEW    SITagliptin phosphate (JANUVIA) 100 mg, oral, Daily    Spiriva Respimat 2.5 mcg/actuation inhaler 2 puffs, Daily        Recent Lab Results:    CBC:   Lab Results   Component Value Date    WBC 5.2 02/11/2024    RBC 4.16 02/11/2024    HGB 11.5 (L) 02/11/2024    HCT 38.1 02/11/2024     02/11/2024        CMP:    Lab Results   Component Value Date     02/13/2024    K 4.2 02/13/2024     02/13/2024    CO2 28 02/13/2024    BUN 11 02/13/2024    CREATININE 0.68 02/13/2024    GLUCOSE 182 (H) 02/13/2024    CALCIUM 8.7 02/13/2024       Magnesium:    Lab Results   Component Value Date    MG 1.67 02/07/2024       Lipid Profile:    Lab Results   Component Value Date    TRIG 85 11/30/2023    HDL 58.6 11/30/2023    LDLCALC 40 11/30/2023       TSH:    Lab Results   Component Value Date    TSH 19.24 (H) 11/30/2023       BNP:   Lab Results   Component Value Date    BNP 43 02/07/2024        PT/INR:    Lab Results   Component Value Date    PROTIME 11.9 02/07/2024    INR 1.1 02/07/2024       HgBA1c:    Lab Results   Component Value Date    HGBA1C 11.7 (A) 11/19/2024       BMP:  Lab Results   Component Value Date     02/13/2024     (L) 02/12/2024     02/09/2024    K 4.2 02/13/2024    K 4.3 02/12/2024    K 3.8 02/09/2024     02/13/2024     02/12/2024     02/09/2024    CO2 28 02/13/2024    CO2 27 02/12/2024    CO2 29 02/09/2024    BUN 11 02/13/2024    BUN 11 02/12/2024    BUN 18 02/09/2024    CREATININE 0.68 02/13/2024    CREATININE 0.67 02/12/2024    CREATININE 0.86 02/09/2024       CBC:  Lab Results   Component Value Date    WBC 5.2 02/11/2024    WBC 4.7  02/08/2024    WBC 6.0 02/07/2024    RBC 4.16 02/11/2024    RBC 4.40 02/08/2024    RBC 4.34 02/07/2024    HGB 11.5 (L) 02/11/2024    HGB 12.4 02/08/2024    HGB 12.3 02/07/2024    HCT 38.1 02/11/2024    HCT 40.3 02/08/2024    HCT 39.1 02/07/2024    MCV 92 02/11/2024    MCV 92 02/08/2024    MCV 90 02/07/2024    MCH 27.6 02/11/2024    MCH 28.2 02/08/2024    MCH 28.3 02/07/2024    MCHC 30.2 (L) 02/11/2024    MCHC 30.8 (L) 02/08/2024    MCHC 31.5 (L) 02/07/2024    RDW 15.6 (H) 02/11/2024    RDW 15.4 (H) 02/08/2024    RDW 15.4 (H) 02/07/2024     02/11/2024     02/08/2024     02/07/2024       Cardiac Enzymes:    Lab Results   Component Value Date    TROPHS 17 (H) 02/07/2024    TROPHS 17 (H) 02/07/2024    TROPHS 245 (H) 06/25/2023       Hepatic Function Panel:    Lab Results   Component Value Date    ALKPHOS 130 02/08/2024    ALT 38 02/08/2024    AST 43 (H) 02/08/2024    PROT 6.6 02/08/2024    BILITOT 0.6 02/08/2024    BILIDIR 0.9 (H) 06/15/2023         REVIEW OF SYSTEMS  Review of Systems   Constitutional: Negative.   Cardiovascular: Negative.    Respiratory: Negative.     Neurological: Negative.    All other systems reviewed and are negative.      VITALS  Vitals:    11/19/24 1149   BP: 100/70   Pulse: 60     Wt Readings from Last 4 Encounters:   11/19/24 92.3 kg (203 lb 6.4 oz)   11/18/24 91.2 kg (201 lb)   09/18/24 92.1 kg (203 lb)   04/18/24 106 kg (233 lb)       PHYSICAL EXAM  Constitutional:       Appearance: Healthy appearance. Not in distress.   Eyes:      Conjunctiva/sclera: Conjunctivae normal.      Pupils: Pupils are equal, round, and reactive to light.   Neck:      Vascular: No JVR. JVD normal.      Comments: Right carotid bruit  Pulmonary:      Effort: Pulmonary effort is normal.      Breath sounds: Normal breath sounds. No wheezing. No rhonchi. No rales.   Chest:      Chest wall: Not tender to palpatation.   Cardiovascular:      PMI at left midclavicular line. Normal rate. Regular rhythm.  Normal S1. Normal S2.       Murmurs:  3/6 S E Murmur at the base      No gallop.  No click. No rub.   Pulses:     Intact distal pulses.   Edema:     Peripheral edema absent.      Comments: 1-2+ bilateral lower extremity edema  Abdominal:      Tenderness: There is no abdominal tenderness.   Musculoskeletal: Normal range of motion.         General: No tenderness.      Cervical back: Normal range of motion. Skin:     General: Skin is warm and dry.   Neurological:      General: No focal deficit present.      Mental Status: Alert and oriented to person, place and time.

## 2024-11-19 NOTE — PATIENT INSTRUCTIONS
6 month visit  Decrease atorvastatin to  40 mg a day  Start furosemide 20mg on Monday Wednesday and Friday    Patient educated on proper medication use.   Patient educated on risk factor modification.   Please bring any lab results from other providers / physicians to your next appointment.     Please bring all medicines, vitamins, and herbal supplements with you when you come to the office.     Prescriptions will not be filled unless you are compliant with your follow up appointments or have a follow up appointment scheduled as per instruction of your physician. Refills should be requested at the time of your visit.

## 2024-11-20 RX ORDER — FUROSEMIDE 20 MG/1
TABLET ORAL
Qty: 38 TABLET | Refills: 3 | Status: SHIPPED | OUTPATIENT
Start: 2024-11-20

## 2024-12-04 DIAGNOSIS — E11.59 HYPERTENSION ASSOCIATED WITH TYPE 2 DIABETES MELLITUS (MULTI): ICD-10-CM

## 2024-12-04 DIAGNOSIS — I15.2 HYPERTENSION ASSOCIATED WITH TYPE 2 DIABETES MELLITUS (MULTI): ICD-10-CM

## 2024-12-06 RX ORDER — METOLAZONE 2.5 MG/1
TABLET ORAL
Qty: 38 TABLET | Refills: 3 | Status: SHIPPED | OUTPATIENT
Start: 2024-12-06

## 2024-12-07 ASSESSMENT — ENCOUNTER SYMPTOMS
LIGHT-HEADEDNESS: 0
SPEECH DIFFICULTY: 0
HEMATURIA: 0
EYE REDNESS: 0
JOINT SWELLING: 0
BLOOD IN STOOL: 0
PALPITATIONS: 0
ACTIVITY CHANGE: 0
CHEST TIGHTNESS: 0
STRIDOR: 0
FEVER: 0

## 2025-02-20 ENCOUNTER — APPOINTMENT (OUTPATIENT)
Dept: PRIMARY CARE | Facility: CLINIC | Age: 65
End: 2025-02-20
Payer: MEDICARE

## 2025-02-20 VITALS
TEMPERATURE: 97.8 F | BODY MASS INDEX: 30.17 KG/M2 | SYSTOLIC BLOOD PRESSURE: 84 MMHG | HEART RATE: 96 BPM | DIASTOLIC BLOOD PRESSURE: 72 MMHG | WEIGHT: 198.4 LBS | OXYGEN SATURATION: 95 %

## 2025-02-20 DIAGNOSIS — E11.69 MIXED DIABETIC HYPERLIPIDEMIA ASSOCIATED WITH TYPE 2 DIABETES MELLITUS (MULTI): ICD-10-CM

## 2025-02-20 DIAGNOSIS — I70.223 ATHEROSCLEROSIS OF NATIVE ARTERIES OF EXTREMITIES WITH REST PAIN, BILATERAL LEGS (MULTI): ICD-10-CM

## 2025-02-20 DIAGNOSIS — E66.01 OBESITY, MORBID (MULTI): ICD-10-CM

## 2025-02-20 DIAGNOSIS — F32.1 MAJOR DEPRESSIVE DISORDER, SINGLE EPISODE, MODERATE (MULTI): ICD-10-CM

## 2025-02-20 DIAGNOSIS — C34.11 MALIGNANT NEOPLASM OF UPPER LOBE, RIGHT BRONCHUS OR LUNG: ICD-10-CM

## 2025-02-20 DIAGNOSIS — E78.2 MIXED DIABETIC HYPERLIPIDEMIA ASSOCIATED WITH TYPE 2 DIABETES MELLITUS (MULTI): ICD-10-CM

## 2025-02-20 DIAGNOSIS — J44.1 COPD EXACERBATION (MULTI): ICD-10-CM

## 2025-02-20 DIAGNOSIS — J96.21 ACUTE ON CHRONIC RESPIRATORY FAILURE WITH HYPOXIA (MULTI): ICD-10-CM

## 2025-02-20 DIAGNOSIS — I50.33 ACUTE ON CHRONIC DIASTOLIC (CONGESTIVE) HEART FAILURE: ICD-10-CM

## 2025-02-20 DIAGNOSIS — E11.00 TYPE 2 DIABETES MELLITUS WITH HYPEROSMOLARITY WITHOUT COMA, WITH LONG-TERM CURRENT USE OF INSULIN (MULTI): Primary | ICD-10-CM

## 2025-02-20 DIAGNOSIS — Z79.4 TYPE 2 DIABETES MELLITUS WITH HYPEROSMOLARITY WITHOUT COMA, WITH LONG-TERM CURRENT USE OF INSULIN (MULTI): Primary | ICD-10-CM

## 2025-02-20 LAB — POC HEMOGLOBIN A1C: 9.8 % (ref 4.2–6.5)

## 2025-02-20 PROCEDURE — 1160F RVW MEDS BY RX/DR IN RCRD: CPT | Performed by: INTERNAL MEDICINE

## 2025-02-20 PROCEDURE — 3078F DIAST BP <80 MM HG: CPT | Performed by: INTERNAL MEDICINE

## 2025-02-20 PROCEDURE — 99214 OFFICE O/P EST MOD 30 MIN: CPT | Performed by: INTERNAL MEDICINE

## 2025-02-20 PROCEDURE — 3074F SYST BP LT 130 MM HG: CPT | Performed by: INTERNAL MEDICINE

## 2025-02-20 PROCEDURE — 4004F PT TOBACCO SCREEN RCVD TLK: CPT | Performed by: INTERNAL MEDICINE

## 2025-02-20 PROCEDURE — 83036 HEMOGLOBIN GLYCOSYLATED A1C: CPT | Performed by: INTERNAL MEDICINE

## 2025-02-20 PROCEDURE — 1159F MED LIST DOCD IN RCRD: CPT | Performed by: INTERNAL MEDICINE

## 2025-02-20 PROCEDURE — G2211 COMPLEX E/M VISIT ADD ON: HCPCS | Performed by: INTERNAL MEDICINE

## 2025-02-20 PROCEDURE — 1124F ACP DISCUSS-NO DSCNMKR DOCD: CPT | Performed by: INTERNAL MEDICINE

## 2025-02-20 ASSESSMENT — ENCOUNTER SYMPTOMS
ACTIVITY CHANGE: 0
SPEECH DIFFICULTY: 0
HEMATURIA: 0
PALPITATIONS: 0
FEVER: 0
LIGHT-HEADEDNESS: 0
STRIDOR: 0
CHEST TIGHTNESS: 0
EYE REDNESS: 0
BLOOD IN STOOL: 0
JOINT SWELLING: 0

## 2025-02-20 NOTE — PROGRESS NOTES
Daphney A Gustavo, [facility] 65 y.o. female was seen today:     Chief Complaint   Patient presents with    Follow-up     Patient is here today for a 3 month follow up         Daphney Chen   Comes here for follow-up.  She in the past stopped all her medications but now she started taking her medications.  Her hemoglobin A1c has dropped from 11.8 to 9.7%.  She did not get Farxiga or Januvia because of the financial constraint.  Patient has been trying to increase exercise and decrease calorie consumption.  However because of her chronic low back pain she does not do much exercise.  Her COPD exacerbation is stable although she continues to smoke.  We discussed about smoking cessation.  Currently she is on Spiriva and inhaled aerosols.  Her mental health is at her happy now.  She was in a depressed mood few months ago.  Currently she is not using any oxygen.  However during her COPD exacerbation she does need supplemental oxygen.  We discussed about better diabetic management.  Because of her insurance constraints I like to send semaglutide see if this is covered by her insurance company secondary to diabetes and obesity.  If not patient will give me a call we will start basal insulin for her.    MEDICATIONS:   Current Outpatient Medications   Medication Instructions    albuterol 90 mcg/actuation inhaler 2 puffs, inhalation, Every 4 hours PRN    aspirin 81 mg chewable tablet 1 tablet, Daily    atorvastatin (LIPITOR) 40 mg, oral, Daily    clopidogrel (PLAVIX) 75 mg, oral, Daily    dapagliflozin propanediol (FARXIGA) 10 mg, oral, Daily    ergocalciferol (VITAMIN D-2) 50,000 Units, oral, Once Weekly    furosemide (Lasix) 20 mg tablet TAKE 1 TABLET BY MOUTH ON MONDAY, WEDNESDAY, FRIDAY    ipratropium-albuteroL (Duo-Neb) 0.5-2.5 mg/3 mL nebulizer solution 3 mL, nebulization, 4 times daily RT    levothyroxine (SYNTHROID, LEVOXYL) 137 mcg, oral, Daily    metOLazone (Zaroxolyn) 2.5 mg tablet TAKE ONE TABLET BY MOUTH ON  MONDAY, WEDNESDAY, AND FRIDAY    naloxone (NARCAN) 4 mg, As needed    OneTouch Delica Plus Lancet 33 gauge misc Daily    OneTouch Verio test strips strip Daily    oxyCODONE (ROXICODONE) 15 mg, Every 6 hours PRN    potassium chloride CR 20 mEq ER tablet TAKE 1 TABLET(20 MEQ) BY MOUTH EVERY DAY. DO NOT CRUSH OR CHEW    semaglutide 0.5 mg, subcutaneous, Weekly    SITagliptin phosphate (JANUVIA) 100 mg, oral, Daily    Spiriva Respimat 2.5 mcg/actuation inhaler 2 puffs, Daily       TODAY'S VISIT  DX:     1. Type 2 diabetes mellitus with hyperosmolarity without coma, with long-term current use of insulin (Multi)  semaglutide 0.25 mg or 0.5 mg (2 mg/3 mL) pen injector      2. Malignant neoplasm of upper lobe, right bronchus or lung  Stable denies any shortness of breath      3. COPD exacerbation (Multi)  Stable at this time.  Uses Spiriva      4. Acute on chronic diastolic (congestive) heart failure  Secondary to COPD currently does not have any short of breath.      5. Major depressive disorder, single episode, moderate (Multi)  Does not take any antidepressant at this time.  Her mood is better.        6. Obesity, morbid (Multi)  Hoping semaglutide will help her with obesity.      7. Mixed diabetic hyperlipidemia associated with type 2 diabetes mellitus (Multi)  A1c today in the office is 9.7%.      8. Atherosclerosis of native arteries of extremities with rest pain, bilateral legs (Multi)  Status post peripheral arterial disease procedure.      9. Acute on chronic respiratory failure with hypoxia (Multi)  Currently not using supplemental oxygen.  Her SpO2 today 95% on room air           MEDICAL DECISION MAKING:  - Treatment and therapy plan are as above   - Active medical co morbidities have been considered.   - Recent lab work and relevant imaging studies were reviewed.    - Correspondence/notes from specialty consultants were checked.    - Next Follow up in 3 months    Review of Systems   Constitutional:  Negative for  activity change and fever.   HENT:  Negative for hearing loss, nosebleeds and tinnitus.    Eyes:  Negative for redness.   Respiratory:  Negative for chest tightness and stridor.    Cardiovascular:  Negative for chest pain, palpitations and leg swelling.   Gastrointestinal:  Negative for blood in stool.   Endocrine: Negative for cold intolerance.   Genitourinary:  Negative for hematuria.   Musculoskeletal:  Negative for joint swelling.   Skin:  Negative for rash.   Neurological:  Negative for speech difficulty and light-headedness.   Psychiatric/Behavioral:  Negative for behavioral problems.      Visit Vitals  BP 84/72 (BP Location: Left arm, Patient Position: Sitting, BP Cuff Size: Adult)   Pulse 96   Temp 36.6 °C (97.8 °F) (Temporal)   Wt 90 kg (198 lb 6.4 oz)   SpO2 95% Comment: ANEL   BMI 30.17 kg/m²   Smoking Status Every Day   BSA 2.08 m²         Wt Readings from Last 10 Encounters:   02/20/25 90 kg (198 lb 6.4 oz)   11/19/24 92.3 kg (203 lb 6.4 oz)   11/18/24 91.2 kg (201 lb)   09/18/24 92.1 kg (203 lb)   04/18/24 106 kg (233 lb)   04/02/24 103 kg (226 lb 9.6 oz)   03/13/24 103 kg (226 lb)   02/20/24 105 kg (230 lb 12.8 oz)   02/16/24 104 kg (229 lb)   02/13/24 104 kg (230 lb 6.1 oz)     Physical Exam  Constitutional:       General: She is not in acute distress.     Appearance: Normal appearance.   HENT:      Head: Normocephalic.      Right Ear: Tympanic membrane normal.      Left Ear: Tympanic membrane normal.      Mouth/Throat:      Mouth: Mucous membranes are moist.   Cardiovascular:      Rate and Rhythm: Normal rate and regular rhythm.      Heart sounds: No murmur heard.  Pulmonary:      Effort: No respiratory distress.   Abdominal:      Palpations: Abdomen is soft.   Musculoskeletal:      Cervical back: Neck supple.      Right lower leg: No edema.      Left lower leg: No edema.   Skin:     Findings: No rash.   Neurological:      General: No focal deficit present.      Mental Status: She is alert and  oriented to person, place, and time.   Psychiatric:         Mood and Affect: Mood normal.        RECENT LABS:  Lab Results   Component Value Date    WBC 5.2 02/11/2024    HGB 11.5 (L) 02/11/2024    HCT 38.1 02/11/2024     02/11/2024    CHOL 116 11/30/2023    TRIG 85 11/30/2023    HDL 58.6 11/30/2023    ALT 38 02/08/2024    AST 43 (H) 02/08/2024     02/13/2024    K 4.2 02/13/2024     02/13/2024    CREATININE 0.68 02/13/2024    BUN 11 02/13/2024    CO2 28 02/13/2024    TSH 19.24 (H) 11/30/2023    INR 1.1 02/07/2024    HGBA1C 9.8 (A) 02/20/2025     Lab Results   Component Value Date    GLUCOSE 182 (H) 02/13/2024    CALCIUM 8.7 02/13/2024     02/13/2024    K 4.2 02/13/2024    CO2 28 02/13/2024     02/13/2024    BUN 11 02/13/2024    CREATININE 0.68 02/13/2024      Lab Results   Component Value Date    LDLCALC 40 11/30/2023     Lab Results   Component Value Date    HGBA1C 9.8 (A) 02/20/2025    HGBA1C 11.7 (A) 11/19/2024    HGBA1C 12 (A) 09/18/2024     Lab Results   Component Value Date    LDLCALC 40 11/30/2023    CREATININE 0.68 02/13/2024         P.S: This note was completed using Dragon voice recognition technology and may include unintended errors with respect to translation of words, typographical errors or grammar errors which may not have been identified while finalizing the chart.

## 2025-03-19 DIAGNOSIS — E03.9 HYPOTHYROIDISM, UNSPECIFIED TYPE: ICD-10-CM

## 2025-03-19 RX ORDER — LEVOTHYROXINE SODIUM 137 UG/1
137 TABLET ORAL DAILY
Qty: 90 TABLET | Refills: 3 | Status: SHIPPED | OUTPATIENT
Start: 2025-03-19 | End: 2026-03-19

## 2025-05-13 NOTE — TELEPHONE ENCOUNTER
Received Rx Request from [facility] for Clopidogrel 75mg tablets. Call placed to patient informing her that PCP cannot fill this script due to it being prescribed by Cardiologist Ronald Wellington MD. Patient stated understanding and that she has enough to make it to her next cardio appt but will reach out to cardio for refill.

## 2025-05-20 ENCOUNTER — APPOINTMENT (OUTPATIENT)
Dept: CARDIOLOGY | Facility: CLINIC | Age: 65
End: 2025-05-20
Payer: MEDICARE

## 2025-05-22 ENCOUNTER — APPOINTMENT (OUTPATIENT)
Dept: PRIMARY CARE | Facility: CLINIC | Age: 65
End: 2025-05-22
Payer: MEDICARE

## 2025-05-22 VITALS
OXYGEN SATURATION: 95 % | TEMPERATURE: 98.1 F | BODY MASS INDEX: 29.8 KG/M2 | WEIGHT: 196.6 LBS | HEART RATE: 77 BPM | HEIGHT: 68 IN | SYSTOLIC BLOOD PRESSURE: 106 MMHG | DIASTOLIC BLOOD PRESSURE: 72 MMHG

## 2025-05-22 DIAGNOSIS — C34.11 MALIGNANT NEOPLASM OF UPPER LOBE, RIGHT BRONCHUS OR LUNG: ICD-10-CM

## 2025-05-22 DIAGNOSIS — E11.00 TYPE 2 DIABETES MELLITUS WITH HYPEROSMOLARITY WITHOUT COMA, WITH LONG-TERM CURRENT USE OF INSULIN (MULTI): ICD-10-CM

## 2025-05-22 DIAGNOSIS — M79.622 ARMPIT PAIN, LEFT: Primary | ICD-10-CM

## 2025-05-22 DIAGNOSIS — Z79.4 TYPE 2 DIABETES MELLITUS WITH HYPEROSMOLARITY WITHOUT COMA, WITH LONG-TERM CURRENT USE OF INSULIN (MULTI): ICD-10-CM

## 2025-05-22 DIAGNOSIS — I70.223 ATHEROSCLEROSIS OF NATIVE ARTERIES OF EXTREMITIES WITH REST PAIN, BILATERAL LEGS: ICD-10-CM

## 2025-05-22 DIAGNOSIS — E55.9 VITAMIN D DEFICIENCY: ICD-10-CM

## 2025-05-22 LAB — POC HEMOGLOBIN A1C: 10.6 % (ref 4.2–6.5)

## 2025-05-22 PROCEDURE — 3046F HEMOGLOBIN A1C LEVEL >9.0%: CPT | Performed by: INTERNAL MEDICINE

## 2025-05-22 PROCEDURE — G2211 COMPLEX E/M VISIT ADD ON: HCPCS | Performed by: INTERNAL MEDICINE

## 2025-05-22 PROCEDURE — 3008F BODY MASS INDEX DOCD: CPT | Performed by: INTERNAL MEDICINE

## 2025-05-22 PROCEDURE — 3078F DIAST BP <80 MM HG: CPT | Performed by: INTERNAL MEDICINE

## 2025-05-22 PROCEDURE — 1159F MED LIST DOCD IN RCRD: CPT | Performed by: INTERNAL MEDICINE

## 2025-05-22 PROCEDURE — 83036 HEMOGLOBIN GLYCOSYLATED A1C: CPT | Performed by: INTERNAL MEDICINE

## 2025-05-22 PROCEDURE — 3074F SYST BP LT 130 MM HG: CPT | Performed by: INTERNAL MEDICINE

## 2025-05-22 PROCEDURE — 4004F PT TOBACCO SCREEN RCVD TLK: CPT | Performed by: INTERNAL MEDICINE

## 2025-05-22 PROCEDURE — 99214 OFFICE O/P EST MOD 30 MIN: CPT | Performed by: INTERNAL MEDICINE

## 2025-05-22 RX ORDER — IBUPROFEN 800 MG/1
TABLET, FILM COATED ORAL
COMMUNITY
Start: 2025-02-02

## 2025-05-22 RX ORDER — INSULIN GLARGINE 100 [IU]/ML
25 INJECTION, SOLUTION SUBCUTANEOUS NIGHTLY
Qty: 3 ML | Refills: 3 | Status: SHIPPED | OUTPATIENT
Start: 2025-05-22 | End: 2026-05-22

## 2025-05-22 RX ORDER — ERGOCALCIFEROL 1.25 MG/1
50000 CAPSULE ORAL
Qty: 12 CAPSULE | Refills: 3 | Status: CANCELLED | OUTPATIENT
Start: 2025-05-25 | End: 2026-05-25

## 2025-05-22 RX ORDER — BACLOFEN 10 MG/1
1 TABLET ORAL EVERY 8 HOURS PRN
COMMUNITY
Start: 2025-04-18

## 2025-05-22 RX ORDER — CLINDAMYCIN HYDROCHLORIDE 300 MG/1
300 CAPSULE ORAL 3 TIMES DAILY
Qty: 30 CAPSULE | Refills: 0 | Status: SHIPPED | OUTPATIENT
Start: 2025-05-22 | End: 2025-06-01

## 2025-05-22 ASSESSMENT — PATIENT HEALTH QUESTIONNAIRE - PHQ9
10. IF YOU CHECKED OFF ANY PROBLEMS, HOW DIFFICULT HAVE THESE PROBLEMS MADE IT FOR YOU TO DO YOUR WORK, TAKE CARE OF THINGS AT HOME, OR GET ALONG WITH OTHER PEOPLE: SOMEWHAT DIFFICULT
1. LITTLE INTEREST OR PLEASURE IN DOING THINGS: SEVERAL DAYS
2. FEELING DOWN, DEPRESSED OR HOPELESS: NOT AT ALL
2. FEELING DOWN, DEPRESSED OR HOPELESS: SEVERAL DAYS
SUM OF ALL RESPONSES TO PHQ9 QUESTIONS 1 AND 2: 0
SUM OF ALL RESPONSES TO PHQ9 QUESTIONS 1 AND 2: 2
1. LITTLE INTEREST OR PLEASURE IN DOING THINGS: NOT AT ALL

## 2025-05-22 NOTE — PROGRESS NOTES
"Subjective     Patient ID: Daphney Chen is a 65 y.o. female who presents for Diabetes (3 month follow up) and Mass (Left armpit. Tender to touch).  History of Present Illness  The patient presents for evaluation of diabetes and an axillary abscess.    She has been self-administering semaglutide injections at a dosage of 0.25 mg since 02/2025, which she reports as well-tolerated. She is not experiencing any bloating, gas, burping, or excessive flatulence. She has not been utilizing insulin as part of her treatment regimen. She has observed a decrease in her weight, with her pants becoming loose, but is uncertain if this trend has continued. She has made dietary modifications, including a significant reduction in sugar intake, and no longer consumes chocolate bars.    She has discovered a lump in her armpit, which she describes as sore upon palpation. She reports no associated pain or discharge. She does not have axillary hair and therefore does not engage in shaving.    Objective   Vitals:    05/22/25 1311   BP: 106/72   BP Location: Right arm   Patient Position: Sitting   Pulse: 77   Temp: 36.7 °C (98.1 °F)   TempSrc: Temporal   SpO2: 95%   Weight: 89.2 kg (196 lb 9.6 oz)   Height: 1.727 m (5' 8\")     Wt Readings from Last 10 Encounters:   05/22/25 89.2 kg (196 lb 9.6 oz)   02/20/25 90 kg (198 lb 6.4 oz)   11/19/24 92.3 kg (203 lb 6.4 oz)   11/18/24 91.2 kg (201 lb)   09/18/24 92.1 kg (203 lb)   04/18/24 106 kg (233 lb)   04/02/24 103 kg (226 lb 9.6 oz)   03/13/24 103 kg (226 lb)   02/20/24 105 kg (230 lb 12.8 oz)   02/16/24 104 kg (229 lb)       Medication:  Current Outpatient Medications   Medication Instructions    albuterol 90 mcg/actuation inhaler 2 puffs, inhalation, Every 4 hours PRN    aspirin 81 mg chewable tablet 1 tablet, Daily    atorvastatin (LIPITOR) 40 mg, oral, Daily    baclofen (Lioresal) 10 mg tablet 1 tablet, Every 8 hours PRN    clindamycin (CLEOCIN) 300 mg, oral, 3 times daily    clopidogrel " (PLAVIX) 75 mg, oral, Daily    furosemide (Lasix) 20 mg tablet TAKE 1 TABLET BY MOUTH ON MONDAY, WEDNESDAY, FRIDAY    ibuprofen 800 mg tablet TAKE ONE-HALF TO ONE TABLET BY MOUTH EVERY 8 HOURS AS NEEDED. TAKE WITH FOOD AND WATER    Lantus Solostar U-100 Insulin 25 Units, subcutaneous, Nightly, Take as directed per insulin instructions.    levothyroxine (SYNTHROID, LEVOXYL) 137 mcg, oral, Daily    naloxone (NARCAN) 4 mg, As needed    OneTouch Delica Plus Lancet 33 gauge misc Daily    OneTouch Verio test strips strip Daily    oxyCODONE (ROXICODONE) 15 mg, Every 6 hours PRN    semaglutide 1 mg, subcutaneous, Weekly     Physical Exam  Skin: Abscess present in the axillary region, likely infected hair follicle.  Gen:  Not in any acute distress   HE ENT: No pallor, No carotid bruit,  No thyromegaly   Lungs:  Clear to auscultation without rales, rhonchi, or rub.  Heart:  RRR, S1, S2, No murmur   Abd: No epigastric tenderness, No CVA tenderness   Extremities: No edema, calf swelling or tenderness.          Review of Systems:  Respiratory:  Denies stridor. No blood in sputum   Cardiovascular:  Denies chest pain, no sudden excessive sweating   Gastrointestinal:  Denies sour burping, no blood in stool    Genitourinary:  Denies blood in urine    Skin:  No new spot changing color or shape or border    Neurological: Denies speech difficulty, no memory disturbance        Recent Labs:   Lab Results   Component Value Date    WBC 5.2 02/11/2024    HGB 11.5 (L) 02/11/2024    HCT 38.1 02/11/2024     02/11/2024    CHOL 116 11/30/2023    TRIG 85 11/30/2023    HDL 58.6 11/30/2023    ALT 38 02/08/2024    AST 43 (H) 02/08/2024     02/13/2024    K 4.2 02/13/2024     02/13/2024    CREATININE 0.68 02/13/2024    BUN 11 02/13/2024    CO2 28 02/13/2024    TSH 19.24 (H) 11/30/2023    INR 1.1 02/07/2024    HGBA1C 10.6 (A) 05/22/2025     Lab Results   Component Value Date    GLUCOSE 182 (H) 02/13/2024    CALCIUM 8.7 02/13/2024      02/13/2024    K 4.2 02/13/2024    CO2 28 02/13/2024     02/13/2024    BUN 11 02/13/2024    CREATININE 0.68 02/13/2024      Lab Results   Component Value Date    LDLCALC 40 11/30/2023     Lab Results   Component Value Date    HGBA1C 10.6 (A) 05/22/2025    HGBA1C 9.8 (A) 02/20/2025    HGBA1C 11.7 (A) 11/19/2024     Lab Results   Component Value Date    LDLCALC 40 11/30/2023    CREATININE 0.68 02/13/2024       Diagnosis and Orders:       Armpit pain, left  -     clindamycin (Cleocin) 300 mg capsule  Vitamin D deficiency  Malignant neoplasm of upper lobe, right bronchus or lung  Atherosclerosis of native arteries of extremities with rest pain, bilateral legs  Type 2 diabetes mellitus with hyperosmolarity without coma, with long-term current use of insulin (Multi)  -     POCT glycosylated hemoglobin (Hb A1C) manually resulted : 10.6 %  -     semaglutide 0.25 mg or 0.5 mg (2 mg/3 mL) pen injector; Inject 1 mg under the skin 1 (one) time per week.  -     insulin glargine (Lantus Solostar U-100 Insulin) 100 unit/mL (3 mL) pen; Inject 25 Units under the skin once daily at bedtime. Take as directed per insulin instructions.         Assessment & Plan  1. Diabetes mellitus.  - Her A1c levels have increased from 9.8 in 02/2025 to 10.6 currently.  - She has lost weight, dropping from 233 pounds to 196 pounds.  - The dosage of semaglutide will be increased to 1 mg once weekly.  - Additionally, Lantus insulin will be introduced at a dosage of 25 units to be administered nightly. She is advised to consult with her pharmacist regarding the correct administration of semaglutide at the increased dosage.    2. Axillary abscess.  - The abscess appears to be an infection of a hair follicle, likely due to a blocked pore.  - It is not indicative of cancer or a lymph gland issue.  - The primary treatment will involve moist heat compression using a washcloth soaked in hot water with a small amount of Epsom salt for 15 to 20  minutes, three times daily.  - A course of clindamycin will also be initiated. If the abscess does not improve within 7 days, a follow-up appointment will be necessary for further evaluation and potential lancing.              This medical note was created with the assistance of artificial intelligence (AI) for documentation purposes. The content has been reviewed and confirmed by the healthcare provider for accuracy and completeness. Patient consented to the use of audio recording and use of AI during their visit.

## 2025-06-03 ENCOUNTER — TELEPHONE (OUTPATIENT)
Dept: PRIMARY CARE | Facility: CLINIC | Age: 65
End: 2025-06-03
Payer: MEDICARE

## 2025-06-03 DIAGNOSIS — Z79.4 TYPE 2 DIABETES MELLITUS WITH HYPEROSMOLARITY WITHOUT COMA, WITH LONG-TERM CURRENT USE OF INSULIN (MULTI): ICD-10-CM

## 2025-06-03 DIAGNOSIS — E11.00 TYPE 2 DIABETES MELLITUS WITH HYPEROSMOLARITY WITHOUT COMA, WITH LONG-TERM CURRENT USE OF INSULIN (MULTI): ICD-10-CM

## 2025-06-03 NOTE — TELEPHONE ENCOUNTER
Rec'd call from patient stating [facility] will not fill Ozempic. Pharmacy requesting RX refill to state in sig Patient dose 1mg. Fill as ordered or order 1mg dose/ pen of Ozempic

## 2025-06-23 DIAGNOSIS — Z79.4 TYPE 2 DIABETES MELLITUS WITH HYPEROSMOLAR COMA, WITH LONG-TERM CURRENT USE OF INSULIN: Primary | ICD-10-CM

## 2025-06-23 DIAGNOSIS — E11.01 TYPE 2 DIABETES MELLITUS WITH HYPEROSMOLAR COMA, WITH LONG-TERM CURRENT USE OF INSULIN: Primary | ICD-10-CM

## 2025-06-24 ENCOUNTER — APPOINTMENT (OUTPATIENT)
Dept: CARDIOLOGY | Facility: CLINIC | Age: 65
End: 2025-06-24
Payer: MEDICARE

## 2025-06-24 VITALS
WEIGHT: 188.3 LBS | BODY MASS INDEX: 28.63 KG/M2 | DIASTOLIC BLOOD PRESSURE: 58 MMHG | SYSTOLIC BLOOD PRESSURE: 96 MMHG | HEART RATE: 86 BPM

## 2025-06-24 DIAGNOSIS — I25.10 CORONARY ARTERY DISEASE INVOLVING NATIVE CORONARY ARTERY OF NATIVE HEART WITHOUT ANGINA PECTORIS: ICD-10-CM

## 2025-06-24 DIAGNOSIS — I70.223 ATHEROSCLEROSIS OF NATIVE ARTERIES OF EXTREMITIES WITH REST PAIN, BILATERAL LEGS: ICD-10-CM

## 2025-06-24 DIAGNOSIS — I15.2 HYPERTENSION ASSOCIATED WITH TYPE 2 DIABETES MELLITUS: ICD-10-CM

## 2025-06-24 DIAGNOSIS — E11.69 MIXED DIABETIC HYPERLIPIDEMIA ASSOCIATED WITH TYPE 2 DIABETES MELLITUS: ICD-10-CM

## 2025-06-24 DIAGNOSIS — E78.2 MIXED DIABETIC HYPERLIPIDEMIA ASSOCIATED WITH TYPE 2 DIABETES MELLITUS: ICD-10-CM

## 2025-06-24 DIAGNOSIS — I71.21 ANEURYSM OF ASCENDING AORTA WITHOUT RUPTURE: ICD-10-CM

## 2025-06-24 DIAGNOSIS — E11.59 HYPERTENSION ASSOCIATED WITH TYPE 2 DIABETES MELLITUS: ICD-10-CM

## 2025-06-24 DIAGNOSIS — J44.1 COPD EXACERBATION (MULTI): ICD-10-CM

## 2025-06-24 DIAGNOSIS — F17.200 CURRENT SMOKER: ICD-10-CM

## 2025-06-24 PROCEDURE — 4004F PT TOBACCO SCREEN RCVD TLK: CPT | Performed by: INTERNAL MEDICINE

## 2025-06-24 PROCEDURE — 3078F DIAST BP <80 MM HG: CPT | Performed by: INTERNAL MEDICINE

## 2025-06-24 PROCEDURE — 99214 OFFICE O/P EST MOD 30 MIN: CPT | Performed by: INTERNAL MEDICINE

## 2025-06-24 PROCEDURE — 1159F MED LIST DOCD IN RCRD: CPT | Performed by: INTERNAL MEDICINE

## 2025-06-24 PROCEDURE — 3046F HEMOGLOBIN A1C LEVEL >9.0%: CPT | Performed by: INTERNAL MEDICINE

## 2025-06-24 PROCEDURE — 3074F SYST BP LT 130 MM HG: CPT | Performed by: INTERNAL MEDICINE

## 2025-06-24 NOTE — PROGRESS NOTES
CARDIOLOGY OFFICE VISIT      CHIEF COMPLAINT  No chief complaint on file.       HISTORY OF PRESENT ILLNESS  Daphney Chen is a 65 y.o. year old female patient with history of peripheral vascular disease and bifemoral bypass secondary to critical limb ischemia, carotid stenosis and subclavian artery stenosis, hypertension and CAD status post stenting to the RCA in 2023.  She had some lower extremity swelling which appears to have improved with the recent adjustment in her diuretics.  Her main complaint today is that of increased shortness of breath.  Unfortunately she continues to smoke.    ASSESSMENT AND PLAN  1.  CAD with a history of multivessel angioplasty and status post RCA stent as noted above on recent cardiac catheterization which showed occluded left circumflex filling with collaterals.  Will continue with lifelong aspirin.  2.  Peripheral vascular disease status post right axillary to bifemoral bypass.  Continue with current antiplatelet therapy.  3.  Increased shortness of breath: I think this is probably secondary to possible COPD with a history of smoking.  We will refer to pulmonology for further evaluation.  4.  Lower extremity swelling: This is much improved on current diuretics with Lasix 20 mg 3 days a week which we will continue.    Problem List Items Addressed This Visit       Current smoker    Ascending aortic aneurysm    CAD (coronary artery disease)    Hypertension associated with type 2 diabetes mellitus    Mixed diabetic hyperlipidemia associated with type 2 diabetes mellitus    Atherosclerosis of native arteries of extremities with rest pain, bilateral legs    BMI 28.0-28.9,adult         Past Medical History  Medical History[1]    Social History  Social History[2]    Family History   Family History[3]     Allergies:  RX Allergies[4]     Outpatient Medications:  Current Outpatient Medications   Medication Instructions    albuterol 90 mcg/actuation inhaler 2 puffs, inhalation, Every 4  hours PRN    aspirin 81 mg chewable tablet 1 tablet, Daily    atorvastatin (LIPITOR) 40 mg, oral, Daily    baclofen (Lioresal) 10 mg tablet 1 tablet, Every 8 hours PRN    clopidogrel (PLAVIX) 75 mg, oral, Daily    furosemide (Lasix) 20 mg tablet TAKE 1 TABLET BY MOUTH ON MONDAY, WEDNESDAY, FRIDAY    ibuprofen 800 mg tablet TAKE ONE-HALF TO ONE TABLET BY MOUTH EVERY 8 HOURS AS NEEDED. TAKE WITH FOOD AND WATER    Lantus Solostar U-100 Insulin 25 Units, subcutaneous, Nightly, Take as directed per insulin instructions.    levothyroxine (SYNTHROID, LEVOXYL) 137 mcg, oral, Daily    naloxone (NARCAN) 4 mg, As needed    OneTouch Delica Plus Lancet 33 gauge misc Daily    OneTouch Verio test strips strip Daily    oxyCODONE (ROXICODONE) 15 mg, Every 6 hours PRN    semaglutide 1 mg, subcutaneous, Weekly        Recent Lab Results:  I have personally reviewed the below noted labs;    CBC:   Lab Results   Component Value Date    WBC 5.2 02/11/2024    RBC 4.16 02/11/2024    HGB 11.5 (L) 02/11/2024    HCT 38.1 02/11/2024     02/11/2024        CMP:    Lab Results   Component Value Date     02/13/2024    K 4.2 02/13/2024     02/13/2024    CO2 28 02/13/2024    BUN 11 02/13/2024    CREATININE 0.68 02/13/2024    GLUCOSE 182 (H) 02/13/2024    CALCIUM 8.7 02/13/2024       Magnesium:    Lab Results   Component Value Date    MG 1.67 02/07/2024       Lipid Profile:    Lab Results   Component Value Date    TRIG 85 11/30/2023    HDL 58.6 11/30/2023    LDLCALC 40 11/30/2023       TSH:    Lab Results   Component Value Date    TSH 19.24 (H) 11/30/2023       BNP:   Lab Results   Component Value Date    BNP 43 02/07/2024        PT/INR:    Lab Results   Component Value Date    PROTIME 11.9 02/07/2024    INR 1.1 02/07/2024       HgBA1c:    Lab Results   Component Value Date    HGBA1C 10.6 (A) 05/22/2025       BMP:  Lab Results   Component Value Date     02/13/2024     (L) 02/12/2024     02/09/2024    K 4.2  02/13/2024    K 4.3 02/12/2024    K 3.8 02/09/2024     02/13/2024     02/12/2024     02/09/2024    CO2 28 02/13/2024    CO2 27 02/12/2024    CO2 29 02/09/2024    BUN 11 02/13/2024    BUN 11 02/12/2024    BUN 18 02/09/2024    CREATININE 0.68 02/13/2024    CREATININE 0.67 02/12/2024    CREATININE 0.86 02/09/2024       CBC:  Lab Results   Component Value Date    WBC 5.2 02/11/2024    WBC 4.7 02/08/2024    WBC 6.0 02/07/2024    RBC 4.16 02/11/2024    RBC 4.40 02/08/2024    RBC 4.34 02/07/2024    HGB 11.5 (L) 02/11/2024    HGB 12.4 02/08/2024    HGB 12.3 02/07/2024    HCT 38.1 02/11/2024    HCT 40.3 02/08/2024    HCT 39.1 02/07/2024    MCV 92 02/11/2024    MCV 92 02/08/2024    MCV 90 02/07/2024    MCH 27.6 02/11/2024    MCH 28.2 02/08/2024    MCH 28.3 02/07/2024    MCHC 30.2 (L) 02/11/2024    MCHC 30.8 (L) 02/08/2024    MCHC 31.5 (L) 02/07/2024    RDW 15.6 (H) 02/11/2024    RDW 15.4 (H) 02/08/2024    RDW 15.4 (H) 02/07/2024     02/11/2024     02/08/2024     02/07/2024       Cardiac Enzymes:    Lab Results   Component Value Date    TROPHS 17 (H) 02/07/2024    TROPHS 17 (H) 02/07/2024    TROPHS 245 (H) 06/25/2023       Hepatic Function Panel:    Lab Results   Component Value Date    ALKPHOS 130 02/08/2024    ALT 38 02/08/2024    AST 43 (H) 02/08/2024    PROT 6.6 02/08/2024    BILITOT 0.6 02/08/2024    BILIDIR 0.9 (H) 06/15/2023         REVIEW OF SYSTEMS  Review of Systems   Constitutional: Negative.   Cardiovascular: Negative.    Respiratory: Negative.     Neurological: Negative.    All other systems reviewed and are negative.      VITALS  There were no vitals filed for this visit.  Wt Readings from Last 4 Encounters:   05/22/25 89.2 kg (196 lb 9.6 oz)   02/20/25 90 kg (198 lb 6.4 oz)   11/19/24 92.3 kg (203 lb 6.4 oz)   11/18/24 91.2 kg (201 lb)       PHYSICAL EXAM  Constitutional:       Appearance: Healthy appearance. Not in distress.   Eyes:      Conjunctiva/sclera: Conjunctivae  normal.      Pupils: Pupils are equal, round, and reactive to light.   Neck:      Vascular: No JVR. JVD normal.   Pulmonary:      Effort: Pulmonary effort is normal.      Breath sounds: Normal breath sounds. No wheezing. No rhonchi. No rales.   Chest:      Chest wall: Not tender to palpatation.   Cardiovascular:      PMI at left midclavicular line. Normal rate. Regular rhythm. Normal S1. Normal S2.       Murmurs:  2/6  systolic murmur  right and left USB      No gallop.  No click. No rub.   Pulses:     Intact distal pulses.   Edema:     Peripheral edema present.     Ankle: bilateral trace edema of the ankle.  Abdominal:      Tenderness: There is no abdominal tenderness.   Musculoskeletal: Normal range of motion.         General: No tenderness.      Cervical back: Normal range of motion. Skin:     General: Skin is warm and dry.   Neurological:      General: No focal deficit present.      Mental Status: Alert and oriented to person, place and time.             IGerard LPN   am scribing for, and in the presence of Dr. Ronald Wellington MD  .    I, Dr. Ronald Wellington MD  , personally performed the services described in the documentation as scribed by Gerard Garza LPN   in my presence, and confirm it is both accurate and complete.      Dr. Ronald Wellington MD  Thank you for allowing me to participate in the care of this patient. Please do not hesitate to contact me with any further questions or concerns.         [1] No past medical history on file.  [2]   Social History  Tobacco Use    Smoking status: Every Day     Current packs/day: 0.25     Average packs/day: 1 pack/day for 30.8 years (30.2 ttl pk-yrs)     Types: Cigarettes     Start date: 03/1990     Last attempt to quit: 03/2020    Smokeless tobacco: Never   Vaping Use    Vaping status: Never Used   Substance Use Topics    Alcohol use: Not Currently    Drug use: Never   [3]   Family History  Problem Relation Name Age of Onset    Diabetes Mother       Hypertension Mother      Heart attack Mother      Heart attack Daughter      Other (blood cancer) Daughter     [4]   Allergies  Allergen Reactions    Morphine Itching and Rash     Feels like bugs are crawling on her

## 2025-06-24 NOTE — PATIENT INSTRUCTIONS
Refer to Pulmonary Dr Scherer  6 month visit  Continue same medications and treatments.   Patient educated on proper medication use.   Patient educated on risk factor modification.   Please bring any lab results from other providers / physicians to your next appointment.     Please bring all medicines, vitamins, and herbal supplements with you when you come to the office.     Prescriptions will not be filled unless you are compliant with your follow up appointments or have a follow up appointment scheduled as per instruction of your physician. Refills should be requested at the time of your visit.

## 2025-07-02 DIAGNOSIS — E11.00 TYPE 2 DIABETES MELLITUS WITH HYPEROSMOLARITY WITHOUT COMA, WITH LONG-TERM CURRENT USE OF INSULIN (MULTI): ICD-10-CM

## 2025-07-02 DIAGNOSIS — Z79.4 TYPE 2 DIABETES MELLITUS WITH HYPEROSMOLARITY WITHOUT COMA, WITH LONG-TERM CURRENT USE OF INSULIN (MULTI): ICD-10-CM

## 2025-07-02 RX ORDER — INSULIN GLARGINE 100 [IU]/ML
25 INJECTION, SOLUTION SUBCUTANEOUS NIGHTLY
Qty: 3 ML | Refills: 3 | Status: SHIPPED | OUTPATIENT
Start: 2025-07-02 | End: 2026-07-02

## 2025-07-11 ENCOUNTER — TELEPHONE (OUTPATIENT)
Dept: PRIMARY CARE | Facility: CLINIC | Age: 65
End: 2025-07-11
Payer: MEDICARE

## 2025-07-11 DIAGNOSIS — E11.01 TYPE 2 DIABETES MELLITUS WITH HYPEROSMOLAR COMA, WITH LONG-TERM CURRENT USE OF INSULIN: Primary | ICD-10-CM

## 2025-07-11 DIAGNOSIS — Z79.4 TYPE 2 DIABETES MELLITUS WITH HYPEROSMOLAR COMA, WITH LONG-TERM CURRENT USE OF INSULIN: Primary | ICD-10-CM

## 2025-07-11 RX ORDER — PEN NEEDLE, DIABETIC 30 GX3/16"
NEEDLE, DISPOSABLE MISCELLANEOUS
Qty: 100 EACH | Refills: 3 | Status: SHIPPED | OUTPATIENT
Start: 2025-07-11

## 2025-07-11 NOTE — TELEPHONE ENCOUNTER
Last OV: 5-  Pending OV; 8-    Call from [facility] [phone] (Ollie) on a recorded line. Requesting Pt Medical chart & Prescriptions Ref #[id number] Fax: [phone].    Call to [facility] to provide Medical Records number [phone]. Provided information to staff.  
none

## 2025-07-11 NOTE — TELEPHONE ENCOUNTER
Last OV: 5-  Pending OV: 8-    Pt called to report out of Pen needles need a prescription.    Call to Pt to inform office received message. Pt aware.    Order pended.

## 2025-08-01 DIAGNOSIS — Z79.4 TYPE 2 DIABETES MELLITUS WITH HYPEROSMOLARITY WITHOUT COMA, WITH LONG-TERM CURRENT USE OF INSULIN (MULTI): ICD-10-CM

## 2025-08-01 DIAGNOSIS — E11.00 TYPE 2 DIABETES MELLITUS WITH HYPEROSMOLARITY WITHOUT COMA, WITH LONG-TERM CURRENT USE OF INSULIN (MULTI): ICD-10-CM

## 2025-08-01 NOTE — TELEPHONE ENCOUNTER
Last OV: 5/22/2025     Future Appointments       Date / Time Provider Department Dept Phone    8/25/2025 2:30 PM Dagoberto Baxter MD [facility] [phone]    9/5/2025 1:30 PM Jose Lowe MD [facility] Pulmonology     1/6/2026 11:15 AM Ronald Wellington MD [facility] [phone]

## 2025-08-02 RX ORDER — INSULIN GLARGINE 100 [IU]/ML
25 INJECTION, SOLUTION SUBCUTANEOUS NIGHTLY
Qty: 23 ML | Refills: 3 | Status: SHIPPED | OUTPATIENT
Start: 2025-08-02

## 2025-08-25 ENCOUNTER — APPOINTMENT (OUTPATIENT)
Dept: PRIMARY CARE | Facility: CLINIC | Age: 65
End: 2025-08-25
Payer: MEDICARE

## 2025-08-25 VITALS
HEART RATE: 83 BPM | OXYGEN SATURATION: 94 % | WEIGHT: 190.4 LBS | DIASTOLIC BLOOD PRESSURE: 62 MMHG | HEIGHT: 68 IN | BODY MASS INDEX: 28.85 KG/M2 | TEMPERATURE: 98 F | SYSTOLIC BLOOD PRESSURE: 94 MMHG

## 2025-08-25 DIAGNOSIS — M81.0 AGE-RELATED OSTEOPOROSIS WITHOUT CURRENT PATHOLOGICAL FRACTURE: ICD-10-CM

## 2025-08-25 DIAGNOSIS — Z79.4 TYPE 2 DIABETES MELLITUS WITH HYPEROSMOLAR COMA, WITH LONG-TERM CURRENT USE OF INSULIN: ICD-10-CM

## 2025-08-25 DIAGNOSIS — E11.01 TYPE 2 DIABETES MELLITUS WITH HYPEROSMOLAR COMA, WITH LONG-TERM CURRENT USE OF INSULIN: ICD-10-CM

## 2025-08-25 DIAGNOSIS — Z74.09 IMPAIRED FUNCTIONAL MOBILITY, BALANCE, GAIT, AND ENDURANCE: ICD-10-CM

## 2025-08-25 DIAGNOSIS — E58 INADEQUATE INTAKE OF CALCIUM AND VITAMIN D: ICD-10-CM

## 2025-08-25 DIAGNOSIS — Z12.31 SCREENING MAMMOGRAM FOR BREAST CANCER: ICD-10-CM

## 2025-08-25 DIAGNOSIS — Z12.11 SCREENING FOR COLON CANCER: ICD-10-CM

## 2025-08-25 DIAGNOSIS — Z13.29 SCREENING FOR THYROID DISORDER: ICD-10-CM

## 2025-08-25 DIAGNOSIS — E11.59 HYPERTENSION ASSOCIATED WITH TYPE 2 DIABETES MELLITUS: ICD-10-CM

## 2025-08-25 DIAGNOSIS — R73.9 HYPERGLYCEMIA: ICD-10-CM

## 2025-08-25 DIAGNOSIS — Z13.89 SCREENING FOR BLOOD OR PROTEIN IN URINE: ICD-10-CM

## 2025-08-25 DIAGNOSIS — I15.2 HYPERTENSION ASSOCIATED WITH TYPE 2 DIABETES MELLITUS: ICD-10-CM

## 2025-08-25 DIAGNOSIS — Z13.220 SCREENING FOR LIPID DISORDERS: ICD-10-CM

## 2025-08-25 DIAGNOSIS — E03.9 ACQUIRED HYPOTHYROIDISM: Primary | ICD-10-CM

## 2025-08-25 DIAGNOSIS — F17.200 CURRENT EVERY DAY SMOKER: ICD-10-CM

## 2025-08-25 DIAGNOSIS — E55.9 INADEQUATE INTAKE OF CALCIUM AND VITAMIN D: ICD-10-CM

## 2025-08-25 PROBLEM — I25.10 CAD (CORONARY ARTERY DISEASE): Status: RESOLVED | Noted: 2023-08-22 | Resolved: 2025-08-25

## 2025-08-25 PROBLEM — E66.01 OBESITY, MORBID (MULTI): Status: RESOLVED | Noted: 2023-08-22 | Resolved: 2025-08-25

## 2025-08-25 LAB — POC HEMOGLOBIN A1C: 6.3 % (ref 4.2–6.5)

## 2025-08-25 PROCEDURE — 1160F RVW MEDS BY RX/DR IN RCRD: CPT | Performed by: INTERNAL MEDICINE

## 2025-08-25 PROCEDURE — 3044F HG A1C LEVEL LT 7.0%: CPT | Performed by: INTERNAL MEDICINE

## 2025-08-25 PROCEDURE — 3078F DIAST BP <80 MM HG: CPT | Performed by: INTERNAL MEDICINE

## 2025-08-25 PROCEDURE — 99214 OFFICE O/P EST MOD 30 MIN: CPT | Performed by: INTERNAL MEDICINE

## 2025-08-25 PROCEDURE — 1159F MED LIST DOCD IN RCRD: CPT | Performed by: INTERNAL MEDICINE

## 2025-08-25 PROCEDURE — 3008F BODY MASS INDEX DOCD: CPT | Performed by: INTERNAL MEDICINE

## 2025-08-25 PROCEDURE — G2211 COMPLEX E/M VISIT ADD ON: HCPCS | Performed by: INTERNAL MEDICINE

## 2025-08-25 PROCEDURE — 3074F SYST BP LT 130 MM HG: CPT | Performed by: INTERNAL MEDICINE

## 2025-08-25 PROCEDURE — 83036 HEMOGLOBIN GLYCOSYLATED A1C: CPT | Performed by: INTERNAL MEDICINE

## 2025-08-25 RX ORDER — METOLAZONE 2.5 MG/1
2.5 TABLET ORAL
COMMUNITY
Start: 2024-12-06

## 2025-09-03 DIAGNOSIS — E11.69 MIXED DIABETIC HYPERLIPIDEMIA ASSOCIATED WITH TYPE 2 DIABETES MELLITUS: ICD-10-CM

## 2025-09-03 DIAGNOSIS — E78.2 MIXED DIABETIC HYPERLIPIDEMIA ASSOCIATED WITH TYPE 2 DIABETES MELLITUS: ICD-10-CM

## 2025-09-03 RX ORDER — ATORVASTATIN CALCIUM 40 MG/1
40 TABLET, FILM COATED ORAL DAILY
Qty: 90 TABLET | Refills: 3 | Status: SHIPPED | OUTPATIENT
Start: 2025-09-03

## 2025-09-05 ENCOUNTER — APPOINTMENT (OUTPATIENT)
Dept: PULMONOLOGY | Facility: CLINIC | Age: 65
End: 2025-09-05
Payer: MEDICARE

## 2025-10-21 ENCOUNTER — APPOINTMENT (OUTPATIENT)
Dept: GASTROENTEROLOGY | Facility: EXTERNAL LOCATION | Age: 65
End: 2025-10-21
Payer: MEDICARE

## 2025-11-04 ENCOUNTER — APPOINTMENT (OUTPATIENT)
Dept: PULMONOLOGY | Facility: CLINIC | Age: 65
End: 2025-11-04
Payer: MEDICARE

## 2026-01-06 ENCOUNTER — APPOINTMENT (OUTPATIENT)
Dept: CARDIOLOGY | Facility: CLINIC | Age: 66
End: 2026-01-06
Payer: MEDICARE

## 2026-02-25 ENCOUNTER — APPOINTMENT (OUTPATIENT)
Dept: PRIMARY CARE | Facility: CLINIC | Age: 66
End: 2026-02-25
Payer: MEDICARE